# Patient Record
Sex: FEMALE | Race: WHITE | NOT HISPANIC OR LATINO | Employment: OTHER | URBAN - METROPOLITAN AREA
[De-identification: names, ages, dates, MRNs, and addresses within clinical notes are randomized per-mention and may not be internally consistent; named-entity substitution may affect disease eponyms.]

---

## 2017-04-01 ENCOUNTER — APPOINTMENT (OUTPATIENT)
Dept: GENERAL RADIOLOGY | Facility: HOSPITAL | Age: 62
End: 2017-04-01

## 2017-04-01 ENCOUNTER — HOSPITAL ENCOUNTER (EMERGENCY)
Facility: HOSPITAL | Age: 62
Discharge: HOME | End: 2017-04-01
Attending: EMERGENCY MEDICINE | Admitting: EMERGENCY MEDICINE

## 2017-04-01 VITALS
HEART RATE: 62 BPM | HEIGHT: 64 IN | DIASTOLIC BLOOD PRESSURE: 67 MMHG | OXYGEN SATURATION: 95 % | SYSTOLIC BLOOD PRESSURE: 127 MMHG | BODY MASS INDEX: 27.31 KG/M2 | WEIGHT: 160 LBS | RESPIRATION RATE: 16 BRPM | TEMPERATURE: 97.8 F

## 2017-04-01 DIAGNOSIS — R07.9 CHEST PAIN, UNSPECIFIED: Primary | ICD-10-CM

## 2017-04-01 LAB
ALBUMIN SERPL-MCNC: 4.2 G/DL (ref 3.2–4.8)
ALBUMIN/GLOB SERPL: 1.2 G/DL (ref 1.5–2.5)
ALP SERPL-CCNC: 129 U/L (ref 25–100)
ALT SERPL W P-5'-P-CCNC: 15 U/L (ref 7–40)
ANION GAP SERPL CALCULATED.3IONS-SCNC: 5 MMOL/L (ref 3–11)
AST SERPL-CCNC: 19 U/L (ref 0–33)
BASOPHILS # BLD AUTO: 0.02 10*3/MM3 (ref 0–0.2)
BASOPHILS NFR BLD AUTO: 0.3 % (ref 0–1)
BILIRUB SERPL-MCNC: 0.3 MG/DL (ref 0.3–1.2)
BNP SERPL-MCNC: 18 PG/ML (ref 0–100)
BUN BLD-MCNC: 19 MG/DL (ref 9–23)
BUN/CREAT SERPL: 21.1 (ref 7–25)
CALCIUM SPEC-SCNC: 9.8 MG/DL (ref 8.7–10.4)
CHLORIDE SERPL-SCNC: 97 MMOL/L (ref 99–109)
CO2 SERPL-SCNC: 33 MMOL/L (ref 20–31)
CREAT BLD-MCNC: 0.9 MG/DL (ref 0.6–1.3)
DEPRECATED RDW RBC AUTO: 45.6 FL (ref 37–54)
EOSINOPHIL # BLD AUTO: 0.11 10*3/MM3 (ref 0.1–0.3)
EOSINOPHIL NFR BLD AUTO: 1.5 % (ref 0–3)
ERYTHROCYTE [DISTWIDTH] IN BLOOD BY AUTOMATED COUNT: 13.7 % (ref 11.3–14.5)
GFR SERPL CREATININE-BSD FRML MDRD: 64 ML/MIN/1.73
GLOBULIN UR ELPH-MCNC: 3.6 GM/DL
GLUCOSE BLD-MCNC: 217 MG/DL (ref 70–100)
HCT VFR BLD AUTO: 38.9 % (ref 34.5–44)
HGB BLD-MCNC: 12 G/DL (ref 11.5–15.5)
HOLD SPECIMEN: NORMAL
HOLD SPECIMEN: NORMAL
IMM GRANULOCYTES # BLD: 0.01 10*3/MM3 (ref 0–0.03)
IMM GRANULOCYTES NFR BLD: 0.1 % (ref 0–0.6)
INR PPP: 2.3
LIPASE SERPL-CCNC: 53 U/L (ref 6–51)
LYMPHOCYTES # BLD AUTO: 1.83 10*3/MM3 (ref 0.6–4.8)
LYMPHOCYTES NFR BLD AUTO: 25.4 % (ref 24–44)
MCH RBC QN AUTO: 27.8 PG (ref 27–31)
MCHC RBC AUTO-ENTMCNC: 30.8 G/DL (ref 32–36)
MCV RBC AUTO: 90 FL (ref 80–99)
MONOCYTES # BLD AUTO: 0.36 10*3/MM3 (ref 0–1)
MONOCYTES NFR BLD AUTO: 5 % (ref 0–12)
NEUTROPHILS # BLD AUTO: 4.87 10*3/MM3 (ref 1.5–8.3)
NEUTROPHILS NFR BLD AUTO: 67.7 % (ref 41–71)
PLATELET # BLD AUTO: 321 10*3/MM3 (ref 150–450)
PMV BLD AUTO: 10.1 FL (ref 6–12)
POTASSIUM BLD-SCNC: 3.5 MMOL/L (ref 3.5–5.5)
PROT SERPL-MCNC: 7.8 G/DL (ref 5.7–8.2)
PROTHROMBIN TIME: 25.7 SECONDS (ref 9.6–11.5)
RBC # BLD AUTO: 4.32 10*6/MM3 (ref 3.89–5.14)
SODIUM BLD-SCNC: 135 MMOL/L (ref 132–146)
TROPONIN I SERPL-MCNC: 0 NG/ML (ref 0–0.07)
TROPONIN I SERPL-MCNC: 0 NG/ML (ref 0–0.07)
WBC NRBC COR # BLD: 7.2 10*3/MM3 (ref 3.5–10.8)
WHOLE BLOOD HOLD SPECIMEN: NORMAL
WHOLE BLOOD HOLD SPECIMEN: NORMAL

## 2017-04-01 PROCEDURE — 71010 HC CHEST PA OR AP: CPT

## 2017-04-01 PROCEDURE — 85610 PROTHROMBIN TIME: CPT | Performed by: EMERGENCY MEDICINE

## 2017-04-01 PROCEDURE — 83880 ASSAY OF NATRIURETIC PEPTIDE: CPT | Performed by: EMERGENCY MEDICINE

## 2017-04-01 PROCEDURE — 99243 OFF/OP CNSLTJ NEW/EST LOW 30: CPT | Performed by: INTERNAL MEDICINE

## 2017-04-01 PROCEDURE — 84484 ASSAY OF TROPONIN QUANT: CPT

## 2017-04-01 PROCEDURE — 80053 COMPREHEN METABOLIC PANEL: CPT | Performed by: EMERGENCY MEDICINE

## 2017-04-01 PROCEDURE — 85025 COMPLETE CBC W/AUTO DIFF WBC: CPT | Performed by: EMERGENCY MEDICINE

## 2017-04-01 PROCEDURE — 83690 ASSAY OF LIPASE: CPT | Performed by: EMERGENCY MEDICINE

## 2017-04-01 PROCEDURE — 99284 EMERGENCY DEPT VISIT MOD MDM: CPT

## 2017-04-01 PROCEDURE — 93005 ELECTROCARDIOGRAM TRACING: CPT | Performed by: EMERGENCY MEDICINE

## 2017-04-01 RX ORDER — SODIUM CHLORIDE 0.9 % (FLUSH) 0.9 %
10 SYRINGE (ML) INJECTION AS NEEDED
Status: DISCONTINUED | OUTPATIENT
Start: 2017-04-01 | End: 2017-04-01 | Stop reason: HOSPADM

## 2017-04-01 RX ORDER — WARFARIN SODIUM 4 MG/1
4 TABLET ORAL EVERY OTHER DAY
COMMUNITY
End: 2018-06-25

## 2017-04-01 NOTE — CONSULTS
[facility] at [facility]  ER cardiology consultation note      Chief Complaint/Reason for service:    · Chest pain         The patient is a 61-year-old female with a history of coronary artery disease and previous bypass surgery who underwent acceptable cardiac catheterization in August 2016.  He is followed by Dr. Tam Calabrese at the [facility].  This morning, she experienced substernal chest discomfort with radiation to her jaw while changing her sheets.  She took a baby aspirin and nitroglycerin and initially this did not resolve.  She called EMS and when EMS arrived they gave an additional nitroglycerin and her symptoms resolved.  She has been free of chest pain since arrival to the ER.  Her EKG is normal and her troponins are 0 and 0.  She desires to go home.    Past medical, surgical, social and family history reviewed in the patient's electronic medical record.    Review of Systems:   All systems were reviewed and negative except for:  Cardiovascular: positive for  chest pressure / pain, at rest and chest pressure / pain, on exertion  Neurological: positive for  Pain and weakness       Vital Sign Min/Max for last 24 hours  Temp  Min: 98.3 °F (36.8 °C)  Max: 98.3 °F (36.8 °C)   BP  Min: 90/54  Max: 110/65   Pulse  Min: 57  Max: 61   Resp  Min: 20  Max: 20   SpO2  Min: 94 %  Max: 98 %   No Data Recorded    No intake or output data in the 24 hours ending 04/01/17 1504        Physical Exam   Constitutional: She is oriented to person, place, and time. She appears well-developed and well-nourished.   HENT:   Head: Normocephalic and atraumatic.   Eyes: No scleral icterus.   Neck: No JVD present.   Cardiovascular: Normal rate and regular rhythm.    No murmur heard.  Pulmonary/Chest: Effort normal and breath sounds normal.   Neurological: She is alert and oriented to person, place, and time.   Skin: Skin is warm and dry.   Psychiatric: Her behavior is normal.       Results Review:   I  reviewed the patient's recent labs in the electronic medical record.      Troponin 0 and 0    Lab Results   Component Value Date    GLUCOSE 217 (H) 04/01/2017    BUN 19 04/01/2017    CREATININE 0.90 04/01/2017    EGFRIFNONA 64 04/01/2017    EGFRIFAFRI 77 08/22/2016    BCR 21.1 04/01/2017    K 3.5 04/01/2017    CO2 33.0 (H) 04/01/2017    CALCIUM 9.8 04/01/2017    ALBUMIN 4.20 04/01/2017    LABIL2 1.2 (L) 04/01/2017    AST 19 04/01/2017    ALT 15 04/01/2017     Lab Results   Component Value Date    WBC 7.20 04/01/2017    HGB 12.0 04/01/2017    HCT 38.9 04/01/2017    MCV 90.0 04/01/2017     04/01/2017         EKG:  Sinus rhythm with 58 bpm.  No ischemic ST or T-wave changes.                Hospital Problem List     * (Principal)Coronary artery disease involving native coronary artery of native heart with angina pectoris    Overview Addendum 4/1/2017  3:08 PM by Bernard Palumbo MD     · Coronary artery bypass surgery by Dr. Darion Corbin (12/28/2011): Sequential LIMA to diagonal/LAD, SVG to left posterior lateral branch, SVG to nondominant RCA.  · Stress test reportedly normal per patient, February 2016  · [facility] admission for biomarker negative for recurrent chest pain  · Cardiac catheterization (8/22/16): Severe 2 vessel coronary disease involving the mid LAD and nondominant right coronary artery.  3/3 grafts patent (LIMA to diagonal/LAD, SVG to left posterior branch, SVG to nondominant RCA).  SVG to RCA is atretic due to small distal vessel.  Normal LVEF.  Medical therapy recommended.  · [facility] ER presentation with chest pain rule out, 4/1/17             The patient is chest pain-free and has negative cardiac biomarkers and EKG.  She underwent cardiac catheterization approximately 8 months ago which was acceptable.           · Discharge home from ER  · Follow-up with Tam Calabrese  · If chest pain symptoms recur, advised patient to return to ER    Bernard Palumbo,  MD  4/1/2017

## 2017-04-01 NOTE — ED PROVIDER NOTES
Subjective   HPI Comments: 61 y.o. Female presents to ED with c/o CP. She reports that while doing the bed this morning she developed chest pain and started feeling clammy. She states that the pain is in the center of her chest and goes a little bit to the left. She claims she took 2 nitroglycerin and 2 baby aspirin at home with no relief and EMS gave her 4-5 more baby aspirin en route with moderate relief. She denies any SoA, nausea or sweating. She has hx of MI, PE, and blood clots but claims it doesn't feel like her previous blood clots. She is currently taking Cumidine. No other acute complaints at this time.    Patient is a 61 y.o. female presenting with chest pain.   History provided by:  Patient  Chest Pain   Pain location:  L chest  Pain radiates to:  Does not radiate  Pain severity:  Moderate  Onset quality:  Sudden  Timing:  Constant  Progression:  Improving  Chronicity:  New  Relieved by:  Aspirin  Worsened by:  Nothing  Ineffective treatments:  Nitroglycerin  Associated symptoms: no abdominal pain, no back pain, no cough, no diaphoresis, no fever, no lower extremity edema, no nausea, no shortness of breath and no vomiting        Review of Systems   Constitutional: Negative for diaphoresis and fever.   HENT: Negative for nosebleeds, rhinorrhea and sneezing.    Respiratory: Negative for cough and shortness of breath.    Cardiovascular: Positive for chest pain.   Gastrointestinal: Negative for abdominal pain, nausea and vomiting.   Musculoskeletal: Negative for back pain.   All other systems reviewed and are negative.      Past Medical History:   Diagnosis Date   • ASHD (arteriosclerotic heart disease)    • Cervical disc disorder of mid-cervical region    • Chronic pain    • Depression    • Diabetes mellitus    • Hyperlipidemia    • Myocardial infarction    • Pulmonary embolism        Allergies   Allergen Reactions   • Penicillins Anaphylaxis   • Abilify [Aripiprazole]      Unknown reaction   • Lipitor  [Atorvastatin]      Causes hair to fall out   • Narcan [Naloxone Hcl]      Caused a heart attack   • Neosporin [Neomycin-Bacitracin Zn-Polymyx]      Blisters   • Flexeril [Cyclobenzaprine] Rash   • Vioxx [Rofecoxib] Rash       Past Surgical History:   Procedure Laterality Date   • CARDIAC CATHETERIZATION N/A 8/22/2016    Procedure: Left Heart Cath with +/- CBI;  Surgeon: Bernard Palumbo MD;  Location: [facility] CATH INVASIVE LOCATION;  Service:    • CORONARY ARTERY BYPASS GRAFT     • CORONARY ARTERY BYPASS GRAFT     • PAIN PUMP INSERTION/REVISION     • PAIN PUMP INSERTION/REVISION         History reviewed. No pertinent family history.    Social History     Social History   • Marital status:      Spouse name: N/A   • Number of children: N/A   • Years of education: N/A     Social History Main Topics   • Smoking status: Never Smoker   • Smokeless tobacco: None   • Alcohol use No   • Drug use: No   • Sexual activity: Defer     Other Topics Concern   • None     Social History Narrative   • None         Objective   Physical Exam   Constitutional: She is oriented to person, place, and time. She appears well-developed and well-nourished. No distress.   HENT:   Head: Normocephalic and atraumatic.   Mouth/Throat: Mucous membranes are normal.   Eyes: Conjunctivae are normal. Pupils are equal, round, and reactive to light.   Neck: Normal range of motion. Neck supple.   Cardiovascular: Normal rate, regular rhythm and normal heart sounds.  Exam reveals no gallop and no friction rub.    No murmur heard.  Pulmonary/Chest: Effort normal. No respiratory distress. She has no wheezes. She has no rales. She exhibits no tenderness.   Abdominal: Soft. Bowel sounds are normal. She exhibits no mass. There is no tenderness. There is no rebound and no guarding.   Musculoskeletal: Normal range of motion. She exhibits no edema, tenderness or deformity.   Lymphadenopathy:     She has no cervical adenopathy.   Neurological: She is  alert and oriented to person, place, and time.   Skin: Skin is warm and dry.   Psychiatric: She has a normal mood and affect. Her behavior is normal.   Nursing note and vitals reviewed.      Procedures         ED Course  ED Course           HEART Score  History: Highly suspicious (+2)  ECG: Normal (+0)  Age: 45 through 65 (+1)  Risk Factors: 3 or more risk factors OR history of atherosclerotic disease (+2)  Troponin: Normal limit or lower (+0)  Total: 5     EKG SB, some artifact.  CXR negative.  2 negative EKGs and 2 negative troponins.  Pt pain free on serial rechecks.  Reviewed records, heart cath about 6 months ago showed widely patent grafts, no actionable disease.  I consulted Dr Palumbo who evaluated the patient in the ED and recommended dc home for outpatient followup.        MDM  Number of Diagnoses or Management Options  Chest pain, unspecified:      Amount and/or Complexity of Data Reviewed  Clinical lab tests: reviewed and ordered  Tests in the radiology section of CPT®: ordered and reviewed  Decide to obtain previous medical records or to obtain history from someone other than the patient: yes  Discuss the patient with other providers: yes  Independent visualization of images, tracings, or specimens: yes        Final diagnoses:   Chest pain, unspecified       Documentation assistance provided by sarmad Crandall.  Information recorded by the sarmad was done at my direction and has been verified and validated by me.     Dana Crandall  04/01/17 1110       Dana Crandall  04/01/17 1157       Dana Crandall  04/01/17 1159       Dana Crandall  04/01/17 1204       Saad Dee MD  04/01/17 7508

## 2017-07-28 ENCOUNTER — APPOINTMENT (OUTPATIENT)
Dept: CT IMAGING | Facility: HOSPITAL | Age: 62
End: 2017-07-28

## 2017-07-28 ENCOUNTER — HOSPITAL ENCOUNTER (EMERGENCY)
Facility: HOSPITAL | Age: 62
Discharge: HOME | End: 2017-07-28
Attending: EMERGENCY MEDICINE | Admitting: EMERGENCY MEDICINE

## 2017-07-28 ENCOUNTER — APPOINTMENT (OUTPATIENT)
Dept: GENERAL RADIOLOGY | Facility: HOSPITAL | Age: 62
End: 2017-07-28

## 2017-07-28 VITALS
HEIGHT: 60 IN | TEMPERATURE: 98.7 F | BODY MASS INDEX: 31.41 KG/M2 | WEIGHT: 160 LBS | SYSTOLIC BLOOD PRESSURE: 135 MMHG | HEART RATE: 72 BPM | OXYGEN SATURATION: 99 % | RESPIRATION RATE: 16 BRPM | DIASTOLIC BLOOD PRESSURE: 75 MMHG

## 2017-07-28 DIAGNOSIS — M54.50 ACUTE EXACERBATION OF CHRONIC LOW BACK PAIN: Primary | ICD-10-CM

## 2017-07-28 DIAGNOSIS — G89.29 ACUTE EXACERBATION OF CHRONIC LOW BACK PAIN: Primary | ICD-10-CM

## 2017-07-28 LAB
ALBUMIN SERPL-MCNC: 3.9 G/DL (ref 3.2–4.8)
ALBUMIN/GLOB SERPL: 1.1 G/DL (ref 1.5–2.5)
ALP SERPL-CCNC: 101 U/L (ref 25–100)
ALT SERPL W P-5'-P-CCNC: 17 U/L (ref 7–40)
AMPHET+METHAMPHET UR QL: NEGATIVE
AMPHETAMINES UR QL: POSITIVE
ANION GAP SERPL CALCULATED.3IONS-SCNC: 8 MMOL/L (ref 3–11)
AST SERPL-CCNC: 18 U/L (ref 0–33)
BACTERIA UR QL AUTO: ABNORMAL /HPF
BARBITURATES UR QL SCN: NEGATIVE
BASOPHILS # BLD AUTO: 0.01 10*3/MM3 (ref 0–0.2)
BASOPHILS NFR BLD AUTO: 0.1 % (ref 0–1)
BENZODIAZ UR QL SCN: NEGATIVE
BILIRUB SERPL-MCNC: 0.5 MG/DL (ref 0.3–1.2)
BILIRUB UR QL STRIP: NEGATIVE
BUN BLD-MCNC: 16 MG/DL (ref 9–23)
BUN/CREAT SERPL: 17.8 (ref 7–25)
BUPRENORPHINE SERPL-MCNC: NEGATIVE NG/ML
CALCIUM SPEC-SCNC: 9.2 MG/DL (ref 8.7–10.4)
CANNABINOIDS SERPL QL: NEGATIVE
CHLORIDE SERPL-SCNC: 105 MMOL/L (ref 99–109)
CLARITY UR: CLEAR
CO2 SERPL-SCNC: 26 MMOL/L (ref 20–31)
COCAINE UR QL: NEGATIVE
COLOR UR: YELLOW
CREAT BLD-MCNC: 0.9 MG/DL (ref 0.6–1.3)
DEPRECATED RDW RBC AUTO: 42 FL (ref 37–54)
EOSINOPHIL # BLD AUTO: 0 10*3/MM3 (ref 0–0.3)
EOSINOPHIL NFR BLD AUTO: 0 % (ref 0–3)
ERYTHROCYTE [DISTWIDTH] IN BLOOD BY AUTOMATED COUNT: 13.9 % (ref 11.3–14.5)
GFR SERPL CREATININE-BSD FRML MDRD: 63 ML/MIN/1.73
GLOBULIN UR ELPH-MCNC: 3.7 GM/DL
GLUCOSE BLD-MCNC: 169 MG/DL (ref 70–100)
GLUCOSE UR STRIP-MCNC: NEGATIVE MG/DL
HCT VFR BLD AUTO: 35.3 % (ref 34.5–44)
HGB BLD-MCNC: 11.3 G/DL (ref 11.5–15.5)
HGB UR QL STRIP.AUTO: NEGATIVE
HYALINE CASTS UR QL AUTO: ABNORMAL /LPF
IMM GRANULOCYTES # BLD: 0.03 10*3/MM3 (ref 0–0.03)
IMM GRANULOCYTES NFR BLD: 0.3 % (ref 0–0.6)
INR PPP: 1.07
KETONES UR QL STRIP: NEGATIVE
LEUKOCYTE ESTERASE UR QL STRIP.AUTO: ABNORMAL
LYMPHOCYTES # BLD AUTO: 1.19 10*3/MM3 (ref 0.6–4.8)
LYMPHOCYTES NFR BLD AUTO: 11.6 % (ref 24–44)
MCH RBC QN AUTO: 26.8 PG (ref 27–31)
MCHC RBC AUTO-ENTMCNC: 32 G/DL (ref 32–36)
MCV RBC AUTO: 83.8 FL (ref 80–99)
METHADONE UR QL SCN: NEGATIVE
MONOCYTES # BLD AUTO: 0.45 10*3/MM3 (ref 0–1)
MONOCYTES NFR BLD AUTO: 4.4 % (ref 0–12)
NEUTROPHILS # BLD AUTO: 8.55 10*3/MM3 (ref 1.5–8.3)
NEUTROPHILS NFR BLD AUTO: 83.6 % (ref 41–71)
NITRITE UR QL STRIP: NEGATIVE
OPIATES UR QL: POSITIVE
OXYCODONE UR QL SCN: POSITIVE
PCP UR QL SCN: NEGATIVE
PH UR STRIP.AUTO: 7.5 [PH] (ref 5–8)
PLATELET # BLD AUTO: 279 10*3/MM3 (ref 150–450)
PMV BLD AUTO: 9.9 FL (ref 6–12)
POTASSIUM BLD-SCNC: 3.7 MMOL/L (ref 3.5–5.5)
PROPOXYPH UR QL: NEGATIVE
PROT SERPL-MCNC: 7.6 G/DL (ref 5.7–8.2)
PROT UR QL STRIP: ABNORMAL
PROTHROMBIN TIME: 11.7 SECONDS (ref 9.6–11.5)
RBC # BLD AUTO: 4.21 10*6/MM3 (ref 3.89–5.14)
RBC # UR: ABNORMAL /HPF
REF LAB TEST METHOD: ABNORMAL
SODIUM BLD-SCNC: 139 MMOL/L (ref 132–146)
SP GR UR STRIP: 1.02 (ref 1–1.03)
SQUAMOUS #/AREA URNS HPF: ABNORMAL /HPF
TRICYCLICS UR QL SCN: NEGATIVE
UROBILINOGEN UR QL STRIP: ABNORMAL
WBC NRBC COR # BLD: 10.23 10*3/MM3 (ref 3.5–10.8)
WBC UR QL AUTO: ABNORMAL /HPF

## 2017-07-28 PROCEDURE — 25010000002 ONDANSETRON PER 1 MG: Performed by: PHYSICIAN ASSISTANT

## 2017-07-28 PROCEDURE — 70450 CT HEAD/BRAIN W/O DYE: CPT

## 2017-07-28 PROCEDURE — 71010 HC CHEST PA OR AP: CPT

## 2017-07-28 PROCEDURE — 96374 THER/PROPH/DIAG INJ IV PUSH: CPT

## 2017-07-28 PROCEDURE — 72131 CT LUMBAR SPINE W/O DYE: CPT

## 2017-07-28 PROCEDURE — 85025 COMPLETE CBC W/AUTO DIFF WBC: CPT | Performed by: PHYSICIAN ASSISTANT

## 2017-07-28 PROCEDURE — 96361 HYDRATE IV INFUSION ADD-ON: CPT

## 2017-07-28 PROCEDURE — 81001 URINALYSIS AUTO W/SCOPE: CPT | Performed by: EMERGENCY MEDICINE

## 2017-07-28 PROCEDURE — 99284 EMERGENCY DEPT VISIT MOD MDM: CPT

## 2017-07-28 PROCEDURE — 80053 COMPREHEN METABOLIC PANEL: CPT | Performed by: PHYSICIAN ASSISTANT

## 2017-07-28 PROCEDURE — 25010000002 HYDROMORPHONE PER 4 MG: Performed by: EMERGENCY MEDICINE

## 2017-07-28 PROCEDURE — 96375 TX/PRO/DX INJ NEW DRUG ADDON: CPT

## 2017-07-28 PROCEDURE — 80306 DRUG TEST PRSMV INSTRMNT: CPT | Performed by: PHYSICIAN ASSISTANT

## 2017-07-28 PROCEDURE — 85610 PROTHROMBIN TIME: CPT | Performed by: PHYSICIAN ASSISTANT

## 2017-07-28 RX ORDER — FLUTICASONE PROPIONATE 50 MCG
2 SPRAY, SUSPENSION (ML) NASAL DAILY
COMMUNITY

## 2017-07-28 RX ORDER — ASPIRIN 81 MG/1
81 TABLET, CHEWABLE ORAL DAILY
COMMUNITY

## 2017-07-28 RX ORDER — BUSPIRONE HYDROCHLORIDE 5 MG/1
10 TABLET ORAL DAILY
COMMUNITY

## 2017-07-28 RX ORDER — ONDANSETRON 4 MG/1
4 TABLET, FILM COATED ORAL EVERY 8 HOURS PRN
Qty: 20 TABLET | Refills: 0 | Status: SHIPPED | OUTPATIENT
Start: 2017-07-28 | End: 2018-06-25

## 2017-07-28 RX ORDER — ONDANSETRON 2 MG/ML
4 INJECTION INTRAMUSCULAR; INTRAVENOUS ONCE
Status: COMPLETED | OUTPATIENT
Start: 2017-07-28 | End: 2017-07-28

## 2017-07-28 RX ORDER — CIPROFLOXACIN 500 MG/1
500 TABLET, FILM COATED ORAL 2 TIMES DAILY
COMMUNITY
End: 2018-06-25

## 2017-07-28 RX ORDER — LISINOPRIL 2.5 MG/1
20 TABLET ORAL DAILY
COMMUNITY

## 2017-07-28 RX ORDER — DOCUSATE SODIUM 100 MG/1
100 CAPSULE, LIQUID FILLED ORAL DAILY
COMMUNITY

## 2017-07-28 RX ORDER — LEVETIRACETAM 750 MG/1
750 TABLET ORAL 2 TIMES DAILY
COMMUNITY

## 2017-07-28 RX ORDER — HYDROMORPHONE HYDROCHLORIDE 1 MG/ML
0.5 INJECTION, SOLUTION INTRAMUSCULAR; INTRAVENOUS; SUBCUTANEOUS ONCE
Status: COMPLETED | OUTPATIENT
Start: 2017-07-28 | End: 2017-07-28

## 2017-07-28 RX ADMIN — HYDROMORPHONE HYDROCHLORIDE 0.5 MG: 1 INJECTION, SOLUTION INTRAMUSCULAR; INTRAVENOUS; SUBCUTANEOUS at 18:47

## 2017-07-28 RX ADMIN — ONDANSETRON 4 MG: 2 INJECTION INTRAMUSCULAR; INTRAVENOUS at 18:47

## 2017-07-28 RX ADMIN — SODIUM CHLORIDE 500 ML: 9 INJECTION, SOLUTION INTRAVENOUS at 20:34

## 2017-07-28 RX ADMIN — SODIUM CHLORIDE 500 ML: 9 INJECTION, SOLUTION INTRAVENOUS at 18:47

## 2018-06-05 ENCOUNTER — TRANSCRIBE ORDERS (OUTPATIENT)
Dept: ADMINISTRATIVE | Facility: HOSPITAL | Age: 63
End: 2018-06-05

## 2018-06-05 DIAGNOSIS — Z12.31 VISIT FOR SCREENING MAMMOGRAM: Primary | ICD-10-CM

## 2018-06-11 ENCOUNTER — HOSPITAL ENCOUNTER (EMERGENCY)
Facility: HOSPITAL | Age: 63
Discharge: HOME | End: 2018-06-11
Attending: EMERGENCY MEDICINE | Admitting: EMERGENCY MEDICINE

## 2018-06-11 ENCOUNTER — APPOINTMENT (OUTPATIENT)
Dept: GENERAL RADIOLOGY | Facility: HOSPITAL | Age: 63
End: 2018-06-11

## 2018-06-11 VITALS
BODY MASS INDEX: 28.86 KG/M2 | HEART RATE: 90 BPM | RESPIRATION RATE: 16 BRPM | SYSTOLIC BLOOD PRESSURE: 118 MMHG | DIASTOLIC BLOOD PRESSURE: 69 MMHG | WEIGHT: 147 LBS | HEIGHT: 60 IN | TEMPERATURE: 98.1 F | OXYGEN SATURATION: 91 %

## 2018-06-11 DIAGNOSIS — R06.00 DYSPNEA, UNSPECIFIED TYPE: ICD-10-CM

## 2018-06-11 DIAGNOSIS — M62.838 NECK MUSCLE SPASM: ICD-10-CM

## 2018-06-11 DIAGNOSIS — J44.1 COPD WITH ACUTE EXACERBATION (HCC): Primary | ICD-10-CM

## 2018-06-11 LAB
ALBUMIN SERPL-MCNC: 5.03 G/DL (ref 3.2–4.8)
ALBUMIN/GLOB SERPL: 1.2 G/DL (ref 1.5–2.5)
ALP SERPL-CCNC: 134 U/L (ref 25–100)
ALT SERPL W P-5'-P-CCNC: 16 U/L (ref 7–40)
ANION GAP SERPL CALCULATED.3IONS-SCNC: 15 MMOL/L (ref 3–11)
AST SERPL-CCNC: 18 U/L (ref 0–33)
BASOPHILS # BLD AUTO: 0.02 10*3/MM3 (ref 0–0.2)
BASOPHILS NFR BLD AUTO: 0.2 % (ref 0–1)
BILIRUB SERPL-MCNC: 0.3 MG/DL (ref 0.3–1.2)
BNP SERPL-MCNC: 71 PG/ML (ref 0–100)
BUN BLD-MCNC: 20 MG/DL (ref 9–23)
BUN/CREAT SERPL: 14.5 (ref 7–25)
CALCIUM SPEC-SCNC: 10.8 MG/DL (ref 8.7–10.4)
CHLORIDE SERPL-SCNC: 100 MMOL/L (ref 99–109)
CO2 SERPL-SCNC: 27 MMOL/L (ref 20–31)
CREAT BLD-MCNC: 1.38 MG/DL (ref 0.6–1.3)
D DIMER PPP FEU-MCNC: 1.25 MG/L (FEU) (ref 0–0.5)
DEPRECATED RDW RBC AUTO: 41.9 FL (ref 37–54)
EOSINOPHIL # BLD AUTO: 0.18 10*3/MM3 (ref 0–0.3)
EOSINOPHIL NFR BLD AUTO: 1.9 % (ref 0–3)
ERYTHROCYTE [DISTWIDTH] IN BLOOD BY AUTOMATED COUNT: 13.3 % (ref 11.3–14.5)
GFR SERPL CREATININE-BSD FRML MDRD: 39 ML/MIN/1.73
GLOBULIN UR ELPH-MCNC: 4.1 GM/DL
GLUCOSE BLD-MCNC: 126 MG/DL (ref 70–100)
HCT VFR BLD AUTO: 41 % (ref 34.5–44)
HGB BLD-MCNC: 13.3 G/DL (ref 11.5–15.5)
HOLD SPECIMEN: NORMAL
HOLD SPECIMEN: NORMAL
IMM GRANULOCYTES # BLD: 0.02 10*3/MM3 (ref 0–0.03)
IMM GRANULOCYTES NFR BLD: 0.2 % (ref 0–0.6)
LYMPHOCYTES # BLD AUTO: 2.92 10*3/MM3 (ref 0.6–4.8)
LYMPHOCYTES NFR BLD AUTO: 31.4 % (ref 24–44)
MCH RBC QN AUTO: 27.9 PG (ref 27–31)
MCHC RBC AUTO-ENTMCNC: 32.4 G/DL (ref 32–36)
MCV RBC AUTO: 86.1 FL (ref 80–99)
MONOCYTES # BLD AUTO: 0.4 10*3/MM3 (ref 0–1)
MONOCYTES NFR BLD AUTO: 4.3 % (ref 0–12)
NEUTROPHILS # BLD AUTO: 5.76 10*3/MM3 (ref 1.5–8.3)
NEUTROPHILS NFR BLD AUTO: 62 % (ref 41–71)
PLATELET # BLD AUTO: 319 10*3/MM3 (ref 150–450)
PMV BLD AUTO: 10.5 FL (ref 6–12)
POTASSIUM BLD-SCNC: 3.4 MMOL/L (ref 3.5–5.5)
PROT SERPL-MCNC: 9.1 G/DL (ref 5.7–8.2)
RBC # BLD AUTO: 4.76 10*6/MM3 (ref 3.89–5.14)
SODIUM BLD-SCNC: 142 MMOL/L (ref 132–146)
TROPONIN I SERPL-MCNC: 0 NG/ML (ref 0–0.07)
WBC NRBC COR # BLD: 9.3 10*3/MM3 (ref 3.5–10.8)
WHOLE BLOOD HOLD SPECIMEN: NORMAL
WHOLE BLOOD HOLD SPECIMEN: NORMAL

## 2018-06-11 PROCEDURE — 94644 CONT INHLJ TX 1ST HOUR: CPT

## 2018-06-11 PROCEDURE — 71046 X-RAY EXAM CHEST 2 VIEWS: CPT

## 2018-06-11 PROCEDURE — 25010000002 HYDROMORPHONE PER 4 MG: Performed by: EMERGENCY MEDICINE

## 2018-06-11 PROCEDURE — 83880 ASSAY OF NATRIURETIC PEPTIDE: CPT

## 2018-06-11 PROCEDURE — 96374 THER/PROPH/DIAG INJ IV PUSH: CPT

## 2018-06-11 PROCEDURE — 93005 ELECTROCARDIOGRAM TRACING: CPT | Performed by: EMERGENCY MEDICINE

## 2018-06-11 PROCEDURE — 84484 ASSAY OF TROPONIN QUANT: CPT

## 2018-06-11 PROCEDURE — 80053 COMPREHEN METABOLIC PANEL: CPT

## 2018-06-11 PROCEDURE — 85379 FIBRIN DEGRADATION QUANT: CPT | Performed by: EMERGENCY MEDICINE

## 2018-06-11 PROCEDURE — 99284 EMERGENCY DEPT VISIT MOD MDM: CPT

## 2018-06-11 PROCEDURE — 85025 COMPLETE CBC W/AUTO DIFF WBC: CPT

## 2018-06-11 PROCEDURE — 63710000001 PREDNISONE PER 1 MG: Performed by: EMERGENCY MEDICINE

## 2018-06-11 PROCEDURE — 25010000002 MORPHINE PER 10 MG: Performed by: EMERGENCY MEDICINE

## 2018-06-11 PROCEDURE — 96375 TX/PRO/DX INJ NEW DRUG ADDON: CPT

## 2018-06-11 PROCEDURE — 94760 N-INVAS EAR/PLS OXIMETRY 1: CPT

## 2018-06-11 PROCEDURE — 94799 UNLISTED PULMONARY SVC/PX: CPT

## 2018-06-11 RX ORDER — PREDNISONE 20 MG/1
20 TABLET ORAL 3 TIMES DAILY
Qty: 15 TABLET | Refills: 0 | Status: SHIPPED | OUTPATIENT
Start: 2018-06-11 | End: 2018-06-25

## 2018-06-11 RX ORDER — MORPHINE SULFATE 4 MG/ML
4 INJECTION, SOLUTION INTRAMUSCULAR; INTRAVENOUS ONCE
Status: COMPLETED | OUTPATIENT
Start: 2018-06-11 | End: 2018-06-11

## 2018-06-11 RX ORDER — ALBUTEROL SULFATE 90 UG/1
2 AEROSOL, METERED RESPIRATORY (INHALATION) EVERY 4 HOURS PRN
Qty: 1 INHALER | Refills: 0 | Status: SHIPPED | OUTPATIENT
Start: 2018-06-11 | End: 2018-06-25

## 2018-06-11 RX ORDER — ALBUTEROL SULFATE 90 UG/1
2 AEROSOL, METERED RESPIRATORY (INHALATION) EVERY 4 HOURS PRN
COMMUNITY

## 2018-06-11 RX ORDER — ALBUTEROL SULFATE 2.5 MG/3ML
10 SOLUTION RESPIRATORY (INHALATION) CONTINUOUS
Status: DISPENSED | OUTPATIENT
Start: 2018-06-11 | End: 2018-06-11

## 2018-06-11 RX ORDER — PREDNISONE 20 MG/1
60 TABLET ORAL ONCE
Status: COMPLETED | OUTPATIENT
Start: 2018-06-11 | End: 2018-06-11

## 2018-06-11 RX ORDER — BENZONATATE 100 MG/1
100 CAPSULE ORAL 3 TIMES DAILY PRN
COMMUNITY
End: 2018-06-25

## 2018-06-11 RX ORDER — IPRATROPIUM BROMIDE AND ALBUTEROL SULFATE 2.5; .5 MG/3ML; MG/3ML
3 SOLUTION RESPIRATORY (INHALATION) ONCE
Status: COMPLETED | OUTPATIENT
Start: 2018-06-11 | End: 2018-06-11

## 2018-06-11 RX ORDER — DOCUSATE SODIUM 100 MG/1
100 CAPSULE, LIQUID FILLED ORAL DAILY
COMMUNITY

## 2018-06-11 RX ORDER — SODIUM CHLORIDE 0.9 % (FLUSH) 0.9 %
10 SYRINGE (ML) INJECTION AS NEEDED
Status: DISCONTINUED | OUTPATIENT
Start: 2018-06-11 | End: 2018-06-11 | Stop reason: HOSPADM

## 2018-06-11 RX ORDER — HYDROMORPHONE HYDROCHLORIDE 1 MG/ML
0.5 INJECTION, SOLUTION INTRAMUSCULAR; INTRAVENOUS; SUBCUTANEOUS ONCE
Status: COMPLETED | OUTPATIENT
Start: 2018-06-11 | End: 2018-06-11

## 2018-06-11 RX ADMIN — ALBUTEROL SULFATE 10 MG: 2.5 SOLUTION RESPIRATORY (INHALATION) at 02:00

## 2018-06-11 RX ADMIN — MORPHINE SULFATE 4 MG: 4 INJECTION, SOLUTION INTRAMUSCULAR; INTRAVENOUS at 05:47

## 2018-06-11 RX ADMIN — HYDROMORPHONE HYDROCHLORIDE 0.5 MG: 10 INJECTION INTRAMUSCULAR; INTRAVENOUS; SUBCUTANEOUS at 06:35

## 2018-06-11 RX ADMIN — PREDNISONE 60 MG: 20 TABLET ORAL at 02:28

## 2018-06-11 RX ADMIN — IPRATROPIUM BROMIDE AND ALBUTEROL SULFATE 3 ML: 2.5; .5 SOLUTION RESPIRATORY (INHALATION) at 02:00

## 2018-06-11 NOTE — ED PROVIDER NOTES
Subjective   Ms. Sheree Samuel is a 62 year old female who presents to the ED with c/o shortness of breath. She reports she was diagnosed with bronchitis 1 week ago and was started on antibiotics and cough medications at that time but her symptoms have not improved. She also complains of a cough producing a yellow sputum. She reports history of multiple pulmonary emboli and she is on Eliquis. She denies smoking but has had secondhand smoke exposure. She reports history of CABG. There are no other acute symptoms at this time.        Shortness of Breath   Severity:  Moderate  Onset quality:  Gradual  Duration:  1 week  Timing:  Constant  Progression:  Worsening  Chronicity:  New  Relieved by:  Nothing  Worsened by:  Nothing  Ineffective treatments:  None tried (antibiotics and cough medication)  Associated symptoms: cough    Cough:     Cough characteristics:  Productive    Sputum characteristics:  Yellow    Progression:  Worsening  Risk factors: hx of PE/DVT    Risk factors: no tobacco use (secondhand smoke exposure)        Review of Systems   Respiratory: Positive for cough and shortness of breath.    All other systems reviewed and are negative.      Past Medical History:   Diagnosis Date   • ASHD (arteriosclerotic heart disease)    • Cervical disc disorder of mid-cervical region    • Chronic pain    • Depression    • Diabetes mellitus    • Hyperlipidemia    • MRSA carrier    • Myocardial infarction    • Pulmonary embolism        Allergies   Allergen Reactions   • Penicillins Anaphylaxis   • Abilify [Aripiprazole]      Unknown reaction   • Lipitor [Atorvastatin]      Causes hair to fall out   • Narcan [Naloxone Hcl]      Caused a heart attack   • Neosporin [Neomycin-Bacitracin Zn-Polymyx]      Blisters   • Adhesive Tape Rash   • Flexeril [Cyclobenzaprine] Rash   • Vioxx [Rofecoxib] Rash       Past Surgical History:   Procedure Laterality Date   • CARDIAC CATHETERIZATION N/A 8/22/2016    Procedure: Left Heart Cath with  +/- CBI;  Surgeon: Bernard Palumbo MD;  Location: [facility] CATH INVASIVE LOCATION;  Service:    • CORONARY ARTERY BYPASS GRAFT     • CORONARY ARTERY BYPASS GRAFT     • PAIN PUMP INSERTION/REVISION     • PAIN PUMP INSERTION/REVISION         History reviewed. No pertinent family history.    Social History     Social History   • Marital status:      Social History Main Topics   • Smoking status: Never Smoker   • Alcohol use No   • Drug use: No   • Sexual activity: Defer     Other Topics Concern   • Not on file         Objective   Physical Exam   Constitutional: She is oriented to person, place, and time. She appears well-developed and well-nourished. No distress.   HENT:   Head: Normocephalic and atraumatic.   Nose: Nose normal.   Eyes: Conjunctivae are normal. No scleral icterus.   Neck: Normal range of motion.   Cardiovascular: Normal rate, regular rhythm and normal heart sounds.    Pulmonary/Chest: Effort normal. No respiratory distress. She has wheezes (expiratory) in the right middle field, the right lower field, the left middle field and the left lower field.   Abdominal: Soft. There is no tenderness.   Musculoskeletal: Normal range of motion. She exhibits edema (1+ pitting edema of the lower legs which is equal bilaterally).   Calf sizes equal.   Neurological: She is alert and oriented to person, place, and time.   Skin: Skin is warm and dry.   Psychiatric: She has a normal mood and affect. Her behavior is normal.   Nursing note and vitals reviewed.      Procedures         ED Course                     MDM  Number of Diagnoses or Management Options  COPD with acute exacerbation: new and requires workup  Dyspnea, unspecified type: new and requires workup  Neck muscle spasm: new and requires workup  Diagnosis management comments: Patient's presentation is consistent with COPD exacerbation.  She has wheezing on exam.    On subsequent repeat evaluation the patient recently steadily improved, and her  shortness of breath likewise to improve.  He will without oxygen and her oxygen saturations were 97% or higher.  She reports feeling significantly improved.    Please note a d-dimer was performed was elevated to 1.25.  There was multiple attempts to establish an IV in order to obtain the CT angiogram.    The patient had an exacerbation of her chronic underlying neck spasms while she was here.  Pain medication was provided for the neck pain.     She reports that she does not desire to proceed with any further treatment    I discussed the risk and benefits of CT angio of the chest versus not doing the CT angiogram of the chest, she once again does not desire to pursue that treatment at this time.  She reports feeling better, and desires to go home.    Patient will be discharged with prednisone, albuterol inhaler, and advised to follow-up with primary care physician for repeat evaluation.    She will be advised to return to the ER immediately with any further concerns or worsening of symptoms..       Amount and/or Complexity of Data Reviewed  Clinical lab tests: ordered and reviewed  Tests in the radiology section of CPT®: ordered and reviewed  Decide to obtain previous medical records or to obtain history from someone other than the patient: yes  Obtain history from someone other than the patient: yes  Review and summarize past medical records: yes  Independent visualization of images, tracings, or specimens: yes    Patient Progress  Patient progress: stable      Final diagnoses:   COPD with acute exacerbation   Dyspnea, unspecified type   Neck muscle spasm       Documentation assistance provided by sarmad Adams.  Information recorded by the sarmad was done at my direction and has been verified and validated by me.     Yusuf Adams  06/11/18 0224       Yusuf Adams  06/11/18 0352       Lester Moon MD  06/11/18 0664

## 2018-06-11 NOTE — DISCHARGE INSTRUCTIONS
Patient advised to apply heat to neck as needed for muscle spasm.     Take Prednisone as prescribed.     Use inhaler every 4-6 hours.     Return to ER with any further concerns.

## 2018-06-25 ENCOUNTER — OFFICE VISIT (OUTPATIENT)
Dept: NEUROLOGY | Facility: CLINIC | Age: 63
End: 2018-06-25

## 2018-06-25 VITALS
RESPIRATION RATE: 18 BRPM | SYSTOLIC BLOOD PRESSURE: 122 MMHG | DIASTOLIC BLOOD PRESSURE: 60 MMHG | WEIGHT: 151 LBS | OXYGEN SATURATION: 97 % | HEART RATE: 64 BPM | HEIGHT: 60 IN | BODY MASS INDEX: 29.64 KG/M2

## 2018-06-25 DIAGNOSIS — G40.109 LOCALIZATION-RELATED SYMPTOMATIC EPILEPSY AND EPILEPTIC SYNDROMES WITH SIMPLE PARTIAL SEIZURES, NOT INTRACTABLE, WITHOUT STATUS EPILEPTICUS (HCC): Primary | ICD-10-CM

## 2018-06-25 PROCEDURE — 99244 OFF/OP CNSLTJ NEW/EST MOD 40: CPT | Performed by: PSYCHIATRY & NEUROLOGY
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
## 2018-06-25 NOTE — ASSESSMENT & PLAN NOTE
Obtain records from [facility] and [facility]    Continue Keppra 750 mg BID

## 2018-06-27 ENCOUNTER — TRANSCRIBE ORDERS (OUTPATIENT)
Dept: ADMINISTRATIVE | Facility: HOSPITAL | Age: 63
End: 2018-06-27

## 2018-06-27 DIAGNOSIS — I20.0 UNSTABLE ANGINA (HCC): Primary | ICD-10-CM

## 2018-07-06 ENCOUNTER — APPOINTMENT (OUTPATIENT)
Dept: OTHER | Facility: HOSPITAL | Age: 63
End: 2018-07-06

## 2018-07-06 ENCOUNTER — HOSPITAL ENCOUNTER (OUTPATIENT)
Dept: MAMMOGRAPHY | Facility: HOSPITAL | Age: 63
Discharge: HOME | End: 2018-07-06
Admitting: NURSE PRACTITIONER

## 2018-07-06 DIAGNOSIS — Z92.89 H/O MAMMOGRAM: ICD-10-CM

## 2018-07-06 DIAGNOSIS — Z12.31 VISIT FOR SCREENING MAMMOGRAM: ICD-10-CM

## 2018-07-06 PROCEDURE — 77063 BREAST TOMOSYNTHESIS BI: CPT

## 2018-07-06 PROCEDURE — 77063 BREAST TOMOSYNTHESIS BI: CPT | Performed by: RADIOLOGY

## 2018-07-06 PROCEDURE — 77067 SCR MAMMO BI INCL CAD: CPT

## 2018-07-06 PROCEDURE — 77067 SCR MAMMO BI INCL CAD: CPT | Performed by: RADIOLOGY

## 2018-07-12 ENCOUNTER — HOSPITAL ENCOUNTER (OUTPATIENT)
Facility: HOSPITAL | Age: 63
Discharge: HOME | End: 2018-07-12
Attending: INTERNAL MEDICINE | Admitting: INTERNAL MEDICINE

## 2018-07-12 VITALS
RESPIRATION RATE: 16 BRPM | SYSTOLIC BLOOD PRESSURE: 131 MMHG | OXYGEN SATURATION: 91 % | TEMPERATURE: 97.5 F | WEIGHT: 143.96 LBS | HEIGHT: 60 IN | DIASTOLIC BLOOD PRESSURE: 59 MMHG | HEART RATE: 74 BPM | BODY MASS INDEX: 28.26 KG/M2

## 2018-07-12 DIAGNOSIS — I20.0 UNSTABLE ANGINA (HCC): ICD-10-CM

## 2018-07-12 LAB
ANION GAP SERPL CALCULATED.3IONS-SCNC: 6 MMOL/L (ref 3–11)
BUN BLD-MCNC: 24 MG/DL (ref 9–23)
BUN/CREAT SERPL: 19.4 (ref 7–25)
CALCIUM SPEC-SCNC: 9.3 MG/DL (ref 8.7–10.4)
CHLORIDE SERPL-SCNC: 104 MMOL/L (ref 99–109)
CO2 SERPL-SCNC: 29 MMOL/L (ref 20–31)
CREAT BLD-MCNC: 1.24 MG/DL (ref 0.6–1.3)
DEPRECATED RDW RBC AUTO: 44.7 FL (ref 37–54)
ERYTHROCYTE [DISTWIDTH] IN BLOOD BY AUTOMATED COUNT: 13.9 % (ref 11.3–14.5)
GFR SERPL CREATININE-BSD FRML MDRD: 44 ML/MIN/1.73
GLUCOSE BLD-MCNC: 180 MG/DL (ref 70–100)
HCT VFR BLD AUTO: 34.8 % (ref 34.5–44)
HGB BLD-MCNC: 11 G/DL (ref 11.5–15.5)
MCH RBC QN AUTO: 28 PG (ref 27–31)
MCHC RBC AUTO-ENTMCNC: 31.6 G/DL (ref 32–36)
MCV RBC AUTO: 88.5 FL (ref 80–99)
PLATELET # BLD AUTO: 450 10*3/MM3 (ref 150–450)
PMV BLD AUTO: 10.4 FL (ref 6–12)
POTASSIUM BLD-SCNC: 4 MMOL/L (ref 3.5–5.5)
RBC # BLD AUTO: 3.93 10*6/MM3 (ref 3.89–5.14)
SODIUM BLD-SCNC: 139 MMOL/L (ref 132–146)
WBC NRBC COR # BLD: 10 10*3/MM3 (ref 3.5–10.8)

## 2018-07-12 PROCEDURE — 25010000002 MIDAZOLAM PER 1 MG: Performed by: INTERNAL MEDICINE

## 2018-07-12 PROCEDURE — 25010000002 HEPARIN (PORCINE) PER 1000 UNITS: Performed by: INTERNAL MEDICINE

## 2018-07-12 PROCEDURE — C1769 GUIDE WIRE: HCPCS | Performed by: INTERNAL MEDICINE

## 2018-07-12 PROCEDURE — 0 IOPAMIDOL PER 1 ML: Performed by: INTERNAL MEDICINE

## 2018-07-12 PROCEDURE — C1894 INTRO/SHEATH, NON-LASER: HCPCS | Performed by: INTERNAL MEDICINE

## 2018-07-12 PROCEDURE — 80048 BASIC METABOLIC PNL TOTAL CA: CPT | Performed by: INTERNAL MEDICINE

## 2018-07-12 PROCEDURE — 25010000002 FENTANYL CITRATE (PF) 100 MCG/2ML SOLUTION: Performed by: INTERNAL MEDICINE

## 2018-07-12 PROCEDURE — 93461 R&L HRT ART/VENTRICLE ANGIO: CPT | Performed by: INTERNAL MEDICINE

## 2018-07-12 PROCEDURE — 85027 COMPLETE CBC AUTOMATED: CPT | Performed by: INTERNAL MEDICINE

## 2018-07-12 PROCEDURE — 36415 COLL VENOUS BLD VENIPUNCTURE: CPT

## 2018-07-12 RX ORDER — ALPRAZOLAM 0.25 MG/1
0.25 TABLET ORAL 3 TIMES DAILY PRN
Status: DISCONTINUED | OUTPATIENT
Start: 2018-07-12 | End: 2018-07-12 | Stop reason: HOSPADM

## 2018-07-12 RX ORDER — MIDAZOLAM HYDROCHLORIDE 1 MG/ML
INJECTION INTRAMUSCULAR; INTRAVENOUS AS NEEDED
Status: DISCONTINUED | OUTPATIENT
Start: 2018-07-12 | End: 2018-07-12 | Stop reason: HOSPADM

## 2018-07-12 RX ORDER — NICOTINE POLACRILEX 4 MG
15 LOZENGE BUCCAL
Status: DISCONTINUED | OUTPATIENT
Start: 2018-07-12 | End: 2018-07-12 | Stop reason: HOSPADM

## 2018-07-12 RX ORDER — TEMAZEPAM 7.5 MG/1
7.5 CAPSULE ORAL NIGHTLY PRN
Status: DISCONTINUED | OUTPATIENT
Start: 2018-07-12 | End: 2018-07-12 | Stop reason: HOSPADM

## 2018-07-12 RX ORDER — ASPIRIN 81 MG/1
81 TABLET ORAL DAILY
Status: DISCONTINUED | OUTPATIENT
Start: 2018-07-12 | End: 2018-07-12 | Stop reason: HOSPADM

## 2018-07-12 RX ORDER — LIDOCAINE HYDROCHLORIDE 10 MG/ML
INJECTION, SOLUTION EPIDURAL; INFILTRATION; INTRACAUDAL; PERINEURAL AS NEEDED
Status: DISCONTINUED | OUTPATIENT
Start: 2018-07-12 | End: 2018-07-12 | Stop reason: HOSPADM

## 2018-07-12 RX ORDER — SODIUM CHLORIDE 9 MG/ML
250 INJECTION, SOLUTION INTRAVENOUS CONTINUOUS
Status: ACTIVE | OUTPATIENT
Start: 2018-07-12 | End: 2018-07-12

## 2018-07-12 RX ORDER — FENTANYL CITRATE 50 UG/ML
INJECTION, SOLUTION INTRAMUSCULAR; INTRAVENOUS AS NEEDED
Status: DISCONTINUED | OUTPATIENT
Start: 2018-07-12 | End: 2018-07-12 | Stop reason: HOSPADM

## 2018-07-12 RX ORDER — HYDROCODONE BITARTRATE AND ACETAMINOPHEN 5; 325 MG/1; MG/1
1 TABLET ORAL EVERY 4 HOURS PRN
Status: DISCONTINUED | OUTPATIENT
Start: 2018-07-12 | End: 2018-07-12 | Stop reason: HOSPADM

## 2018-07-12 RX ORDER — ACETAMINOPHEN 325 MG/1
650 TABLET ORAL EVERY 4 HOURS PRN
Status: DISCONTINUED | OUTPATIENT
Start: 2018-07-12 | End: 2018-07-12 | Stop reason: HOSPADM

## 2018-07-12 RX ORDER — DEXTROSE MONOHYDRATE 25 G/50ML
25 INJECTION, SOLUTION INTRAVENOUS
Status: DISCONTINUED | OUTPATIENT
Start: 2018-07-12 | End: 2018-07-12 | Stop reason: HOSPADM

## 2018-07-12 RX ADMIN — ASPIRIN 81 MG: 81 TABLET, COATED ORAL at 12:16

## 2018-07-27 ENCOUNTER — HOSPITAL ENCOUNTER (OUTPATIENT)
Dept: MAMMOGRAPHY | Facility: HOSPITAL | Age: 63
Discharge: HOME | End: 2018-07-27
Admitting: RADIOLOGY

## 2018-07-27 DIAGNOSIS — R92.8 ABNORMAL MAMMOGRAM: ICD-10-CM

## 2018-07-27 PROCEDURE — 77065 DX MAMMO INCL CAD UNI: CPT

## 2018-07-27 PROCEDURE — 77065 DX MAMMO INCL CAD UNI: CPT | Performed by: RADIOLOGY

## 2018-08-01 ENCOUNTER — TELEPHONE (OUTPATIENT)
Dept: MAMMOGRAPHY | Facility: HOSPITAL | Age: 63
End: 2018-08-01

## 2018-08-01 NOTE — TELEPHONE ENCOUNTER
Returned pt call, notified antithrombotic/blood thinner clearance has not yet been received from pt's provider. Explained procedure to pt & her daughter. They were told pt would receive a call with  instructions concerning medication then she will be contacted by [facility] scheduling to schedule procedure. Pt states she will contact prescribing provider to send clearance form. Pt verbalized understanding.

## 2018-08-23 ENCOUNTER — TELEPHONE (OUTPATIENT)
Dept: MAMMOGRAPHY | Facility: HOSPITAL | Age: 63
End: 2018-08-23

## 2018-08-23 NOTE — TELEPHONE ENCOUNTER
Pt contact to notify her antithrombotic/blood thinner clearance received from pt's provider. Pt states she is no longer on Eliquis. She was recently admitted to  Hospital for other health issues and discharged on Heparin. States Dr Mendosa not yet aware of change in medication. Pt is to call back with appointment information. Pt verbalized understanding. Dr Lugo is aware.

## 2018-08-24 ENCOUNTER — TELEPHONE (OUTPATIENT)
Dept: MAMMOGRAPHY | Facility: HOSPITAL | Age: 63
End: 2018-08-24

## 2018-08-24 NOTE — TELEPHONE ENCOUNTER
Notified Vanessa at Dr MISHA Mendosa's office pt's breast bx was postponed until further notice because of other pressing health issues & that the pt was now taking Heparin instead of Eliquis. Offered to speak with Dr Deondre sarmiento.

## 2023-10-25 ENCOUNTER — HOSPITAL ENCOUNTER (OUTPATIENT)
Facility: HOSPITAL | Age: 68
Setting detail: OBSERVATION
LOS: 1 days | Discharge: HOME HEALTH | End: 2023-10-28
Attending: EMERGENCY MEDICINE | Admitting: INTERNAL MEDICINE
Payer: COMMERCIAL

## 2023-10-25 DIAGNOSIS — R79.89 ELEVATED PROCALCITONIN: ICD-10-CM

## 2023-10-25 DIAGNOSIS — Z86.79 HISTORY OF CHF (CONGESTIVE HEART FAILURE): ICD-10-CM

## 2023-10-25 DIAGNOSIS — Z86.39 HISTORY OF DIABETES MELLITUS: ICD-10-CM

## 2023-10-25 DIAGNOSIS — G40.109 LOCALIZATION-RELATED SYMPTOMATIC EPILEPSY AND EPILEPTIC SYNDROMES WITH SIMPLE PARTIAL SEIZURES, NOT INTRACTABLE, WITHOUT STATUS EPILEPTICUS: ICD-10-CM

## 2023-10-25 DIAGNOSIS — J18.9 PNEUMONIA OF BOTH LUNGS DUE TO INFECTIOUS ORGANISM, UNSPECIFIED PART OF LUNG: ICD-10-CM

## 2023-10-25 DIAGNOSIS — Z86.79 HISTORY OF HYPERTENSION: ICD-10-CM

## 2023-10-25 DIAGNOSIS — R06.02 SHORTNESS OF BREATH: ICD-10-CM

## 2023-10-25 DIAGNOSIS — I20.0 UNSTABLE ANGINA: ICD-10-CM

## 2023-10-25 DIAGNOSIS — Z87.09 HISTORY OF COPD: ICD-10-CM

## 2023-10-25 DIAGNOSIS — Z86.39 HISTORY OF HYPERLIPIDEMIA: ICD-10-CM

## 2023-10-25 DIAGNOSIS — J18.9 PNEUMONIA OF BOTH LOWER LOBES DUE TO INFECTIOUS ORGANISM: Primary | ICD-10-CM

## 2023-10-25 DIAGNOSIS — M00.80 ARTHRITIS DUE TO OTHER BACTERIA, UNSPECIFIED JOINT: ICD-10-CM

## 2023-10-25 DIAGNOSIS — R60.0 PEDAL EDEMA: ICD-10-CM

## 2023-10-25 DIAGNOSIS — R79.89 OTHER SPECIFIED ABNORMAL FINDINGS OF BLOOD CHEMISTRY: ICD-10-CM

## 2023-10-25 DIAGNOSIS — R09.02 HYPOXIA: ICD-10-CM

## 2023-10-25 DIAGNOSIS — Z86.79 HISTORY OF CORONARY ARTERY DISEASE: ICD-10-CM

## 2023-10-25 DIAGNOSIS — I10 ESSENTIAL (PRIMARY) HYPERTENSION: ICD-10-CM

## 2023-10-25 DIAGNOSIS — I25.119 CORONARY ARTERY DISEASE INVOLVING NATIVE CORONARY ARTERY OF NATIVE HEART WITH ANGINA PECTORIS: ICD-10-CM

## 2023-10-25 DIAGNOSIS — E78.5 HYPERLIPIDEMIA LDL GOAL <70: ICD-10-CM

## 2023-10-25 DIAGNOSIS — F33.2 MAJOR DEPRESSIVE DISORDER, RECURRENT SEVERE WITHOUT PSYCHOTIC FEATURES: ICD-10-CM

## 2023-10-25 DIAGNOSIS — D68.59 PRIMARY HYPERCOAGULABLE STATE: ICD-10-CM

## 2023-10-25 DIAGNOSIS — Z79.01 CHRONIC ANTICOAGULATION: ICD-10-CM

## 2023-10-25 DIAGNOSIS — S06.5XAA SDH (SUBDURAL HEMATOMA): ICD-10-CM

## 2023-10-25 DIAGNOSIS — Z86.711 HISTORY OF PULMONARY EMBOLISM: ICD-10-CM

## 2023-10-25 DIAGNOSIS — N39.0 ACUTE UTI: ICD-10-CM

## 2023-10-25 LAB
AMPHET+METHAMPHET UR QL: NEGATIVE
AMPHETAMINES UR QL: NEGATIVE
BACTERIA UR QL AUTO: ABNORMAL /HPF
BARBITURATES UR QL SCN: NEGATIVE
BENZODIAZ UR QL SCN: NEGATIVE
BILIRUB UR QL STRIP: NEGATIVE
BUPRENORPHINE SERPL-MCNC: NEGATIVE NG/ML
CANNABINOIDS SERPL QL: NEGATIVE
CLARITY UR: ABNORMAL
COCAINE UR QL: NEGATIVE
COLOR UR: YELLOW
FLUAV RNA RESP QL NAA+PROBE: NOT DETECTED
FLUBV RNA RESP QL NAA+PROBE: NOT DETECTED
GLUCOSE UR STRIP-MCNC: NEGATIVE MG/DL
HGB UR QL STRIP.AUTO: NEGATIVE
HYALINE CASTS UR QL AUTO: ABNORMAL /LPF
KETONES UR QL STRIP: NEGATIVE
LEUKOCYTE ESTERASE UR QL STRIP.AUTO: ABNORMAL
METHADONE UR QL SCN: NEGATIVE
NITRITE UR QL STRIP: POSITIVE
OPIATES UR QL: POSITIVE
OXYCODONE UR QL SCN: NEGATIVE
PCP UR QL SCN: NEGATIVE
PH UR STRIP.AUTO: 8.5 [PH] (ref 5–8)
PROPOXYPH UR QL: NEGATIVE
PROT UR QL STRIP: NEGATIVE
RBC # UR STRIP: ABNORMAL /HPF
REF LAB TEST METHOD: ABNORMAL
SARS-COV-2 RNA RESP QL NAA+PROBE: NOT DETECTED
SP GR UR STRIP: 1.01 (ref 1–1.03)
SQUAMOUS #/AREA URNS HPF: ABNORMAL /HPF
TRICYCLICS UR QL SCN: NEGATIVE
UROBILINOGEN UR QL STRIP: ABNORMAL
WBC # UR STRIP: ABNORMAL /HPF

## 2023-10-25 PROCEDURE — 87086 URINE CULTURE/COLONY COUNT: CPT | Performed by: PHYSICIAN ASSISTANT

## 2023-10-25 PROCEDURE — 99285 EMERGENCY DEPT VISIT HI MDM: CPT

## 2023-10-25 PROCEDURE — 81001 URINALYSIS AUTO W/SCOPE: CPT | Performed by: PHYSICIAN ASSISTANT

## 2023-10-25 PROCEDURE — 87077 CULTURE AEROBIC IDENTIFY: CPT | Performed by: PHYSICIAN ASSISTANT

## 2023-10-25 PROCEDURE — 80307 DRUG TEST PRSMV CHEM ANLYZR: CPT | Performed by: PHYSICIAN ASSISTANT

## 2023-10-25 PROCEDURE — 93005 ELECTROCARDIOGRAM TRACING: CPT | Performed by: PHYSICIAN ASSISTANT

## 2023-10-25 PROCEDURE — 87636 SARSCOV2 & INF A&B AMP PRB: CPT | Performed by: PHYSICIAN ASSISTANT

## 2023-10-25 PROCEDURE — 87186 SC STD MICRODIL/AGAR DIL: CPT | Performed by: PHYSICIAN ASSISTANT

## 2023-10-25 RX ORDER — SODIUM CHLORIDE 0.9 % (FLUSH) 0.9 %
10 SYRINGE (ML) INJECTION AS NEEDED
Status: DISCONTINUED | OUTPATIENT
Start: 2023-10-25 | End: 2023-10-28 | Stop reason: HOSPADM

## 2023-10-25 NOTE — ED PROVIDER NOTES
Subjective   History of Present Illness  This is a 68-year-old female that presents the ER with increased bilateral lower extremity swelling with redness, warmth, and weeping x2 weeks.  She denies fever, chills, body aches, or nausea/vomiting.  She also reports exertional shortness of breath.  She does not utilize home oxygen supplementation.  She reports history of CHF but she has not taken any diuretic for the last several years.  She says that pedal edema has been stable and controlled.  She has history of CAD with four-vessel CABG.  Her last cardiac catheterization was in 2018 which revealed mild CAD and 4/4 bypass.  Patient says that she had recent chemical stress test in [place] approximately 3 weeks ago and there was no intervention.  Patient has past medical history of chronic pain syndrome, type 2 diabetes mellitus, history of MI, CAD with four-vessel CABG, hypertension, anxiety, osteoarthritis, hyperlipidemia, depression, ASHD, history of pulmonary embolism, chronic anticoagulation of Eliquis.  Patient says that she has not been taking her blood thinners for the last 3 days.  She denies any cough or congestion.  No other concerns at this time.  She also reports anorexia but denies vomiting or diarrhea.  She later tells me in the history that she has had some right upper quadrant abdominal pain x2 days.  She denies any flank or CVA pain.  She denies dysuria, urgency, or frequency.  She has had previous cholecystectomy.    History provided by:  Patient and spouse  Leg Swelling  Location:  Bilateral lower extremity redness/swelling with weeping, SOA with exertion.  Duration:  2 weeks  Timing:  Constant  Progression:  Worsening  Chronicity:  New  Context:  Pt just moved back from [place].  Recent stress test 3 wks ago; no intervention. 2 wk h/o bilateral lower leg swelling, redness, warmth, SOA.  Associated symptoms: abdominal pain (Right upper quadrant abdominal pain x2 days.  History of cholecystectomy.),  fatigue and shortness of breath    Associated symptoms: no chest pain, no congestion, no cough, no diarrhea, no ear pain, no fever, no headaches, no myalgias, no nausea, no rhinorrhea, no sore throat, no vomiting and no wheezing    Associated symptoms comment:  Confluent erythema with warmth to bilateral lower extremities.  Risk factors:  History of CAD with 4-vessel CABG.  Pt reports that they recently lived in [place] and she had a chemical stress test 3 wks ago; no intervention or rec for heart cath. H/o CHF, but she hasn't taken diuretics in years.      Review of Systems   Constitutional:  Positive for activity change, appetite change and fatigue. Negative for chills, diaphoresis, fever and unexpected weight change.   HENT: Negative.  Negative for congestion, ear pain, postnasal drip, rhinorrhea, sinus pressure, sinus pain, sneezing and sore throat.    Respiratory:  Positive for shortness of breath. Negative for cough and wheezing.         History of COPD; second-hand exposure.  Non-smoker.   Cardiovascular:  Positive for leg swelling. Negative for chest pain and palpitations.        History of CAD and CHF.  No diuretics.  Pt reports chemical stress test 3 wks ago; normal per history.  Last heart cath was in 2018; mild CAD with 4/4 CABG.   Gastrointestinal:  Positive for abdominal pain (Right upper quadrant abdominal pain x2 days.  History of cholecystectomy.). Negative for diarrhea, nausea and vomiting.   Genitourinary: Negative.  Negative for dysuria, flank pain, frequency and urgency.   Musculoskeletal: Negative.  Negative for back pain and myalgias.   Skin:  Positive for color change (Confluent redness with mild warmth to the lower extremities.  Skin is dry and flaking.  No significant open wounds.).   Neurological:  Positive for weakness. Negative for dizziness, syncope and headaches.   All other systems reviewed and are negative.      Past Medical History:   Diagnosis Date    Anxiety     Arthritis     ASHD  (arteriosclerotic heart disease)     Cancer     Cervical disc disorder of mid-cervical region     Chronic pain     Depression     Depression     Diabetes mellitus     Heart disease     Hyperlipidemia     Hypertension     MRSA carrier     Myocardial infarction     Pulmonary embolism        Allergies   Allergen Reactions    Penicillins Anaphylaxis    Abilify [Aripiprazole]      Unknown reaction    Lipitor [Atorvastatin]      Causes hair to fall out    Narcan [Naloxone Hcl]      Caused a heart attack    Neosporin [Neomycin-Bacitracin Zn-Polymyx]      Blisters    Adhesive Tape Rash    Flexeril [Cyclobenzaprine] Rash    Vioxx [Rofecoxib] Rash       Past Surgical History:   Procedure Laterality Date    BREAST EXCISIONAL BIOPSY Right     x 2    CARDIAC CATHETERIZATION N/A 8/22/2016    Procedure: Left Heart Cath with +/- CBI;  Surgeon: Bernard Palumbo MD;  Location:  [facility];  Service:     CARDIAC CATHETERIZATION Bilateral 7/12/2018    Procedure: Right and Left Heart Cath;  Surgeon: Seth Farnsworth MD;  Location:  [facility];  Service: Cardiovascular    CORONARY ARTERY BYPASS GRAFT      CORONARY ARTERY BYPASS GRAFT      HYSTERECTOMY      OOPHORECTOMY      PAIN PUMP INSERTION/REVISION      PAIN PUMP INSERTION/REVISION      REDUCTION MAMMAPLASTY      early 80's       Family History   Problem Relation Age of Onset    Breast cancer Maternal Grandmother     Ovarian cancer Neg Hx        Social History     Socioeconomic History    Marital status:    Tobacco Use    Smoking status: Never    Smokeless tobacco: Never   Substance and Sexual Activity    Alcohol use: No    Drug use: No    Sexual activity: Defer           Objective   Physical Exam  Vitals and nursing note reviewed.   Constitutional:       General: She is not in acute distress.     Appearance: Normal appearance. She is not ill-appearing, toxic-appearing or diaphoretic.      Comments: Generally weak.  Nontoxic.   No acute distress.   HENT:      Head: Normocephalic and atraumatic.      Nose: Nose normal.      Mouth/Throat:      Mouth: Mucous membranes are moist.      Pharynx: Oropharynx is clear.      Comments: Oral mucous membranes are still moist  Eyes:      Extraocular Movements: Extraocular movements intact.      Conjunctiva/sclera: Conjunctivae normal.      Pupils: Pupils are equal, round, and reactive to light.   Cardiovascular:      Rate and Rhythm: Normal rate and regular rhythm. No extrasystoles are present.     Pulses: Normal pulses.           Dorsalis pedis pulses are 2+ on the right side and 2+ on the left side.        Posterior tibial pulses are 2+ on the right side and 2+ on the left side.      Heart sounds: Normal heart sounds.      Comments: Regular rate and rhythm.  No ectopy.  1-2+ pitting edema to bilateral lower legs which does not extend above the knee.  Confluent erythema and warmth.  Skin is dry and flaking.  Positive distal pulses bilaterally.  Pulmonary:      Effort: Pulmonary effort is normal. Tachypnea present. No accessory muscle usage or retractions.      Breath sounds: Examination of the right-lower field reveals decreased breath sounds. Examination of the left-lower field reveals decreased breath sounds. Decreased breath sounds present. No wheezing, rhonchi or rales.      Comments: Mild tachypnea with conversation.  No retractions or accessory muscle use.  Decreased breath sounds to the bilateral bases.  No wheezes, rhonchi, or rales.  Abdominal:      General: Bowel sounds are normal. There is no distension.      Palpations: Abdomen is soft.      Tenderness: There is abdominal tenderness in the right upper quadrant. There is no right CVA tenderness, left CVA tenderness, guarding or rebound. Negative signs include Shipley's sign.      Comments: Abdomen soft without distention.  Active bowel sounds in all 4 quadrants.  Mild tenderness with deep palpation to the right upper quadrant.  Patient is  status postcholecystectomy.  No lower abdominal tenderness and no CVA tenderness.   Musculoskeletal:         General: Normal range of motion.      Cervical back: Normal range of motion and neck supple.      Right lower le+ Edema present.      Left lower le+ Edema present.   Skin:     General: Skin is warm and dry.   Neurological:      General: No focal deficit present.      Mental Status: She is alert.         Procedures           ED Course  ED Course as of 10/26/23 0223   Wed Oct 25, 2023   1936 Pt is supposed to be taking Eliquis but she hasn't taken it in 3 days. History of PE; almost  from them. [FC]   Thu Oct 26, 2023   0026 EKG was difficult to read because patient has bladder stimulator.  Reviewed EKG with Dr. Pineda, ER attending physician.  CBC revealed white blood cell count of 12,000.  H&H is 10 and 33.  Differential shows 82% neutrophils.  UDS is positive for opiates which patient is prescribing urinalysis reveals 21-50 white blood cells, 4+ bacteria, moderate leukocytes and positive nitrite.  No previous urine cultures in epic for comparison.  Patient tested negative for COVID-19 and influenza.  Patient has strong allergy to penicillin with anaphylaxis and she cannot remember if she has had cephalosporins before. [FC]   0214 CTA of the chest with contrast reveals no evidence of pulmonary embolism.  Multifocal patchy and rounded infiltrates consistent with pneumonia.  These are noted throughout the right upper lobe and bilateral lower lobes there is no mediastinal or hilar lymphadenopathy and no axillary or supraclavicular lymphadenopathy.  No evidence of overt failure or pleural effusion.  CT of the abdomen/pelvis with contrast reveals bilateral lower lobe pneumonia and left-sided hydroureteronephrosis with a duplicated left collecting system.  It is the left lower pole mostly which appears to be dilated and likely secondary to reflux.  There are small cysts on the right kidney but no  evidence of nephrolithiasis.  Discussed the case with Dr. Christopher, ER attending physician and we will initiate Rocephin and doxycycline.  Blood cultures x2 sets are in process.  Discussed admission with Dr. Bryant, and he is agreeable to admission on telemetry.  I updated patient and  on all results and need for admission and they are very appreciative  With increased pedal edema and history of CHF, recommend echocardiogram in the a.m.  We will repeat 2-hour troponin. [FC]      ED Course User Index  [FC] Mayra Woodruff PA-C           Recent Results (from the past 24 hour(s))   COVID-19 and FLU A/B PCR - Swab, Nasopharynx    Collection Time: 10/25/23  7:51 PM    Specimen: Nasopharynx; Swab   Result Value Ref Range    COVID19 Not Detected Not Detected - Ref. Range    Influenza A PCR Not Detected Not Detected    Influenza B PCR Not Detected Not Detected   Urinalysis With Microscopic If Indicated (No Culture) - Urine, Clean Catch    Collection Time: 10/25/23 11:12 PM    Specimen: Urine, Clean Catch   Result Value Ref Range    Color, UA Yellow Yellow, Straw    Appearance, UA Cloudy (A) Clear    pH, UA 8.5 (H) 5.0 - 8.0    Specific Gravity, UA 1.012 1.001 - 1.030    Glucose, UA Negative Negative    Ketones, UA Negative Negative    Bilirubin, UA Negative Negative    Blood, UA Negative Negative    Protein, UA Negative Negative    Leuk Esterase, UA Moderate (2+) (A) Negative    Nitrite, UA Positive (A) Negative    Urobilinogen, UA 1.0 E.U./dL 0.2 - 1.0 E.U./dL   Urine Drug Screen - Urine, Clean Catch    Collection Time: 10/25/23 11:12 PM    Specimen: Urine, Clean Catch   Result Value Ref Range    THC, Screen, Urine Negative Negative    Phencyclidine (PCP), Urine Negative Negative    Cocaine Screen, Urine Negative Negative    Methamphetamine, Ur Negative Negative    Opiate Screen Positive (A) Negative    Amphetamine Screen, Urine Negative Negative    Benzodiazepine Screen, Urine Negative Negative    Tricyclic  Antidepressants Screen Negative Negative    Methadone Screen, Urine Negative Negative    Barbiturates Screen, Urine Negative Negative    Oxycodone Screen, Urine Negative Negative    Propoxyphene Screen Negative Negative    Buprenorphine, Screen, Urine Negative Negative   Fentanyl, Urine - Urine, Clean Catch    Collection Time: 10/25/23 11:12 PM    Specimen: Urine, Clean Catch   Result Value Ref Range    Fentanyl, Urine Negative Negative   Urinalysis, Microscopic Only - Urine, Clean Catch    Collection Time: 10/25/23 11:12 PM    Specimen: Urine, Clean Catch   Result Value Ref Range    RBC, UA 0-2 None Seen, 0-2 /HPF    WBC, UA 21-50 (A) None Seen, 0-2 /HPF    Bacteria, UA 4+ (A) None Seen, Trace /HPF    Squamous Epithelial Cells, UA 7-12 (A) None Seen, 0-2 /HPF    Hyaline Casts, UA 0-6 0 - 6 /LPF    Methodology Automated Microscopy    ECG 12 Lead Other; pedal edema    Collection Time: 10/25/23 11:36 PM   Result Value Ref Range    QT Interval 420 ms    QTC Interval 405 ms   Comprehensive Metabolic Panel    Collection Time: 10/26/23 12:01 AM    Specimen: Blood   Result Value Ref Range    Glucose 100 (H) 65 - 99 mg/dL    BUN 19 8 - 23 mg/dL    Creatinine 0.92 0.57 - 1.00 mg/dL    Sodium 138 136 - 145 mmol/L    Potassium 3.8 3.5 - 5.2 mmol/L    Chloride 98 98 - 107 mmol/L    CO2 31.0 (H) 22.0 - 29.0 mmol/L    Calcium 9.0 8.6 - 10.5 mg/dL    Total Protein 7.2 6.0 - 8.5 g/dL    Albumin 3.5 3.5 - 5.2 g/dL    ALT (SGPT) 9 1 - 33 U/L    AST (SGOT) 17 1 - 32 U/L    Alkaline Phosphatase 91 39 - 117 U/L    Total Bilirubin 0.4 0.0 - 1.2 mg/dL    Globulin 3.7 gm/dL    A/G Ratio 0.9 g/dL    BUN/Creatinine Ratio 20.7 7.0 - 25.0    Anion Gap 9.0 5.0 - 15.0 mmol/L    eGFR 68.0 >60.0 mL/min/1.73   Lactate Dehydrogenase    Collection Time: 10/26/23 12:01 AM    Specimen: Blood   Result Value Ref Range     135 - 214 U/L   Procalcitonin    Collection Time: 10/26/23 12:01 AM    Specimen: Blood   Result Value Ref Range     Procalcitonin 1.21 (H) 0.00 - 0.25 ng/mL   C-reactive Protein    Collection Time: 10/26/23 12:01 AM    Specimen: Blood   Result Value Ref Range    C-Reactive Protein 8.99 (H) 0.00 - 0.50 mg/dL   Lactic Acid, Plasma    Collection Time: 10/26/23 12:01 AM    Specimen: Blood   Result Value Ref Range    Lactate 0.9 0.5 - 2.0 mmol/L   BNP    Collection Time: 10/26/23 12:01 AM    Specimen: Blood   Result Value Ref Range    proBNP 537.0 0.0 - 900.0 pg/mL   Single High Sensitivity Troponin T    Collection Time: 10/26/23 12:01 AM    Specimen: Blood   Result Value Ref Range    HS Troponin T 22 (H) <10 ng/L   CBC Auto Differential    Collection Time: 10/26/23 12:01 AM    Specimen: Blood   Result Value Ref Range    WBC 12.48 (H) 3.40 - 10.80 10*3/mm3    RBC 3.62 (L) 3.77 - 5.28 10*6/mm3    Hemoglobin 10.2 (L) 12.0 - 15.9 g/dL    Hematocrit 33.4 (L) 34.0 - 46.6 %    MCV 92.3 79.0 - 97.0 fL    MCH 28.2 26.6 - 33.0 pg    MCHC 30.5 (L) 31.5 - 35.7 g/dL    RDW 13.2 12.3 - 15.4 %    RDW-SD 44.2 37.0 - 54.0 fl    MPV 10.9 6.0 - 12.0 fL    Platelets 263 140 - 450 10*3/mm3    Neutrophil % 82.3 (H) 42.7 - 76.0 %    Lymphocyte % 13.8 (L) 19.6 - 45.3 %    Monocyte % 3.2 (L) 5.0 - 12.0 %    Eosinophil % 0.1 (L) 0.3 - 6.2 %    Basophil % 0.2 0.0 - 1.5 %    Immature Grans % 0.4 0.0 - 0.5 %    Neutrophils, Absolute 10.28 (H) 1.70 - 7.00 10*3/mm3    Lymphocytes, Absolute 1.72 0.70 - 3.10 10*3/mm3    Monocytes, Absolute 0.40 0.10 - 0.90 10*3/mm3    Eosinophils, Absolute 0.01 0.00 - 0.40 10*3/mm3    Basophils, Absolute 0.02 0.00 - 0.20 10*3/mm3    Immature Grans, Absolute 0.05 0.00 - 0.05 10*3/mm3    nRBC 0.0 0.0 - 0.2 /100 WBC   POC Creatinine    Collection Time: 10/26/23 12:11 AM    Specimen: Blood   Result Value Ref Range    Creatinine 0.90 0.60 - 1.30 mg/dL   POC Creatinine    Collection Time: 10/26/23 12:29 AM    Specimen: Blood   Result Value Ref Range    Creatinine 0.90 mg/dL     Note: In addition to lab results from this visit, the labs  listed above may include labs taken at another facility or during a different encounter within the last 24 hours. Please correlate lab times with ED admission and discharge times for further clarification of the services performed during this visit.    CT Angiogram Chest   Final Result   Impression:   1.No evidence of pulmonary embolism.   2.Multifocal patchy and rounded infiltrates consistent with pneumonia.            Electronically Signed: Sree Sutton MD     10/26/2023 1:42 AM EDT     Workstation ID: [id number]      CT Abdomen Pelvis With Contrast   Final Result   Impression:   1.Bilateral lower lobe pneumonia.   2.Left-sided hydroureteronephrosis with a duplicated left collecting system. It is the left lower pole moiety which appears to be dilated and likely is secondary to reflux.            Electronically Signed: Sree Sutton MD     10/26/2023 1:48 AM EDT     Workstation ID: [id number]        Vitals:    10/26/23 0031 10/26/23 0130 10/26/23 0200 10/26/23 0201   BP:  145/49 129/55    Pulse:  67 62    Resp:       Temp:       TempSrc:       SpO2: 95% 93%  92%   Weight:       Height:         Medications   sodium chloride 0.9 % flush 10 mL (has no administration in time range)   cefTRIAXone (ROCEPHIN) 1,000 mg in sodium chloride 0.9 % 100 mL IVPB (has no administration in time range)   doxycycline (VIBRAMYCIN) 100 mg in sodium chloride 0.9 % 100 mL IVPB (has no administration in time range)   oxyCODONE (ROXICODONE) immediate release tablet 5 mg (5 mg Oral Given 10/26/23 0033)   iopamidol (ISOVUE-370) 76 % injection 75 mL (75 mL Intravenous Given 10/26/23 0102)     ECG/EMG Results (last 24 hours)       ** No results found for the last 24 hours. **          ECG 12 Lead Other; pedal edema   Preliminary Result   Test Reason : Other~   Blood Pressure :   */*   mmHG   Vent. Rate :  56 BPM     Atrial Rate :  56 BPM      P-R Int : 160 ms          QRS Dur :  88 ms       QT Int : 420 ms       P-R-T Axes :  49  54  62 degrees       QTc Int : 405 ms      ** Suspect unspecified pacemaker failure   Sinus bradycardia   Possible Left atrial enlargement   Left ventricular hypertrophy   Abnormal ECG   When compared with ECG of 11-JUN-2018 00:22,   No significant change was found      Referred By: EDMD           Confirmed By:                                             Medical Decision Making  Problems Addressed:  Acute UTI: complicated acute illness or injury  History of CHF (congestive heart failure): complicated acute illness or injury  History of COPD: complicated acute illness or injury  History of coronary artery disease: complicated acute illness or injury  History of hyperlipidemia: complicated acute illness or injury  History of hypertension: complicated acute illness or injury  Hypoxia: complicated acute illness or injury  Pedal edema: complicated acute illness or injury  Pneumonia of both lower lobes due to infectious organism: complicated acute illness or injury  Shortness of breath: complicated acute illness or injury    Amount and/or Complexity of Data Reviewed  Labs: ordered.  Radiology: ordered.  ECG/medicine tests: ordered.    Risk  Prescription drug management.  Decision regarding hospitalization.        Final diagnoses:   Pneumonia of both lower lobes due to infectious organism   Hypoxia   Shortness of breath   Pedal edema   History of CHF (congestive heart failure)   History of coronary artery disease   History of hypertension   History of hyperlipidemia   History of COPD   Acute UTI   Elevated procalcitonin   History of diabetes mellitus   History of pulmonary embolism   Chronic anticoagulation       ED Disposition  ED Disposition       ED Disposition   Decision to Admit    Condition   --    Comment   Level of Care: Telemetry [5]   Diagnosis: Pneumonia [759035]   Admitting Physician: SONJA HO [[id number]]   Attending Physician: SONJA HO [[id number]]   Certification: I Certify That Inpatient Hospital Services Are Medically  Necessary For Greater Than 2 Midnights                 No follow-up provider specified.       Medication List      No changes were made to your prescriptions during this visit.            Mayra Woodruff PA-C  10/26/23 0228

## 2023-10-25 NOTE — Clinical Note
Level of Care: Telemetry [5]   Diagnosis: Pneumonia [[id number]]   Admitting Physician: SONJA HO [[id number]]   Attending Physician: SONJA HO [[id number]]

## 2023-10-26 ENCOUNTER — APPOINTMENT (OUTPATIENT)
Dept: CT IMAGING | Facility: HOSPITAL | Age: 68
End: 2023-10-26
Payer: COMMERCIAL

## 2023-10-26 ENCOUNTER — APPOINTMENT (OUTPATIENT)
Dept: CARDIOLOGY | Facility: HOSPITAL | Age: 68
End: 2023-10-26
Payer: COMMERCIAL

## 2023-10-26 PROBLEM — F33.2 MAJOR DEPRESSIVE DISORDER, RECURRENT SEVERE WITHOUT PSYCHOTIC FEATURES: Status: ACTIVE | Noted: 2017-11-27

## 2023-10-26 PROBLEM — R79.89 OTHER SPECIFIED ABNORMAL FINDINGS OF BLOOD CHEMISTRY: Status: ACTIVE | Noted: 2018-10-29

## 2023-10-26 PROBLEM — S06.5XAA SDH (SUBDURAL HEMATOMA): Status: ACTIVE | Noted: 2023-03-23

## 2023-10-26 PROBLEM — I10 ESSENTIAL (PRIMARY) HYPERTENSION: Status: ACTIVE | Noted: 2018-10-29

## 2023-10-26 PROBLEM — I27.82 CHRONIC PULMONARY EMBOLISM: Status: ACTIVE | Noted: 2018-10-29

## 2023-10-26 PROBLEM — M54.50 LOW BACK PAIN: Status: ACTIVE | Noted: 2018-10-29

## 2023-10-26 PROBLEM — J18.9 PNEUMONIA: Status: ACTIVE | Noted: 2023-10-26
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
## 2023-10-26 PROBLEM — F32.9 MAJOR DEPRESSIVE DISORDER, SINGLE EPISODE, UNSPECIFIED: Status: ACTIVE | Noted: 2018-10-29

## 2023-10-26 LAB
ALBUMIN SERPL-MCNC: 3.5 G/DL (ref 3.5–5.2)
ALBUMIN/GLOB SERPL: 0.9 G/DL
ALP SERPL-CCNC: 91 U/L (ref 39–117)
ALT SERPL W P-5'-P-CCNC: 9 U/L (ref 1–33)
ANION GAP SERPL CALCULATED.3IONS-SCNC: 6 MMOL/L (ref 5–15)
ANION GAP SERPL CALCULATED.3IONS-SCNC: 9 MMOL/L (ref 5–15)
ASCENDING AORTA: 2.7 CM
AST SERPL-CCNC: 17 U/L (ref 1–32)
BASOPHILS # BLD AUTO: 0.02 10*3/MM3 (ref 0–0.2)
BASOPHILS # BLD AUTO: 0.02 10*3/MM3 (ref 0–0.2)
BASOPHILS NFR BLD AUTO: 0.2 % (ref 0–1.5)
BASOPHILS NFR BLD AUTO: 0.2 % (ref 0–1.5)
BH CV ECHO MEAS - AO MAX PG: 12.5 MMHG
BH CV ECHO MEAS - AO MEAN PG: 6 MMHG
BH CV ECHO MEAS - AO ROOT DIAM: 2.8 CM
BH CV ECHO MEAS - AO V2 MAX: 176.7 CM/SEC
BH CV ECHO MEAS - AO V2 VTI: 36.5 CM
BH CV ECHO MEAS - AVA(I,D): 2.16 CM2
BH CV ECHO MEAS - EDV(CUBED): 83.3 ML
BH CV ECHO MEAS - EDV(MOD-SP2): 112 ML
BH CV ECHO MEAS - EDV(MOD-SP4): 72.3 ML
BH CV ECHO MEAS - EF(MOD-BP): 61 %
BH CV ECHO MEAS - EF(MOD-SP2): 68.1 %
BH CV ECHO MEAS - EF(MOD-SP4): 49.5 %
BH CV ECHO MEAS - ESV(CUBED): 16.8 ML
BH CV ECHO MEAS - ESV(MOD-SP2): 35.7 ML
BH CV ECHO MEAS - ESV(MOD-SP4): 36.5 ML
BH CV ECHO MEAS - FS: 41.4 %
BH CV ECHO MEAS - IVS/LVPW: 0.82 CM
BH CV ECHO MEAS - IVSD: 0.65 CM
BH CV ECHO MEAS - LA DIMENSION: 2.9 CM
BH CV ECHO MEAS - LAT PEAK E' VEL: 10 CM/SEC
BH CV ECHO MEAS - LV DIASTOLIC VOL/BSA (35-75): 50.1 CM2
BH CV ECHO MEAS - LV MASS(C)D: 94.1 GRAMS
BH CV ECHO MEAS - LV MAX PG: 8.4 MMHG
BH CV ECHO MEAS - LV MEAN PG: 4.2 MMHG
BH CV ECHO MEAS - LV SYSTOLIC VOL/BSA (12-30): 25.3 CM2
BH CV ECHO MEAS - LV V1 MAX: 145.3 CM/SEC
BH CV ECHO MEAS - LV V1 VTI: 31.1 CM
BH CV ECHO MEAS - LVIDD: 4.4 CM
BH CV ECHO MEAS - LVIDS: 2.6 CM
BH CV ECHO MEAS - LVOT AREA: 2.5 CM2
BH CV ECHO MEAS - LVOT DIAM: 1.8 CM
BH CV ECHO MEAS - LVPWD: 0.79 CM
BH CV ECHO MEAS - MED PEAK E' VEL: 6 CM/SEC
BH CV ECHO MEAS - MV A MAX VEL: 105.5 CM/SEC
BH CV ECHO MEAS - MV DEC SLOPE: 339.3 CM/SEC2
BH CV ECHO MEAS - MV DEC TIME: 0.27 SEC
BH CV ECHO MEAS - MV E MAX VEL: 90.1 CM/SEC
BH CV ECHO MEAS - MV E/A: 0.85
BH CV ECHO MEAS - MV MAX PG: 5.1 MMHG
BH CV ECHO MEAS - MV MEAN PG: 2.8 MMHG
BH CV ECHO MEAS - MV P1/2T: 84 MSEC
BH CV ECHO MEAS - MV V2 VTI: 34.1 CM
BH CV ECHO MEAS - MVA(P1/2T): 2.6 CM2
BH CV ECHO MEAS - MVA(VTI): 2.32 CM2
BH CV ECHO MEAS - PA ACC TIME: 0.12 SEC
BH CV ECHO MEAS - PA V2 MAX: 101.1 CM/SEC
BH CV ECHO MEAS - RAP SYSTOLE: 15 MMHG
BH CV ECHO MEAS - RVSP: 28.8 MMHG
BH CV ECHO MEAS - SI(MOD-SP2): 52.8 ML/M2
BH CV ECHO MEAS - SI(MOD-SP4): 24.8 ML/M2
BH CV ECHO MEAS - SV(LVOT): 79 ML
BH CV ECHO MEAS - SV(MOD-SP2): 76.2 ML
BH CV ECHO MEAS - SV(MOD-SP4): 35.8 ML
BH CV ECHO MEAS - TAPSE (>1.6): 1.6 CM
BH CV ECHO MEAS - TR MAX PG: 13.8 MMHG
BH CV ECHO MEAS - TR MAX VEL: 186.1 CM/SEC
BH CV ECHO MEASUREMENTS AVERAGE E/E' RATIO: 11.26
BH CV LOWER VASCULAR LEFT COMMON FEMORAL AUGMENT: NORMAL
BH CV LOWER VASCULAR LEFT COMMON FEMORAL COMPRESS: NORMAL
BH CV LOWER VASCULAR LEFT COMMON FEMORAL PHASIC: NORMAL
BH CV LOWER VASCULAR LEFT COMMON FEMORAL SPONT: NORMAL
BH CV LOWER VASCULAR LEFT DISTAL FEMORAL AUGMENT: NORMAL
BH CV LOWER VASCULAR LEFT DISTAL FEMORAL COMPRESS: NORMAL
BH CV LOWER VASCULAR LEFT DISTAL FEMORAL PHASIC: NORMAL
BH CV LOWER VASCULAR LEFT DISTAL FEMORAL SPONT: NORMAL
BH CV LOWER VASCULAR LEFT GASTRONEMIUS COMPRESS: NORMAL
BH CV LOWER VASCULAR LEFT GREATER SAPH AK COMPRESS: NORMAL
BH CV LOWER VASCULAR LEFT GREATER SAPH BK COMPRESS: NORMAL
BH CV LOWER VASCULAR LEFT LESSER SAPH COMPRESS: NORMAL
BH CV LOWER VASCULAR LEFT MID FEMORAL AUGMENT: NORMAL
BH CV LOWER VASCULAR LEFT MID FEMORAL COMPRESS: NORMAL
BH CV LOWER VASCULAR LEFT MID FEMORAL PHASIC: NORMAL
BH CV LOWER VASCULAR LEFT MID FEMORAL SPONT: NORMAL
BH CV LOWER VASCULAR LEFT PERONEAL COMPRESS: NORMAL
BH CV LOWER VASCULAR LEFT POPLITEAL AUGMENT: NORMAL
BH CV LOWER VASCULAR LEFT POPLITEAL COMPRESS: NORMAL
BH CV LOWER VASCULAR LEFT POPLITEAL PHASIC: NORMAL
BH CV LOWER VASCULAR LEFT POPLITEAL SPONT: NORMAL
BH CV LOWER VASCULAR LEFT POSTERIOR TIBIAL COMPRESS: NORMAL
BH CV LOWER VASCULAR LEFT PROFUNDA FEMORAL AUGMENT: NORMAL
BH CV LOWER VASCULAR LEFT PROFUNDA FEMORAL PHASIC: NORMAL
BH CV LOWER VASCULAR LEFT PROFUNDA FEMORAL SPONT: NORMAL
BH CV LOWER VASCULAR LEFT PROXIMAL FEMORAL AUGMENT: NORMAL
BH CV LOWER VASCULAR LEFT PROXIMAL FEMORAL COMPRESS: NORMAL
BH CV LOWER VASCULAR LEFT PROXIMAL FEMORAL PHASIC: NORMAL
BH CV LOWER VASCULAR LEFT PROXIMAL FEMORAL SPONT: NORMAL
BH CV LOWER VASCULAR LEFT SAPHENOFEMORAL JUNCTION AUGMENT: NORMAL
BH CV LOWER VASCULAR LEFT SAPHENOFEMORAL JUNCTION COMPRESS: NORMAL
BH CV LOWER VASCULAR LEFT SAPHENOFEMORAL JUNCTION SPONT: NORMAL
BH CV LOWER VASCULAR RIGHT COMMON FEMORAL AUGMENT: NORMAL
BH CV LOWER VASCULAR RIGHT COMMON FEMORAL COMPRESS: NORMAL
BH CV LOWER VASCULAR RIGHT COMMON FEMORAL PHASIC: NORMAL
BH CV LOWER VASCULAR RIGHT COMMON FEMORAL SPONT: NORMAL
BH CV LOWER VASCULAR RIGHT DISTAL FEMORAL AUGMENT: NORMAL
BH CV LOWER VASCULAR RIGHT DISTAL FEMORAL COMPRESS: NORMAL
BH CV LOWER VASCULAR RIGHT DISTAL FEMORAL PHASIC: NORMAL
BH CV LOWER VASCULAR RIGHT DISTAL FEMORAL SPONT: NORMAL
BH CV LOWER VASCULAR RIGHT GASTRONEMIUS COMPRESS: NORMAL
BH CV LOWER VASCULAR RIGHT GREATER SAPH AK COMPRESS: NORMAL
BH CV LOWER VASCULAR RIGHT GREATER SAPH BK COMPRESS: NORMAL
BH CV LOWER VASCULAR RIGHT LESSER SAPH COMPRESS: NORMAL
BH CV LOWER VASCULAR RIGHT MID FEMORAL AUGMENT: NORMAL
BH CV LOWER VASCULAR RIGHT MID FEMORAL COMPRESS: NORMAL
BH CV LOWER VASCULAR RIGHT MID FEMORAL PHASIC: NORMAL
BH CV LOWER VASCULAR RIGHT MID FEMORAL SPONT: NORMAL
BH CV LOWER VASCULAR RIGHT PERONEAL COMPRESS: NORMAL
BH CV LOWER VASCULAR RIGHT POPLITEAL AUGMENT: NORMAL
BH CV LOWER VASCULAR RIGHT POPLITEAL COMPRESS: NORMAL
BH CV LOWER VASCULAR RIGHT POPLITEAL PHASIC: NORMAL
BH CV LOWER VASCULAR RIGHT POPLITEAL SPONT: NORMAL
BH CV LOWER VASCULAR RIGHT POSTERIOR TIBIAL COMPRESS: NORMAL
BH CV LOWER VASCULAR RIGHT PROFUNDA FEMORAL AUGMENT: NORMAL
BH CV LOWER VASCULAR RIGHT PROFUNDA FEMORAL PHASIC: NORMAL
BH CV LOWER VASCULAR RIGHT PROFUNDA FEMORAL SPONT: NORMAL
BH CV LOWER VASCULAR RIGHT PROXIMAL FEMORAL AUGMENT: NORMAL
BH CV LOWER VASCULAR RIGHT PROXIMAL FEMORAL COMPRESS: NORMAL
BH CV LOWER VASCULAR RIGHT PROXIMAL FEMORAL PHASIC: NORMAL
BH CV LOWER VASCULAR RIGHT PROXIMAL FEMORAL SPONT: NORMAL
BH CV LOWER VASCULAR RIGHT SAPHENOFEMORAL JUNCTION AUGMENT: NORMAL
BH CV LOWER VASCULAR RIGHT SAPHENOFEMORAL JUNCTION COMPRESS: NORMAL
BH CV LOWER VASCULAR RIGHT SAPHENOFEMORAL JUNCTION PHASIC: NORMAL
BH CV LOWER VASCULAR RIGHT SAPHENOFEMORAL JUNCTION SPONT: NORMAL
BH CV VAS BP LEFT ARM: NORMAL MMHG
BH CV XLRA - RV BASE: 3.6 CM
BH CV XLRA - RV LENGTH: 6.4 CM
BH CV XLRA - RV MID: 3.1 CM
BH CV XLRA - TDI S': 10 CM/SEC
BILIRUB SERPL-MCNC: 0.4 MG/DL (ref 0–1.2)
BUN SERPL-MCNC: 17 MG/DL (ref 8–23)
BUN SERPL-MCNC: 19 MG/DL (ref 8–23)
BUN/CREAT SERPL: 20.7 (ref 7–25)
BUN/CREAT SERPL: 22.4 (ref 7–25)
CALCIUM SPEC-SCNC: 9 MG/DL (ref 8.6–10.5)
CALCIUM SPEC-SCNC: 9 MG/DL (ref 8.6–10.5)
CHLORIDE SERPL-SCNC: 98 MMOL/L (ref 98–107)
CHLORIDE SERPL-SCNC: 99 MMOL/L (ref 98–107)
CO2 SERPL-SCNC: 31 MMOL/L (ref 22–29)
CO2 SERPL-SCNC: 32 MMOL/L (ref 22–29)
CREAT BLDA-MCNC: 0.9 MG/DL
CREAT BLDA-MCNC: 0.9 MG/DL (ref 0.6–1.3)
CREAT SERPL-MCNC: 0.76 MG/DL (ref 0.57–1)
CREAT SERPL-MCNC: 0.92 MG/DL (ref 0.57–1)
CRP SERPL-MCNC: 8.99 MG/DL (ref 0–0.5)
D-LACTATE SERPL-SCNC: 0.9 MMOL/L (ref 0.5–2)
DEPRECATED RDW RBC AUTO: 44.2 FL (ref 37–54)
DEPRECATED RDW RBC AUTO: 45.1 FL (ref 37–54)
EGFRCR SERPLBLD CKD-EPI 2021: 68 ML/MIN/1.73
EGFRCR SERPLBLD CKD-EPI 2021: 85.5 ML/MIN/1.73
EOSINOPHIL # BLD AUTO: 0.01 10*3/MM3 (ref 0–0.4)
EOSINOPHIL # BLD AUTO: 0.03 10*3/MM3 (ref 0–0.4)
EOSINOPHIL NFR BLD AUTO: 0.1 % (ref 0.3–6.2)
EOSINOPHIL NFR BLD AUTO: 0.3 % (ref 0.3–6.2)
ERYTHROCYTE [DISTWIDTH] IN BLOOD BY AUTOMATED COUNT: 13.2 % (ref 12.3–15.4)
ERYTHROCYTE [DISTWIDTH] IN BLOOD BY AUTOMATED COUNT: 13.2 % (ref 12.3–15.4)
FENTANYL UR-MCNC: NEGATIVE NG/ML
GLOBULIN UR ELPH-MCNC: 3.7 GM/DL
GLUCOSE BLDC GLUCOMTR-MCNC: 169 MG/DL (ref 70–130)
GLUCOSE SERPL-MCNC: 100 MG/DL (ref 65–99)
GLUCOSE SERPL-MCNC: 94 MG/DL (ref 65–99)
HCT VFR BLD AUTO: 30.6 % (ref 34–46.6)
HCT VFR BLD AUTO: 33.4 % (ref 34–46.6)
HGB BLD-MCNC: 10.2 G/DL (ref 12–15.9)
HGB BLD-MCNC: 9.6 G/DL (ref 12–15.9)
IMM GRANULOCYTES # BLD AUTO: 0.03 10*3/MM3 (ref 0–0.05)
IMM GRANULOCYTES # BLD AUTO: 0.05 10*3/MM3 (ref 0–0.05)
IMM GRANULOCYTES NFR BLD AUTO: 0.3 % (ref 0–0.5)
IMM GRANULOCYTES NFR BLD AUTO: 0.4 % (ref 0–0.5)
IVRT: 112 MS
LDH SERPL-CCNC: 204 U/L (ref 135–214)
LEFT ATRIUM VOLUME INDEX: 39.8 ML/M2
LYMPHOCYTES # BLD AUTO: 1.33 10*3/MM3 (ref 0.7–3.1)
LYMPHOCYTES # BLD AUTO: 1.72 10*3/MM3 (ref 0.7–3.1)
LYMPHOCYTES NFR BLD AUTO: 13.8 % (ref 19.6–45.3)
LYMPHOCYTES NFR BLD AUTO: 14.4 % (ref 19.6–45.3)
MAGNESIUM SERPL-MCNC: 1.3 MG/DL (ref 1.6–2.4)
MCH RBC QN AUTO: 28.2 PG (ref 26.6–33)
MCH RBC QN AUTO: 29.2 PG (ref 26.6–33)
MCHC RBC AUTO-ENTMCNC: 30.5 G/DL (ref 31.5–35.7)
MCHC RBC AUTO-ENTMCNC: 31.4 G/DL (ref 31.5–35.7)
MCV RBC AUTO: 92.3 FL (ref 79–97)
MCV RBC AUTO: 93 FL (ref 79–97)
MONOCYTES # BLD AUTO: 0.34 10*3/MM3 (ref 0.1–0.9)
MONOCYTES # BLD AUTO: 0.4 10*3/MM3 (ref 0.1–0.9)
MONOCYTES NFR BLD AUTO: 3.2 % (ref 5–12)
MONOCYTES NFR BLD AUTO: 3.7 % (ref 5–12)
NEUTROPHILS NFR BLD AUTO: 10.28 10*3/MM3 (ref 1.7–7)
NEUTROPHILS NFR BLD AUTO: 7.49 10*3/MM3 (ref 1.7–7)
NEUTROPHILS NFR BLD AUTO: 81.1 % (ref 42.7–76)
NEUTROPHILS NFR BLD AUTO: 82.3 % (ref 42.7–76)
NRBC BLD AUTO-RTO: 0 /100 WBC (ref 0–0.2)
NRBC BLD AUTO-RTO: 0 /100 WBC (ref 0–0.2)
NT-PROBNP SERPL-MCNC: 537 PG/ML (ref 0–900)
PLATELET # BLD AUTO: 240 10*3/MM3 (ref 140–450)
PLATELET # BLD AUTO: 263 10*3/MM3 (ref 140–450)
PMV BLD AUTO: 10.9 FL (ref 6–12)
PMV BLD AUTO: 11 FL (ref 6–12)
POTASSIUM SERPL-SCNC: 3.5 MMOL/L (ref 3.5–5.2)
POTASSIUM SERPL-SCNC: 3.8 MMOL/L (ref 3.5–5.2)
POTASSIUM SERPL-SCNC: 4.5 MMOL/L (ref 3.5–5.2)
PROCALCITONIN SERPL-MCNC: 1.21 NG/ML (ref 0–0.25)
PROT SERPL-MCNC: 7.2 G/DL (ref 6–8.5)
QT INTERVAL: 420 MS
QTC INTERVAL: 405 MS
RBC # BLD AUTO: 3.29 10*6/MM3 (ref 3.77–5.28)
RBC # BLD AUTO: 3.62 10*6/MM3 (ref 3.77–5.28)
SODIUM SERPL-SCNC: 137 MMOL/L (ref 136–145)
SODIUM SERPL-SCNC: 138 MMOL/L (ref 136–145)
TROPONIN T SERPL HS-MCNC: 22 NG/L
TROPONIN T SERPL HS-MCNC: 25 NG/L
WBC NRBC COR # BLD: 12.48 10*3/MM3 (ref 3.4–10.8)
WBC NRBC COR # BLD: 9.24 10*3/MM3 (ref 3.4–10.8)

## 2023-10-26 PROCEDURE — G0378 HOSPITAL OBSERVATION PER HR: HCPCS

## 2023-10-26 PROCEDURE — 25010000002 CEFTRIAXONE PER 250 MG: Performed by: PHYSICIAN ASSISTANT

## 2023-10-26 PROCEDURE — 25010000002 FUROSEMIDE PER 20 MG: Performed by: STUDENT IN AN ORGANIZED HEALTH CARE EDUCATION/TRAINING PROGRAM

## 2023-10-26 PROCEDURE — 96366 THER/PROPH/DIAG IV INF ADDON: CPT

## 2023-10-26 PROCEDURE — 96375 TX/PRO/DX INJ NEW DRUG ADDON: CPT

## 2023-10-26 PROCEDURE — 93970 EXTREMITY STUDY: CPT | Performed by: INTERNAL MEDICINE

## 2023-10-26 PROCEDURE — 85025 COMPLETE CBC W/AUTO DIFF WBC: CPT | Performed by: PHYSICIAN ASSISTANT

## 2023-10-26 PROCEDURE — 97602 WOUND(S) CARE NON-SELECTIVE: CPT

## 2023-10-26 PROCEDURE — 87040 BLOOD CULTURE FOR BACTERIA: CPT | Performed by: PHYSICIAN ASSISTANT

## 2023-10-26 PROCEDURE — 25010000002 ONDANSETRON PER 1 MG: Performed by: INTERNAL MEDICINE

## 2023-10-26 PROCEDURE — 86140 C-REACTIVE PROTEIN: CPT | Performed by: PHYSICIAN ASSISTANT

## 2023-10-26 PROCEDURE — 36415 COLL VENOUS BLD VENIPUNCTURE: CPT

## 2023-10-26 PROCEDURE — 71275 CT ANGIOGRAPHY CHEST: CPT

## 2023-10-26 PROCEDURE — 83615 LACTATE (LD) (LDH) ENZYME: CPT | Performed by: PHYSICIAN ASSISTANT

## 2023-10-26 PROCEDURE — 74177 CT ABD & PELVIS W/CONTRAST: CPT

## 2023-10-26 PROCEDURE — 84132 ASSAY OF SERUM POTASSIUM: CPT | Performed by: INTERNAL MEDICINE

## 2023-10-26 PROCEDURE — 84484 ASSAY OF TROPONIN QUANT: CPT | Performed by: PHYSICIAN ASSISTANT

## 2023-10-26 PROCEDURE — 25010000002 CEFTRIAXONE PER 250 MG: Performed by: STUDENT IN AN ORGANIZED HEALTH CARE EDUCATION/TRAINING PROGRAM

## 2023-10-26 PROCEDURE — 96365 THER/PROPH/DIAG IV INF INIT: CPT

## 2023-10-26 PROCEDURE — 93306 TTE W/DOPPLER COMPLETE: CPT

## 2023-10-26 PROCEDURE — 80053 COMPREHEN METABOLIC PANEL: CPT | Performed by: PHYSICIAN ASSISTANT

## 2023-10-26 PROCEDURE — 83605 ASSAY OF LACTIC ACID: CPT | Performed by: PHYSICIAN ASSISTANT

## 2023-10-26 PROCEDURE — 83880 ASSAY OF NATRIURETIC PEPTIDE: CPT | Performed by: PHYSICIAN ASSISTANT

## 2023-10-26 PROCEDURE — 85025 COMPLETE CBC W/AUTO DIFF WBC: CPT | Performed by: STUDENT IN AN ORGANIZED HEALTH CARE EDUCATION/TRAINING PROGRAM

## 2023-10-26 PROCEDURE — 83735 ASSAY OF MAGNESIUM: CPT | Performed by: STUDENT IN AN ORGANIZED HEALTH CARE EDUCATION/TRAINING PROGRAM

## 2023-10-26 PROCEDURE — 84145 PROCALCITONIN (PCT): CPT | Performed by: PHYSICIAN ASSISTANT

## 2023-10-26 PROCEDURE — 25010000002 MAGNESIUM SULFATE 2 GM/50ML SOLUTION: Performed by: INTERNAL MEDICINE

## 2023-10-26 PROCEDURE — 82948 REAGENT STRIP/BLOOD GLUCOSE: CPT

## 2023-10-26 PROCEDURE — 25510000001 IOPAMIDOL PER 1 ML: Performed by: EMERGENCY MEDICINE

## 2023-10-26 PROCEDURE — 96367 TX/PROPH/DG ADDL SEQ IV INF: CPT

## 2023-10-26 PROCEDURE — 82565 ASSAY OF CREATININE: CPT

## 2023-10-26 PROCEDURE — 93970 EXTREMITY STUDY: CPT

## 2023-10-26 RX ORDER — DULOXETIN HYDROCHLORIDE 30 MG/1
30 CAPSULE, DELAYED RELEASE ORAL DAILY
Status: CANCELLED | OUTPATIENT
Start: 2023-10-26

## 2023-10-26 RX ORDER — PRAMIPEXOLE DIHYDROCHLORIDE 0.25 MG/1
0.12 TABLET ORAL EVERY 12 HOURS SCHEDULED
Status: DISCONTINUED | OUTPATIENT
Start: 2023-10-26 | End: 2023-10-28 | Stop reason: HOSPADM

## 2023-10-26 RX ORDER — ROSUVASTATIN CALCIUM 20 MG/1
40 TABLET, COATED ORAL DAILY
Status: DISCONTINUED | OUTPATIENT
Start: 2023-10-26 | End: 2023-10-28 | Stop reason: HOSPADM

## 2023-10-26 RX ORDER — OXYCODONE HYDROCHLORIDE 5 MG/1
5 TABLET ORAL ONCE
Status: COMPLETED | OUTPATIENT
Start: 2023-10-26 | End: 2023-10-26

## 2023-10-26 RX ORDER — AMLODIPINE BESYLATE 5 MG/1
1 TABLET ORAL DAILY
COMMUNITY
Start: 2023-11-04

## 2023-10-26 RX ORDER — MAGNESIUM SULFATE HEPTAHYDRATE 40 MG/ML
2 INJECTION, SOLUTION INTRAVENOUS
Qty: 150 ML | Refills: 0 | Status: COMPLETED | OUTPATIENT
Start: 2023-10-26 | End: 2023-10-26

## 2023-10-26 RX ORDER — GABAPENTIN 400 MG/1
400 CAPSULE ORAL 3 TIMES DAILY
Status: DISCONTINUED | OUTPATIENT
Start: 2023-10-26 | End: 2023-10-28 | Stop reason: HOSPADM

## 2023-10-26 RX ORDER — POTASSIUM CHLORIDE 20 MEQ/1
40 TABLET, EXTENDED RELEASE ORAL EVERY 4 HOURS
Qty: 4 TABLET | Refills: 0 | Status: COMPLETED | OUTPATIENT
Start: 2023-10-26 | End: 2023-10-26

## 2023-10-26 RX ORDER — AMLODIPINE BESYLATE 5 MG/1
5 TABLET ORAL
Status: DISCONTINUED | OUTPATIENT
Start: 2023-10-26 | End: 2023-10-28 | Stop reason: HOSPADM

## 2023-10-26 RX ORDER — GABAPENTIN 400 MG/1
1 CAPSULE ORAL 3 TIMES DAILY
COMMUNITY
Start: 2023-11-04

## 2023-10-26 RX ORDER — PIOGLITAZONEHYDROCHLORIDE 30 MG/1
30 TABLET ORAL DAILY
COMMUNITY
End: 2023-10-28 | Stop reason: HOSPADM

## 2023-10-26 RX ORDER — POLYETHYLENE GLYCOL 3350 17 G/17G
17 POWDER, FOR SOLUTION ORAL DAILY PRN
Status: DISCONTINUED | OUTPATIENT
Start: 2023-10-26 | End: 2023-10-28 | Stop reason: HOSPADM

## 2023-10-26 RX ORDER — TRAZODONE HYDROCHLORIDE 100 MG/1
100 TABLET ORAL NIGHTLY
Status: DISCONTINUED | OUTPATIENT
Start: 2023-10-26 | End: 2023-10-28 | Stop reason: HOSPADM

## 2023-10-26 RX ORDER — TIZANIDINE 4 MG/1
4 TABLET ORAL EVERY 8 HOURS PRN
Status: DISCONTINUED | OUTPATIENT
Start: 2023-10-26 | End: 2023-10-28 | Stop reason: HOSPADM

## 2023-10-26 RX ORDER — ACETAMINOPHEN 325 MG/1
650 TABLET ORAL EVERY 4 HOURS PRN
Status: DISCONTINUED | OUTPATIENT
Start: 2023-10-26 | End: 2023-10-28 | Stop reason: HOSPADM

## 2023-10-26 RX ORDER — SODIUM CHLORIDE 9 MG/ML
40 INJECTION, SOLUTION INTRAVENOUS AS NEEDED
Status: DISCONTINUED | OUTPATIENT
Start: 2023-10-26 | End: 2023-10-28 | Stop reason: HOSPADM

## 2023-10-26 RX ORDER — ALBUTEROL SULFATE 2.5 MG/3ML
2.5 SOLUTION RESPIRATORY (INHALATION) EVERY 4 HOURS PRN
Status: DISCONTINUED | OUTPATIENT
Start: 2023-10-26 | End: 2023-10-28 | Stop reason: HOSPADM

## 2023-10-26 RX ORDER — SODIUM CHLORIDE 0.9 % (FLUSH) 0.9 %
10 SYRINGE (ML) INJECTION AS NEEDED
Status: DISCONTINUED | OUTPATIENT
Start: 2023-10-26 | End: 2023-10-28 | Stop reason: HOSPADM

## 2023-10-26 RX ORDER — AMOXICILLIN 250 MG
2 CAPSULE ORAL 2 TIMES DAILY
Status: DISCONTINUED | OUTPATIENT
Start: 2023-10-26 | End: 2023-10-28 | Stop reason: HOSPADM

## 2023-10-26 RX ORDER — ASPIRIN 81 MG/1
81 TABLET, CHEWABLE ORAL DAILY
Status: DISCONTINUED | OUTPATIENT
Start: 2023-10-26 | End: 2023-10-28 | Stop reason: HOSPADM

## 2023-10-26 RX ORDER — ONDANSETRON 2 MG/ML
4 INJECTION INTRAMUSCULAR; INTRAVENOUS EVERY 6 HOURS PRN
Status: DISCONTINUED | OUTPATIENT
Start: 2023-10-26 | End: 2023-10-28 | Stop reason: HOSPADM

## 2023-10-26 RX ORDER — SODIUM CHLORIDE 0.9 % (FLUSH) 0.9 %
10 SYRINGE (ML) INJECTION EVERY 12 HOURS SCHEDULED
Status: DISCONTINUED | OUTPATIENT
Start: 2023-10-26 | End: 2023-10-28 | Stop reason: HOSPADM

## 2023-10-26 RX ORDER — BISACODYL 5 MG/1
5 TABLET, DELAYED RELEASE ORAL DAILY PRN
Status: DISCONTINUED | OUTPATIENT
Start: 2023-10-26 | End: 2023-10-28 | Stop reason: HOSPADM

## 2023-10-26 RX ORDER — SERTRALINE HYDROCHLORIDE 100 MG/1
2 TABLET, FILM COATED ORAL DAILY
COMMUNITY
Start: 2023-11-04

## 2023-10-26 RX ORDER — BUSPIRONE HYDROCHLORIDE 10 MG/1
10 TABLET ORAL DAILY
Status: DISCONTINUED | OUTPATIENT
Start: 2023-10-26 | End: 2023-10-28 | Stop reason: HOSPADM

## 2023-10-26 RX ORDER — BISACODYL 10 MG
10 SUPPOSITORY, RECTAL RECTAL DAILY PRN
Status: DISCONTINUED | OUTPATIENT
Start: 2023-10-26 | End: 2023-10-28 | Stop reason: HOSPADM

## 2023-10-26 RX ORDER — FUROSEMIDE 10 MG/ML
20 INJECTION INTRAMUSCULAR; INTRAVENOUS ONCE
Status: COMPLETED | OUTPATIENT
Start: 2023-10-26 | End: 2023-10-26

## 2023-10-26 RX ORDER — SERTRALINE HYDROCHLORIDE 100 MG/1
100 TABLET, FILM COATED ORAL DAILY
Status: DISCONTINUED | OUTPATIENT
Start: 2023-10-26 | End: 2023-10-28 | Stop reason: HOSPADM

## 2023-10-26 RX ORDER — PRAVASTATIN SODIUM 20 MG
20 TABLET ORAL DAILY
COMMUNITY
End: 2023-10-28 | Stop reason: HOSPADM

## 2023-10-26 RX ORDER — TRAZODONE HYDROCHLORIDE 100 MG/1
2 TABLET ORAL NIGHTLY
COMMUNITY
Start: 2023-11-04

## 2023-10-26 RX ORDER — GABAPENTIN 800 MG/1
800 TABLET ORAL NIGHTLY
COMMUNITY
End: 2023-10-28 | Stop reason: HOSPADM

## 2023-10-26 RX ADMIN — IOPAMIDOL 75 ML: 755 INJECTION, SOLUTION INTRAVENOUS at 01:02

## 2023-10-26 RX ADMIN — SERTRALINE HYDROCHLORIDE 100 MG: 100 TABLET ORAL at 10:07

## 2023-10-26 RX ADMIN — POTASSIUM CHLORIDE 40 MEQ: 1500 TABLET, EXTENDED RELEASE ORAL at 14:18

## 2023-10-26 RX ADMIN — SODIUM CHLORIDE 1000 MG: 900 INJECTION INTRAVENOUS at 02:33

## 2023-10-26 RX ADMIN — TRAZODONE HYDROCHLORIDE 100 MG: 100 TABLET ORAL at 21:12

## 2023-10-26 RX ADMIN — OXYCODONE HYDROCHLORIDE 5 MG: 5 TABLET ORAL at 00:33

## 2023-10-26 RX ADMIN — MAGNESIUM SULFATE HEPTAHYDRATE 2 G: 40 INJECTION, SOLUTION INTRAVENOUS at 14:18

## 2023-10-26 RX ADMIN — GABAPENTIN 400 MG: 400 CAPSULE ORAL at 20:40

## 2023-10-26 RX ADMIN — SODIUM CHLORIDE 1000 MG: 900 INJECTION INTRAVENOUS at 20:37

## 2023-10-26 RX ADMIN — BUSPIRONE HYDROCHLORIDE 10 MG: 10 TABLET ORAL at 10:07

## 2023-10-26 RX ADMIN — ONDANSETRON 4 MG: 2 INJECTION INTRAMUSCULAR; INTRAVENOUS at 11:12

## 2023-10-26 RX ADMIN — POTASSIUM CHLORIDE 40 MEQ: 1500 TABLET, EXTENDED RELEASE ORAL at 10:07

## 2023-10-26 RX ADMIN — Medication 10 ML: at 10:08

## 2023-10-26 RX ADMIN — PRAMIPEXOLE DIHYDROCHLORIDE 0.12 MG: 0.25 TABLET ORAL at 20:40

## 2023-10-26 RX ADMIN — ASPIRIN 81 MG CHEWABLE TABLET 81 MG: 81 TABLET CHEWABLE at 10:07

## 2023-10-26 RX ADMIN — Medication 10 ML: at 21:14

## 2023-10-26 RX ADMIN — GABAPENTIN 400 MG: 400 CAPSULE ORAL at 10:07

## 2023-10-26 RX ADMIN — AMLODIPINE BESYLATE 5 MG: 5 TABLET ORAL at 10:07

## 2023-10-26 RX ADMIN — MAGNESIUM SULFATE HEPTAHYDRATE 2 G: 40 INJECTION, SOLUTION INTRAVENOUS at 12:03

## 2023-10-26 RX ADMIN — GABAPENTIN 400 MG: 400 CAPSULE ORAL at 15:25

## 2023-10-26 RX ADMIN — DOXYCYCLINE 100 MG: 100 INJECTION, POWDER, LYOPHILIZED, FOR SOLUTION INTRAVENOUS at 21:12

## 2023-10-26 RX ADMIN — PRAMIPEXOLE DIHYDROCHLORIDE 0.12 MG: 0.25 TABLET ORAL at 10:06

## 2023-10-26 RX ADMIN — APIXABAN 2.5 MG: 2.5 TABLET, FILM COATED ORAL at 20:40

## 2023-10-26 RX ADMIN — DOXYCYCLINE 100 MG: 100 INJECTION, POWDER, LYOPHILIZED, FOR SOLUTION INTRAVENOUS at 03:48

## 2023-10-26 RX ADMIN — METOPROLOL TARTRATE 25 MG: 25 TABLET, FILM COATED ORAL at 10:07

## 2023-10-26 RX ADMIN — SENNOSIDES AND DOCUSATE SODIUM 2 TABLET: 8.6; 5 TABLET ORAL at 20:40

## 2023-10-26 RX ADMIN — FUROSEMIDE 20 MG: 10 INJECTION, SOLUTION INTRAMUSCULAR; INTRAVENOUS at 03:48

## 2023-10-26 RX ADMIN — APIXABAN 2.5 MG: 2.5 TABLET, FILM COATED ORAL at 10:07

## 2023-10-26 RX ADMIN — ROSUVASTATIN 40 MG: 20 TABLET, FILM COATED ORAL at 10:07

## 2023-10-26 RX ADMIN — MAGNESIUM SULFATE HEPTAHYDRATE 2 G: 40 INJECTION, SOLUTION INTRAVENOUS at 10:06
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
## 2023-10-26 NOTE — CASE MANAGEMENT/SOCIAL WORK
Discharge Planning Assessment  [facility]     Patient Name: Sheree Samuel  MRN: [id number]  Today's Date: 10/26/2023    Admit Date: 10/25/2023    Plan: IDP; home vs home with home health   Discharge Needs Assessment       Row Name 10/26/23 1441       Discharge Needs Assessment    Equipment Currently Used at Home wheelchair, motorized      Row Name 10/26/23 1432       Living Environment    People in Home spouse;child(barbara), adult    Name(s) of People in Home Alessio Samuel (Power of )  [phone] (Mobile) AntonApril Daughter   [phone]    Current Living Arrangements home    Potentially Unsafe Housing Conditions none    In the past 12 months has the electric, gas, oil, or water company threatened to shut off services in your home? No    Primary Care Provided by spouse/significant other    Provides Primary Care For no one, unable/limited ability to care for self    Family Caregiver if Needed spouse;child(barbara), adult    Family Caregiver Names Alessio Samuel (Power of ) [phone] (Mobile) Wood,April Daughter [phone]    Quality of Family Relationships helpful;involved    Able to Return to Prior Arrangements yes       Resource/Environmental Concerns    Resource/Environmental Concerns none    Transportation Concerns none       Food Insecurity    Within the past 12 months, you worried that your food would run out before you got the money to buy more. Never true    Within the past 12 months, the food you bought just didn't last and you didn't have money to get more. Never true       Transition Planning    Patient/Family Anticipates Transition to home with family;home with help/services    Patient/Family Anticipated Services at Transition home health care    Transportation Anticipated family or friend will provide       Discharge Needs Assessment    Readmission Within the Last 30 Days no previous admission in last 30 days    Equipment Currently Used at Home cane, straight;walker, standard    Concerns  to be Addressed discharge planning    Anticipated Changes Related to Illness inability to care for self    Current Discharge Risk chronically ill;dependent with mobility/activities of daily living                   Discharge Plan       Row Name 10/26/23 3894       Plan    Plan IDP; home vs home with home health    Patient/Family in Agreement with Plan yes    Plan Comments CM met with the patient and her  at the bedside to discuss discharge planning. Patient stated that she lives in [place] in a house with her  and daughter. They are in the process of building onto the house to have their own living space. Mrs Samuel stated that she uses a cane to ambulate all the time and has a walker at home when needed. She also has a scooter for longer distances. Patient stated that she has a CPAP machine at home but does not use it. She does not use of have O2 at home but is on 2L O2 here with 100% saturation on the monitor. She stated that she is mostly independent at home but needs assistance with driving as she has not driven in 4 months. She confirmed her insurance is [employer] and her PCP is Dr. Worrell. She is scheduled to see Dr. Worrell in November but CM will assist with seeing if we can move that appointment closer due to need for hospital follow up. Patient mentioned that she has a [employer] pain pump that has been in place for many years. She is looking for a MD that can assist with refilling her medication for that pump. CM to discuss with hospitalist tomorrow at rounds. CM to follow and assist as needed.    Final Discharge Disposition Code 30 - still a patient                  Continued Care and Services - Admitted Since 10/25/2023    Coordination has not been started for this encounter.       Expected Discharge Date and Time       Expected Discharge Date Expected Discharge Time    Oct 27, 2023            Demographic Summary    No documentation.                  Functional Status       Row  Name 10/26/23 1431       Functional Status    Usual Activity Tolerance moderate    Current Activity Tolerance fair       Physical Activity    On average, how many days per week do you engage in moderate to strenuous exercise (like a brisk walk)? 0 days    On average, how many minutes do you engage in exercise at this level? 0 min    Number of minutes of exercise per week 0       Assessment of Health Literacy    How often do you have someone help you read hospital materials? Occasionally    How often do you have problems learning about your medical condition because of difficulty understanding written information? Occasionally    How often do you have a problem understanding what is told to you about your medical condition? Occasionally    How confident are you filling out medical forms by yourself? Quite a bit    Health Literacy Good       Functional Status, IADL    Medications independent    Meal Preparation assistive equipment    Laundry assistive person    Shopping assistive equipment and person       Mental Status    General Appearance WDL WDL       Mental Status Summary    Recent Changes in Mental Status/Cognitive Functioning no changes       Employment/    Employment Status retired    Employment/ Comments Retired from post office                   Psychosocial    No documentation.                  Abuse/Neglect    No documentation.                  Legal    No documentation.                  Substance Abuse    No documentation.                  Patient Forms    No documentation.                     Africa Samuels RN

## 2023-10-26 NOTE — NURSING NOTE
"                             Wound, Ostomy and Continence (WOC) Note    Reason for WOC Consultation: Lower extremity wounds    Patient awake.  Family/support person at bedside.   Subjective: Patient states,\" I wear compression socks occasionally.\"    Skin/Wound Assessment:     Wound Type: Venous Ulcer, venous stasis noted at bilateral lower extremities.  Location: Left lower extremity, gaiter region  Wound Bed: dry, slough, yellow, and fibrin?  Wound Edges: Jagged and Rolled/Closed  Periwound Skin: intact and dry   Drainage Characteristics/Odor: none  Drainage Amount: none  Pain: Yes   Care provided: The wound was cleansed with barrier wipes.  Thera honey sheet applied to the length of the wound and cover with Optifoam dressing.  Image:     Summary and Recommendation(s):     - Refer to wound care instructions in the \"Orders\" tab  -Recommend patient follow-up with a podiatrist regarding her toenails discharge.  - Specialty support surface in place: Foam Mattress with Waffle Overlay         Pressure Injury Prevention Measures (initiate for a Darvin Scale Score of <18):     Most recent Darvin Scale score:  Sensory Perception: 4-->no impairment  Moisture: 3-->occasionally moist  Activity: 3-->walks occasionally  Mobility: 3-->slightly limited  Nutrition: 3-->adequate  Friction and Shear: 2-->potential problem  Darvin Score: 18 (10/26/23 0830)     -Turn q 2 hr. using an offloading foam wedge, keep heels elevated and offloaded with offloading heel boots.    -Raise knee-gatch before elevating HOB to reduce shearing   -Follow C.A.R.E protocol if medical devices (Bipap, terry, Ng tube, etc) are being used.  -Apply moisture barrier cream to bottom BID & PRN, if incontinent.  -Clean skin gently with no-rinse PH-balanced foam cleanser and a soft, disposable cloth (barrier wipes-blue pack).   -Reduce layers under patient (one sheet as drawsheet and two incontinence pads)    Thank you for consulting the WOC Nurse.  WOC Team will " sign off.  Please re consult if the wound(s) worsens.     Sean Antoine RN, BSN, CCRN, CWOCN  Wound, Ostomy and Continence (WOC) Department  [facility]
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
## 2023-10-27 LAB
ANION GAP SERPL CALCULATED.3IONS-SCNC: 5 MMOL/L (ref 5–15)
BASOPHILS # BLD AUTO: 0.01 10*3/MM3 (ref 0–0.2)
BASOPHILS NFR BLD AUTO: 0.1 % (ref 0–1.5)
BUN SERPL-MCNC: 18 MG/DL (ref 8–23)
BUN/CREAT SERPL: 23.4 (ref 7–25)
CALCIUM SPEC-SCNC: 8.6 MG/DL (ref 8.6–10.5)
CHLORIDE SERPL-SCNC: 104 MMOL/L (ref 98–107)
CO2 SERPL-SCNC: 30 MMOL/L (ref 22–29)
CREAT SERPL-MCNC: 0.77 MG/DL (ref 0.57–1)
DEPRECATED RDW RBC AUTO: 45.6 FL (ref 37–54)
EGFRCR SERPLBLD CKD-EPI 2021: 84.1 ML/MIN/1.73
EOSINOPHIL # BLD AUTO: 0.12 10*3/MM3 (ref 0–0.4)
EOSINOPHIL NFR BLD AUTO: 1.8 % (ref 0.3–6.2)
ERYTHROCYTE [DISTWIDTH] IN BLOOD BY AUTOMATED COUNT: 13.3 % (ref 12.3–15.4)
GLUCOSE SERPL-MCNC: 95 MG/DL (ref 65–99)
HCT VFR BLD AUTO: 30.6 % (ref 34–46.6)
HGB BLD-MCNC: 9.3 G/DL (ref 12–15.9)
IMM GRANULOCYTES # BLD AUTO: 0.03 10*3/MM3 (ref 0–0.05)
IMM GRANULOCYTES NFR BLD AUTO: 0.4 % (ref 0–0.5)
LYMPHOCYTES # BLD AUTO: 1.58 10*3/MM3 (ref 0.7–3.1)
LYMPHOCYTES NFR BLD AUTO: 23.4 % (ref 19.6–45.3)
MAGNESIUM SERPL-MCNC: 2.7 MG/DL (ref 1.6–2.4)
MCH RBC QN AUTO: 28.2 PG (ref 26.6–33)
MCHC RBC AUTO-ENTMCNC: 30.4 G/DL (ref 31.5–35.7)
MCV RBC AUTO: 92.7 FL (ref 79–97)
MONOCYTES # BLD AUTO: 0.44 10*3/MM3 (ref 0.1–0.9)
MONOCYTES NFR BLD AUTO: 6.5 % (ref 5–12)
NEUTROPHILS NFR BLD AUTO: 4.56 10*3/MM3 (ref 1.7–7)
NEUTROPHILS NFR BLD AUTO: 67.8 % (ref 42.7–76)
NRBC BLD AUTO-RTO: 0 /100 WBC (ref 0–0.2)
PLATELET # BLD AUTO: 250 10*3/MM3 (ref 140–450)
PMV BLD AUTO: 11.3 FL (ref 6–12)
POTASSIUM SERPL-SCNC: 4.5 MMOL/L (ref 3.5–5.2)
RBC # BLD AUTO: 3.3 10*6/MM3 (ref 3.77–5.28)
SODIUM SERPL-SCNC: 139 MMOL/L (ref 136–145)
WBC NRBC COR # BLD: 6.74 10*3/MM3 (ref 3.4–10.8)

## 2023-10-27 PROCEDURE — 97161 PT EVAL LOW COMPLEX 20 MIN: CPT

## 2023-10-27 PROCEDURE — 25010000002 CEFTRIAXONE PER 250 MG: Performed by: STUDENT IN AN ORGANIZED HEALTH CARE EDUCATION/TRAINING PROGRAM

## 2023-10-27 PROCEDURE — 80048 BASIC METABOLIC PNL TOTAL CA: CPT | Performed by: STUDENT IN AN ORGANIZED HEALTH CARE EDUCATION/TRAINING PROGRAM

## 2023-10-27 PROCEDURE — 96366 THER/PROPH/DIAG IV INF ADDON: CPT

## 2023-10-27 PROCEDURE — 83735 ASSAY OF MAGNESIUM: CPT | Performed by: STUDENT IN AN ORGANIZED HEALTH CARE EDUCATION/TRAINING PROGRAM

## 2023-10-27 PROCEDURE — G0378 HOSPITAL OBSERVATION PER HR: HCPCS

## 2023-10-27 PROCEDURE — 97530 THERAPEUTIC ACTIVITIES: CPT

## 2023-10-27 PROCEDURE — 85025 COMPLETE CBC W/AUTO DIFF WBC: CPT | Performed by: INTERNAL MEDICINE

## 2023-10-27 PROCEDURE — 97602 WOUND(S) CARE NON-SELECTIVE: CPT

## 2023-10-27 RX ADMIN — TRAZODONE HYDROCHLORIDE 100 MG: 100 TABLET ORAL at 21:17

## 2023-10-27 RX ADMIN — GABAPENTIN 400 MG: 400 CAPSULE ORAL at 08:43

## 2023-10-27 RX ADMIN — GABAPENTIN 400 MG: 400 CAPSULE ORAL at 21:17

## 2023-10-27 RX ADMIN — ASPIRIN 81 MG CHEWABLE TABLET 81 MG: 81 TABLET CHEWABLE at 08:44

## 2023-10-27 RX ADMIN — APIXABAN 2.5 MG: 2.5 TABLET, FILM COATED ORAL at 08:43

## 2023-10-27 RX ADMIN — SERTRALINE HYDROCHLORIDE 100 MG: 100 TABLET ORAL at 08:43

## 2023-10-27 RX ADMIN — DOXYCYCLINE 100 MG: 100 INJECTION, POWDER, LYOPHILIZED, FOR SOLUTION INTRAVENOUS at 22:27

## 2023-10-27 RX ADMIN — GABAPENTIN 400 MG: 400 CAPSULE ORAL at 18:27

## 2023-10-27 RX ADMIN — PRAMIPEXOLE DIHYDROCHLORIDE 0.12 MG: 0.25 TABLET ORAL at 21:17

## 2023-10-27 RX ADMIN — BUSPIRONE HYDROCHLORIDE 10 MG: 10 TABLET ORAL at 08:43

## 2023-10-27 RX ADMIN — SODIUM CHLORIDE 1000 MG: 900 INJECTION INTRAVENOUS at 21:17

## 2023-10-27 RX ADMIN — PRAMIPEXOLE DIHYDROCHLORIDE 0.12 MG: 0.25 TABLET ORAL at 08:43

## 2023-10-27 RX ADMIN — ROSUVASTATIN 40 MG: 20 TABLET, FILM COATED ORAL at 08:43

## 2023-10-27 RX ADMIN — DOXYCYCLINE 100 MG: 100 INJECTION, POWDER, LYOPHILIZED, FOR SOLUTION INTRAVENOUS at 08:42

## 2023-10-27 RX ADMIN — SENNOSIDES AND DOCUSATE SODIUM 2 TABLET: 8.6; 5 TABLET ORAL at 08:49

## 2023-10-27 RX ADMIN — Medication: at 08:42

## 2023-10-27 RX ADMIN — APIXABAN 2.5 MG: 2.5 TABLET, FILM COATED ORAL at 21:17

## 2023-10-27 RX ADMIN — Medication 10 ML: at 22:27

## 2023-10-27 NOTE — THERAPY EVALUATION
Patient Name: Sheree Samuel  : 1955    MRN: [id number]                              Today's Date: 10/27/2023       Admit Date: 10/25/2023    Visit Dx:     ICD-10-CM ICD-9-CM   1. Pneumonia of both lower lobes due to infectious organism  J18.9 486   2. Hypoxia  R09.02 799.02   3. Shortness of breath  R06.02 786.05   4. Pedal edema  R60.0 782.3   5. History of CHF (congestive heart failure)  Z86.79 V12.59   6. History of coronary artery disease  Z86.79 V12.59   7. History of hypertension  Z86.79 V12.59   8. History of hyperlipidemia  Z86.39 V12.29   9. History of COPD  Z87.09 V12.69   10. Acute UTI  N39.0 599.0   11. Elevated procalcitonin  R79.89 790.99   12. History of diabetes mellitus  Z86.39 V12.29   13. History of pulmonary embolism  Z86.711 V12.55   14. 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, unspecified joint    Chronic pulmonary embolism    Essential (primary) hypertension    Primary hypercoagulable state    Presence of unspecified artificial knee joint    Other specified abnormal findings of blood chemistry    Major depressive disorder, single episode, unspecified    Major depressive disorder, recurrent severe without psychotic features    Low back pain    Fall    Urinary tract infection, site not specified    SDH (subdural hematoma)     Past Medical History:   Diagnosis Date    Anxiety     Arthritis     ASHD (arteriosclerotic heart disease)     Cancer     Cervical disc disorder of mid-cervical region     Chronic pain     Depression     Depression     Diabetes mellitus     Heart  disease     Hyperlipidemia     Hypertension     MRSA carrier     Myocardial infarction     Pulmonary embolism      Past Surgical History:   Procedure Laterality Date    BREAST EXCISIONAL BIOPSY Right     x 2    CARDIAC CATHETERIZATION N/A 8/22/2016    Procedure: Left Heart Cath with +/- CBI;  Surgeon: Bernard Palumbo MD;  Location:  [facility];  Service:     CARDIAC CATHETERIZATION Bilateral 7/12/2018    Procedure: Right and Left Heart Cath;  Surgeon: Seth Farnsworth MD;  Location:  [facility];  Service: Cardiovascular    CORONARY ARTERY BYPASS GRAFT      CORONARY ARTERY BYPASS GRAFT      HYSTERECTOMY      OOPHORECTOMY      PAIN PUMP INSERTION/REVISION      PAIN PUMP INSERTION/REVISION      REDUCTION MAMMAPLASTY      early 80's      General Information       Row Name 10/27/23 1051          Physical Therapy Time and Intention    Document Type evaluation  -     Mode of Treatment physical therapy  -       Row Name 10/27/23 1051          General Information    Patient Profile Reviewed yes  -     Prior Level of Function independent:;all household mobility;community mobility;gait;transfer;bed mobility;min assist:;dressing;bathing;dependent:;driving  Uses SPC at baseline. Also has FWW, electric scooter, and WC for further distances  -     Existing Precautions/Restrictions fall;oxygen therapy device and L/min;other (see comments)  brief w/ OOB mobility  -     Barriers to Rehab medically complex;previous functional deficit  -       Row Name 10/27/23 1051          Living Environment    People in Home spouse;child(barbara), adult  -       Row Name 10/27/23 1051          Home Main Entrance    Number of Stairs, Main Entrance none  -       Row Name 10/27/23 1051          Stairs Within Home, Primary    Number of Stairs, Within Home, Primary none  -       Row Name 10/27/23 1051          Cognition    Orientation Status (Cognition) oriented x 3  -       Row Name  10/27/23 1051          Safety Issues, Functional Mobility    Safety Issues Affecting Function (Mobility) safety precaution awareness;sequencing abilities;insight into deficits/self-awareness  -     Impairments Affecting Function (Mobility) balance;endurance/activity tolerance;pain;sensation/sensory awareness;strength  -               User Key  (r) = Recorded By, (t) = Taken By, (c) = Cosigned By      Initials Name Provider Type     Nancy Arias PT Physical Therapist                   Mobility       Row Name 10/27/23 1053          Bed Mobility    Bed Mobility supine-sit  -     Supine-Sit [place] (Bed Mobility) standby assist  -     Assistive Device (Bed Mobility) bed rails;head of bed elevated  -     Comment, (Bed Mobility) Requires increased time d/t B LE pain  -       Row Name 10/27/23 1053          Transfers    Comment, (Transfers) VCs for hand placement and sequencing  -       Row Name 10/27/23 1053          Sit-Stand Transfer    Sit-Stand [place] (Transfers) contact guard;verbal cues  -     Assistive Device (Sit-Stand Transfers) cane, straight  -     Comment, (Sit-Stand Transfer) STS x2 from EOB. Pt stood ~1 min to don brief prior to ambulation  -       Row Name 10/27/23 1053          Gait/Stairs (Locomotion)    [place] Level (Gait) contact guard;verbal cues  -     Assistive Device (Gait) cane, straight  -     Distance in Feet (Gait) 350  -     Deviations/Abnormal Patterns (Gait) bilateral deviations;base of support, narrow;kita decreased;gait speed decreased;stride length decreased;weight shifting decreased  -     Bilateral Gait Deviations forward flexed posture  -     Comment, (Gait/Stairs) Pt demo'd step through gait pattern with decreased speed. VCs for sequencing and forward gaze. Increased thoracic kyphosis w/ head drop noted that pt is unable to correct with cues. No LOB noted. Distance limited by pain and fatigue  -               User Key  (r) =  Recorded By, (t) = Taken By, (c) = Cosigned By      Initials Name Provider Type     Nancy rAias PT Physical Therapist                   Obj/Interventions       Row Name 10/27/23 1056          Range of Motion Comprehensive    General Range of Motion bilateral lower extremity ROM WFL  -[facility] Name 10/27/23 1056          Strength Comprehensive (MMT)    General Manual Muscle Testing (MMT) Assessment lower extremity strength deficits identified  -     Comment, General Manual Muscle Testing (MMT) Assessment B knee flexion not tested d/t pain, observed grossly 4/5. B hip flexion 4/5  -       Row Name 10/27/23 1056          Balance    Balance Assessment sitting static balance;sitting dynamic balance;sit to stand dynamic balance;standing static balance;standing dynamic balance  -     Static Sitting Balance supervision  -     Dynamic Sitting Balance standby assist  -     Position, Sitting Balance unsupported;sitting edge of bed  -     Sit to Stand Dynamic Balance contact guard;verbal cues  -     Static Standing Balance standby assist;verbal cues  -     Dynamic Standing Balance contact guard;verbal cues  -     Position/Device Used, Standing Balance supported;cane, straight  -     Comment, Balance No LOB noted  -[facility] Name 10/27/23 1056          Sensory Assessment (Somatosensory)    Sensory Assessment (Somatosensory) LE sensation intact  -               User Key  (r) = Recorded By, (t) = Taken By, (c) = Cosigned By      Initials Name Provider Type     Nancy Arias PT Physical Therapist                   Goals/Plan       [facility] Name 10/27/23 1059          Bed Mobility Goal 1 (PT)    Activity/Assistive Device (Bed Mobility Goal 1, PT) sit to supine/supine to sit  -     [place] Level/Cues Needed (Bed Mobility Goal 1, PT) independent  -     Time Frame (Bed Mobility Goal 1, PT) long term goal (LTG);10 days  -       Row Name 10/27/23 1056          Transfer Goal 1 (PT)     Activity/Assistive Device (Transfer Goal 1, PT) sit-to-stand/stand-to-sit;bed-to-chair/chair-to-bed  -     [place] Level/Cues Needed (Transfer Goal 1, PT) independent  -     Time Frame (Transfer Goal 1, PT) long term goal (LTG);10 days  -       Row Name 10/27/23 1056          Gait Training Goal 1 (PT)    Activity/Assistive Device (Gait Training Goal 1, PT) gait (walking locomotion);assistive device use  -     [place] Level (Gait Training Goal 1, PT) modified independence  -     Distance (Gait Training Goal 1, PT) 500  -LH     Time Frame (Gait Training Goal 1, PT) long term goal (LTG);10 days  -       Row Name 10/27/23 8619          Therapy Assessment/Plan (PT)    Planned Therapy Interventions (PT) balance training;bed mobility training;gait training;home exercise program;neuromuscular re-education;patient/family education;postural re-education;ROM (range of motion);strengthening;stretching;transfer training  -               User Key  (r) = Recorded By, (t) = Taken By, (c) = Cosigned By      Initials Name Provider Type     Nancy Arias, PT Physical Therapist                   Clinical Impression       Row Name 10/27/23 8101          Pain    Pretreatment Pain Rating 6/10  -     Posttreatment Pain Rating 4/10  -     Pain Location - Side/Orientation Bilateral  -     Pain Location lower  -     Pain Location - extremity  -     Pre/Posttreatment Pain Comment tolerated  -     Pain Intervention(s) Repositioned;Ambulation/increased activity  -       Row Name 10/27/23 6246          Plan of Care Review    Plan of Care Reviewed With patient  -     Outcome Evaluation PT evaluation completed. Pt presents below baseline function d/t B LE edema, pain, and deficits in strength, balance, and activity tolerance. Pt ambulated 350 ft w/ SPC and CGA. Pt would benefit from skilled IP PT. Recommend home w/ assist and HHPT at d/c.  -       Row Name 10/27/23 9715          Therapy Assessment/Plan  (PT)    Patient/Family Therapy Goals Statement (PT) to go home  -     Rehab Potential (PT) good, to achieve stated therapy goals  -     Criteria for Skilled Interventions Met (PT) yes;meets criteria;skilled treatment is necessary  -     Therapy Frequency (PT) daily  -       Row Name 10/27/23 1057          Vital Signs    Post Systolic BP Rehab 118  -     Post Treatment Diastolic BP 61  -     Pretreatment Heart Rate (beats/min) 59  -     Posttreatment Heart Rate (beats/min) 57  -     Pre SpO2 (%) 94  -     O2 Delivery Pre Treatment nasal cannula  -     O2 Delivery Intra Treatment nasal cannula  -     Post SpO2 (%) 91  -     O2 Delivery Post Treatment room air  -     Pre Patient Position Supine  -     Intra Patient Position Standing  -     Post Patient Position Sitting  -       Row Name 10/27/23 1057          Positioning and Restraints    Pre-Treatment Position in bed  -     Post Treatment Position chair  -     In Chair reclined;call light within reach;encouraged to call for assist;exit alarm on;waffle cushion;legs elevated;notified [facility]  -               User Key  (r) = Recorded By, (t) = Taken By, (c) = Cosigned By      Initials Name Provider Type     Nancy Arias, PT Physical Therapist                   Outcome Measures       Row Name 10/27/23 1100 10/27/23 0100       How much help from another person do you currently need...    Turning from your back to your side while in flat bed without using bedrails? 3  - 3  -ZN    Moving from lying on back to sitting on the side of a flat bed without bedrails? 3  - 3  -ZN    Moving to and from a bed to a chair (including a wheelchair)? 3  - 3  -ZN    Standing up from a chair using your arms (e.g., wheelchair, bedside chair)? 3  - 3  -ZN    Climbing 3-5 steps with a railing? 2  - 2  -ZN    To walk in hospital room? 3  - 3  -ZN    AM-PAC 6 Clicks Score (PT) 17  - 17  -ZN    Highest level of mobility 5 --> Static standing   - 5 --> Static standing  -ZN      Row Name 10/27/23 1100          Functional Assessment    Outcome Measure Options AM-PAC 6 Clicks Basic Mobility (PT)  -               User Key  (r) = Recorded By, (t) = Taken By, (c) = Cosigned By      Initials Name Provider Type     Nancy Arias, ZULEIMA Physical Therapist    Mata Reis RN Registered Nurse                                 Physical Therapy Education       Title: PT OT SLP Therapies (In Progress)       Topic: Physical Therapy (In Progress)       Point: Mobility training (Done)       Learning Progress Summary             Patient Acceptance, E, VU,NR by  at 10/27/2023 1100                         Point: Home exercise program (Not Started)       Learner Progress:  Not documented in this visit.              Point: Body mechanics (Done)       Learning Progress Summary             Patient Acceptance, E, VU,NR by  at 10/27/2023 1100                         Point: Precautions (Done)       Learning Progress Summary             Patient Acceptance, E, VU,NR by  at 10/27/2023 1100                                         User Key       Initials Effective Dates Name Provider Type Discipline     09/21/23 -  Nancy Arias, PT Physical Therapist PT                  PT Recommendation and Plan  Planned Therapy Interventions (PT): balance training, bed mobility training, gait training, home exercise program, neuromuscular re-education, patient/family education, postural re-education, ROM (range of motion), strengthening, stretching, transfer training  Plan of Care Reviewed With: patient  Outcome Evaluation: PT evaluation completed. Pt presents below baseline function d/t B LE edema, pain, and deficits in strength, balance, and activity tolerance. Pt ambulated 350 ft w/ SPC and CGA. Pt would benefit from skilled IP PT. Recommend home w/ assist and HHPT at d/c.     Time Calculation:   PT Evaluation Complexity  History, PT Evaluation Complexity: 3 or more personal  factors and/or comorbidities  Examination of Body Systems (PT Eval Complexity): total of 3 or more elements  Clinical Presentation (PT Evaluation Complexity): stable  Clinical Decision Making (PT Evaluation Complexity): low complexity  Overall Complexity (PT Evaluation Complexity): low complexity     PT Charges       Row Name 10/27/23 1101             Time Calculation    Start Time 1015  -LH      PT Received On 10/27/23  -      PT Goal Re-Cert Due Date 11/06/23  -         Timed Charges    47789 - PT Therapeutic Activity Minutes 8  -LH         Untimed Charges    PT Eval/Re-eval Minutes 46  -LH         Total Minutes    Timed Charges Total Minutes 8  -LH      Untimed Charges Total Minutes 46  -LH       Total Minutes 54  -LH                User Key  (r) = Recorded By, (t) = Taken By, (c) = Cosigned By      Initials Name Provider Type     Nancy Arias, PT Physical Therapist                  Therapy Charges for Today       Code Description Service Date Service Provider Modifiers Qty    17237060315 HC PT THERAPEUTIC ACT EA 15 MIN 10/27/2023 Nancy Arias, PT GP 1    37962910741 HC PT EVAL LOW COMPLEXITY 4 10/27/2023 Nancy Arias, PT GP 1            PT G-Codes  Outcome Measure Options: AM-PAC 6 Clicks Basic Mobility (PT)  AM-PAC 6 Clicks Score (PT): 17  PT Discharge Summary  Anticipated Discharge Disposition (PT): home with assist, home with home health    Nancy Arias PT  10/27/2023

## 2023-10-27 NOTE — CASE MANAGEMENT/SOCIAL WORK
Continued Stay Note  [facility]     Patient Name: Sheree Samuel  MRN: [id number]  Today's Date: 10/27/2023    Admit Date: 10/25/2023    Plan: home with [facility]   Discharge Plan       Row Name 10/27/23 1532       Plan    Plan home with [facility]    Patient/Family in Agreement with Plan yes    Plan Comments CM met with the patient at the bedside. She is asking for CM to arrange home health. She would like to remain with [facility] and requested a referral to [facility]. CM made the referral and they have accepted pending the patient going to her PCP appointment on Thursday, Nov 2 at 1015. CM explained to the patient that she will have to go to this appointment, establish care there, and then home health will pick her up as a patient. Patient and family in room verbalized understanding. CM explained situation to [facility] and they are also in agreement. Orders are in Epic for nursing and home therapy. CM following.    Final Discharge Disposition Code 06 - home with home health care                   Discharge Codes    No documentation.                 Expected Discharge Date and Time       Expected Discharge Date Expected Discharge Time    Oct 28, 2023               Africa Samuels RN

## 2023-10-27 NOTE — PLAN OF CARE
Goal Outcome Evaluation: No issues during shift. Patient was able to rest well.

## 2023-10-27 NOTE — PLAN OF CARE
Goal Outcome Evaluation:  Plan of Care Reviewed With: patient           Outcome Evaluation: PT evaluation completed. Pt presents below baseline function d/t B LE edema, pain, and deficits in strength, balance, and activity tolerance. Pt ambulated 350 ft w/ SPC and CGA. Pt would benefit from skilled IP PT. Recommend home w/ assist and HHPT at d/c.      Anticipated Discharge Disposition (PT): home with assist, home with home health
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
## 2023-10-28 ENCOUNTER — READMISSION MANAGEMENT (OUTPATIENT)
Dept: CALL CENTER | Facility: HOSPITAL | Age: 68
End: 2023-10-28
Payer: COMMERCIAL

## 2023-10-28 VITALS
SYSTOLIC BLOOD PRESSURE: 134 MMHG | DIASTOLIC BLOOD PRESSURE: 50 MMHG | TEMPERATURE: 97.1 F | HEART RATE: 58 BPM | BODY MASS INDEX: 27.67 KG/M2 | OXYGEN SATURATION: 91 % | RESPIRATION RATE: 20 BRPM | WEIGHT: 123 LBS | HEIGHT: 56 IN

## 2023-10-28 LAB
ANION GAP SERPL CALCULATED.3IONS-SCNC: 8 MMOL/L (ref 5–15)
BACTERIA SPEC AEROBE CULT: ABNORMAL
BUN SERPL-MCNC: 18 MG/DL (ref 8–23)
BUN/CREAT SERPL: 22.8 (ref 7–25)
CALCIUM SPEC-SCNC: 8.8 MG/DL (ref 8.6–10.5)
CHLORIDE SERPL-SCNC: 101 MMOL/L (ref 98–107)
CO2 SERPL-SCNC: 31 MMOL/L (ref 22–29)
CREAT SERPL-MCNC: 0.79 MG/DL (ref 0.57–1)
EGFRCR SERPLBLD CKD-EPI 2021: 81.6 ML/MIN/1.73
GLUCOSE SERPL-MCNC: 97 MG/DL (ref 65–99)
MAGNESIUM SERPL-MCNC: 1.9 MG/DL (ref 1.6–2.4)
POTASSIUM SERPL-SCNC: 4.5 MMOL/L (ref 3.5–5.2)
SODIUM SERPL-SCNC: 140 MMOL/L (ref 136–145)

## 2023-10-28 PROCEDURE — G0378 HOSPITAL OBSERVATION PER HR: HCPCS

## 2023-10-28 PROCEDURE — 83735 ASSAY OF MAGNESIUM: CPT | Performed by: STUDENT IN AN ORGANIZED HEALTH CARE EDUCATION/TRAINING PROGRAM

## 2023-10-28 PROCEDURE — 80048 BASIC METABOLIC PNL TOTAL CA: CPT | Performed by: INTERNAL MEDICINE

## 2023-10-28 RX ORDER — CEFUROXIME AXETIL 500 MG/1
500 TABLET ORAL 2 TIMES DAILY
Qty: 10 TABLET | Refills: 0 | Status: SHIPPED | OUTPATIENT
Start: 2023-10-28 | End: 2023-11-02

## 2023-10-28 RX ORDER — ROSUVASTATIN CALCIUM 20 MG/1
40 TABLET, COATED ORAL DAILY
Start: 2023-10-28 | End: 2023-11-02

## 2023-10-28 RX ORDER — DOXYCYCLINE HYCLATE 100 MG/1
100 CAPSULE ORAL 2 TIMES DAILY
Qty: 10 CAPSULE | Refills: 0 | Status: SHIPPED | OUTPATIENT
Start: 2023-10-28

## 2023-10-28 RX ADMIN — ASPIRIN 81 MG CHEWABLE TABLET 81 MG: 81 TABLET CHEWABLE at 08:36

## 2023-10-28 RX ADMIN — GABAPENTIN 400 MG: 400 CAPSULE ORAL at 08:38

## 2023-10-28 RX ADMIN — DOXYCYCLINE 100 MG: 100 INJECTION, POWDER, LYOPHILIZED, FOR SOLUTION INTRAVENOUS at 12:07

## 2023-10-28 RX ADMIN — Medication 10 ML: at 08:36

## 2023-10-28 RX ADMIN — SERTRALINE HYDROCHLORIDE 100 MG: 100 TABLET ORAL at 08:36

## 2023-10-28 RX ADMIN — PRAMIPEXOLE DIHYDROCHLORIDE 0.12 MG: 0.25 TABLET ORAL at 08:35

## 2023-10-28 RX ADMIN — SENNOSIDES AND DOCUSATE SODIUM 2 TABLET: 8.6; 5 TABLET ORAL at 08:35

## 2023-10-28 RX ADMIN — METOPROLOL TARTRATE 25 MG: 25 TABLET, FILM COATED ORAL at 08:35

## 2023-10-28 RX ADMIN — BUSPIRONE HYDROCHLORIDE 10 MG: 10 TABLET ORAL at 08:36

## 2023-10-28 RX ADMIN — APIXABAN 2.5 MG: 2.5 TABLET, FILM COATED ORAL at 08:35

## 2023-10-28 RX ADMIN — ROSUVASTATIN 40 MG: 20 TABLET, FILM COATED ORAL at 08:36
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
## 2023-10-28 NOTE — CASE MANAGEMENT/SOCIAL WORK
Case Management Discharge Note      Final Note: Patient is discharging today, 10/28 to home with [facility] for SN & PT. Verified order is in epic. CM called [facility] at [phone] and updated Pennie that patient is discharging today. Pennie to update Bailee. The referral was accepted pending the patient going to her PCP appointment on Thursday, Nov 2 at 1015.  Per primary CM's note from yesterday, 10/27, she explained to the patient that she will have to go to this appointment, establish care there, and then [facility] will pick her up as a patient and that the patient and family in room verbalized understanding. No other needs noted.         Selected Continued Care - Admitted Since 10/25/2023       Destination    No services have been selected for the patient.                Durable Medical Equipment    No services have been selected for the patient.                Dialysis/Infusion    No services have been selected for the patient.                Home Medical Care       Service Provider Selected Services Address Phone Fax Patient Preferred    [facility] [address] [facility] [address] [phone] --              Therapy    No services have been selected for the patient.                Community Resources    No services have been selected for the patient.                Community & DME    No services have been selected for the patient.                         Final Discharge Disposition Code: 06 - home with home health care

## 2023-10-28 NOTE — OUTREACH NOTE
Prep Survey      Flowsheet Row Responses   Gnosticism [facility] patient discharged from? [place]   Is LACE score < 7 ? Yes   Eligibility [facility]   Date of Admission 10/26/23   Date of Discharge 10/28/23   Discharge Disposition Home or Self Care   Discharge diagnosis Pneumonia   Does the patient have one of the following disease processes/diagnoses(primary or secondary)? Pneumonia   Does the patient have Home health ordered? Yes   What is the Home health agency?   [facility]   Is there a DME ordered? No   Prep survey completed? Yes            Ely HOLMAN - Registered Nurse

## 2023-10-30 ENCOUNTER — TRANSITIONAL CARE MANAGEMENT TELEPHONE ENCOUNTER (OUTPATIENT)
Dept: CALL CENTER | Facility: HOSPITAL | Age: 68
End: 2023-10-30
Payer: COMMERCIAL

## 2023-10-30 NOTE — OUTREACH NOTE
Call Center TCM Note      Flowsheet Row Responses   [facility] patient discharged from? [place]   Does the patient have one of the following disease processes/diagnoses(primary or secondary)? Pneumonia   TCM attempt successful? Yes   Call start time 1653   Call end time 1701   Discharge diagnosis Pneumonia   Is patient permission given to speak with other caregiver? Yes   List who call center can speak with , TALON Workman   Person spoke with today (if not patient) and relationship , AlessioTALON Ashford   Meds reviewed with patient/caregiver? Yes   Does the patient have all medications ordered at discharge? Yes   Is the patient taking all medications as directed (includes completed medication regime)? Yes   Medication comments  reports patient has all current meds.   Comments Patient has a New Patient appt with PCP Delilah [facility] for 11/2/23  1015am   Does the patient have an appointment with their PCP within 7-14 days of discharge? Yes   What is the Home health agency?  [facility] (Will not be able to start seeing until seen by PCP)   DME comments  reports that patient does have O2 available at home. Did use some yesterday.   Pulse Ox monitoring None   Psychosocial issues? No   Did the patient receive a copy of their discharge instructions? Yes   Nursing interventions Reviewed instructions with patient  []   What is the patient's perception of their health status since discharge? Improving   Nursing Interventions Nurse provided patient education   If the patient is a current smoker, are they able to teach back resources for cessation? Not a smoker   Is the patient/caregiver able to teach back the hierarchy of who to call/visit for symptoms/problems? PCP, Specialist, Home health nurse, Urgent Care, ED, 911 Yes   Is the patient/caregiver able to teach back signs and symptoms of worsening condition: Fever/chills, Chest pain, Shortness of breath   TCM call completed? Yes    Wrap up additional comments  reports patient follows with pain management and has a pain pump and also a bladder stimulator.   Call end time 1701   Would this patient benefit from a Referral to [facility] Social Work? No   Is the patient interested in additional calls from an ambulatory ? No            Asya Perla RN    10/30/2023, 17:03 EDT

## 2023-10-31 LAB
BACTERIA SPEC AEROBE CULT: NORMAL
BACTERIA SPEC AEROBE CULT: NORMAL

## 2023-11-02 ENCOUNTER — OFFICE VISIT (OUTPATIENT)
Dept: INTERNAL MEDICINE | Facility: CLINIC | Age: 68
End: 2023-11-02
Payer: COMMERCIAL

## 2023-11-02 ENCOUNTER — HOME HEALTH ADMISSION (OUTPATIENT)
Dept: HOME HEALTH SERVICES | Facility: HOME HEALTHCARE | Age: 68
End: 2023-11-02
Payer: COMMERCIAL

## 2023-11-02 ENCOUNTER — TELEPHONE (OUTPATIENT)
Dept: INTERNAL MEDICINE | Facility: CLINIC | Age: 68
End: 2023-11-02

## 2023-11-02 VITALS
BODY MASS INDEX: 27.31 KG/M2 | WEIGHT: 118 LBS | DIASTOLIC BLOOD PRESSURE: 60 MMHG | SYSTOLIC BLOOD PRESSURE: 90 MMHG | HEART RATE: 52 BPM | HEIGHT: 55 IN | TEMPERATURE: 97.7 F

## 2023-11-02 DIAGNOSIS — J18.9 PNEUMONIA OF BOTH LUNGS DUE TO INFECTIOUS ORGANISM, UNSPECIFIED PART OF LUNG: ICD-10-CM

## 2023-11-02 DIAGNOSIS — Z92.89 HOSPITALIZATION WITHIN LAST 30 DAYS: Primary | ICD-10-CM

## 2023-11-02 DIAGNOSIS — F33.2 MAJOR DEPRESSIVE DISORDER, RECURRENT SEVERE WITHOUT PSYCHOTIC FEATURES: ICD-10-CM

## 2023-11-02 DIAGNOSIS — I10 ESSENTIAL (PRIMARY) HYPERTENSION: ICD-10-CM

## 2023-11-02 DIAGNOSIS — E11.9 TYPE 2 DIABETES MELLITUS WITHOUT COMPLICATION, WITHOUT LONG-TERM CURRENT USE OF INSULIN: ICD-10-CM

## 2023-11-02 RX ORDER — BLOOD-GLUCOSE METER
1 KIT MISCELLANEOUS ONCE
Qty: 1 EACH | Refills: 0 | Status: SHIPPED | OUTPATIENT
Start: 2023-11-02 | End: 2023-11-02

## 2023-11-02 RX ORDER — FLUTICASONE PROPIONATE AND SALMETEROL 250; 50 UG/1; UG/1
1 POWDER RESPIRATORY (INHALATION) DAILY PRN
COMMUNITY
Start: 2023-10-23

## 2023-11-02 RX ORDER — LANCETS 30 GAUGE
EACH MISCELLANEOUS
Qty: 100 EACH | Refills: 1 | Status: SHIPPED | OUTPATIENT
Start: 2023-11-02

## 2023-11-02 RX ORDER — BUSPIRONE HYDROCHLORIDE 7.5 MG/1
7.5 TABLET ORAL 3 TIMES DAILY
Qty: 270 TABLET | Refills: 1 | Status: SHIPPED | OUTPATIENT
Start: 2023-11-02

## 2023-11-02 NOTE — TELEPHONE ENCOUNTER
LVM for the patient to return call.     HUB TO READ: Does she want us to mail her the form or  in office?
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
## 2023-11-02 NOTE — TELEPHONE ENCOUNTER
Caller: Sheree Samuel    Relationship: Self    Best call back number: [phone]     What was the call regarding: PATIENT FORGOT TO ASK AT HER APPOINTMENT TO GET A HANDICAP TAG AND SHE WOULD LIKE TO KNOW HOW SHE CAN GET ONE.

## 2023-11-03 NOTE — TELEPHONE ENCOUNTER
Name: Sheree Samuel    Relationship: Self    Best Callback Number:      HUB PROVIDED THE RELAY MESSAGE FROM THE OFFICE   PATIENT VOICED UNDERSTANDING AND HAS NO FURTHER QUESTIONS AT THIS TIME    ADDITIONAL INFORMATION: PLEASE MAIL FORM FOR HANDICAPPED TAG  [address] [place] [address]

## 2023-11-04 ENCOUNTER — HOME CARE VISIT (OUTPATIENT)
Dept: HOME HEALTH SERVICES | Facility: HOME HEALTHCARE | Age: 68
End: 2023-11-04
Payer: COMMERCIAL

## 2023-11-04 PROCEDURE — G0299 HHS/HOSPICE OF RN EA 15 MIN: HCPCS

## 2023-11-05 VITALS
RESPIRATION RATE: 18 BRPM | HEART RATE: 52 BPM | SYSTOLIC BLOOD PRESSURE: 140 MMHG | OXYGEN SATURATION: 96 % | BODY MASS INDEX: 26.61 KG/M2 | DIASTOLIC BLOOD PRESSURE: 68 MMHG | HEIGHT: 55 IN | TEMPERATURE: 96.9 F | WEIGHT: 115 LBS

## 2023-11-05 NOTE — HOME HEALTH
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 Patient is currently staying with daughter but they are building a house next door. Patient is alert and oriented x 4, responsive and mood stable. Patient has a long extensive health history but the most recent hospitalization for PNA. Patient is home under the care of /daughter. Patient use oxygen intermittently. Patient is to  new glucometer to manage DM. Patient has advanced RA that has caused skeletal changes and gait imbalances. Patient has a healing left leg ulceration that is almost healed and a scab to RLE. Patient has clear breath sounds and regular heart beat. Patient has dizziness oftne, chronic pain, and food need to cooked on the softer side to help her to chew food. Patient reports having sveral falls inthe past and that she has lost more than 10 lbs in the year. Patient will need continous monitoring and education on PNA management, teaching on glucometer use, and medication teaching.    Home Health ordered for: disciplines SN/PT/OT    Reason for Hosp/Primary Dx/Co-morbidities: PNA, CHF, CAD, HTN, HLD, COPD, hx PE, depression, RA, and DMII.    Focus of Care: PNA teaching and wound care    Patient's goal(s): Patient want \" less pain, be able to walk more steadily\"    Current Functional status/mobility/DME: Walker    HB status/Living Arrangements: Lives with /daughter    Skin Integrity/wound status: Has a healing left leg venous ulcer and a scabbed area to RLE.    Code Status: Full Code    Fall Risk/Safety concerns: High fall risk, walker    Medication issues/Concerns:N/A    Additional Problems/Concerns: n/a    SDOH Barriers (i.e. caregiver concerns, social isolation, transportation, food insecurity, environment, income etc.)/Need for MSW: n/a    Plan for next visit: CP assessment, PNA teaching, medication teaching, and wound care."

## 2023-11-06 ENCOUNTER — HOME CARE VISIT (OUTPATIENT)
Dept: HOME HEALTH SERVICES | Facility: HOME HEALTHCARE | Age: 68
End: 2023-11-06
Payer: COMMERCIAL

## 2023-11-06 VITALS
HEART RATE: 65 BPM | OXYGEN SATURATION: 96 % | SYSTOLIC BLOOD PRESSURE: 142 MMHG | RESPIRATION RATE: 18 BRPM | DIASTOLIC BLOOD PRESSURE: 66 MMHG | TEMPERATURE: 96.8 F

## 2023-11-06 PROCEDURE — G0151 HHCP-SERV OF PT,EA 15 MIN: HCPCS

## 2023-11-06 NOTE — Clinical Note
[facility] PT eval complete 11/6/23 with PT to address deficits in strength, balance, functional mobility, and activity tolerance with frequency 1w5.      Patient reports 2 falls this morning one hour apart while sweeping kitchen.  Able to get up independently with no injuries reported.

## 2023-11-06 NOTE — CASE COMMUNICATION
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 Patient is currently staying with daughter but they are building a house next door. Patient is alert and oriented x 4, responsive and mood stable. Patient has a long extensive health history but the most recent hospitalization for PNA. Patient is home under the care of /daughter. Patient use oxygen intermittently. Patient is to  n ew glucometer to manage DM. Patient has advanced RA that has caused skeletal changes and gait imbalances. Patient has a healing left leg ulceration that is almost healed and a scab to RLE. Patient has clear breath sounds and regular heart beat. Patient has dizziness oftne, chronic pain, and food need to cooked on the softer side to help her to chew food. Patient reports having sveral falls inthe past and that she has lost more than 10 l bs in the year. Patient will need continous monitoring and education on PNA management, teaching on glucometer use, and medication teaching.    Home Health ordered for: disciplines SN/PT/OT    Reason for Hosp/Primary Dx/Co-morbidities: PNA, CHF, CAD, HTN, HLD, COPD, hx PE, depression, RA, and DMII.    Focus of Care: PNA teaching and wound care    Patient's goal(s): Patient want \" less pain, be able to walk more steadily\"    Current Func tional status/mobility/DME: Walker    HB status/Living Arrangements: Lives with /daughter    Skin Integrity/wound status: Has a healing left leg venous ulcer and a scabbed area to RLE.    Code Status: Full Code    Fall Risk/Safety concerns: High fall risk, walker    Medication issues/Concerns:N/A    Additional Problems/Concerns: n/a    SDOH Barriers (i.e. caregiver concerns, social isolation, transportation, food insecurity, envi ronment, income etc.)/Need for MSW: n/a    Plan for next visit: CP assessment, PNA teaching, medication teaching, and wound care.  "

## 2023-11-07 ENCOUNTER — HOME CARE VISIT (OUTPATIENT)
Dept: HOME HEALTH SERVICES | Facility: HOME HEALTHCARE | Age: 68
End: 2023-11-07
Payer: COMMERCIAL

## 2023-11-07 VITALS
DIASTOLIC BLOOD PRESSURE: 65 MMHG | OXYGEN SATURATION: 93 % | TEMPERATURE: 96.9 F | HEART RATE: 78 BPM | SYSTOLIC BLOOD PRESSURE: 122 MMHG

## 2023-11-07 PROCEDURE — G0299 HHS/HOSPICE OF RN EA 15 MIN: HCPCS
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
## 2023-11-09 ENCOUNTER — HOME CARE VISIT (OUTPATIENT)
Dept: HOME HEALTH SERVICES | Facility: HOME HEALTHCARE | Age: 68
End: 2023-11-09
Payer: COMMERCIAL

## 2023-11-09 VITALS
SYSTOLIC BLOOD PRESSURE: 100 MMHG | OXYGEN SATURATION: 96 % | HEART RATE: 60 BPM | TEMPERATURE: 97.3 F | DIASTOLIC BLOOD PRESSURE: 55 MMHG

## 2023-11-09 PROCEDURE — G0299 HHS/HOSPICE OF RN EA 15 MIN: HCPCS

## 2023-11-09 NOTE — HOME HEALTH
Routine Visit Note:    Skill/education provided went over medications and side effects VS WNL     Patient/caregiver response verblized understanding     Plan for next visit med managment and Dx education     Other pertinent info

## 2023-11-15 ENCOUNTER — HOME CARE VISIT (OUTPATIENT)
Dept: HOME HEALTH SERVICES | Facility: HOME HEALTHCARE | Age: 68
End: 2023-11-15
Payer: COMMERCIAL

## 2023-11-15 VITALS
DIASTOLIC BLOOD PRESSURE: 78 MMHG | TEMPERATURE: 97.5 F | SYSTOLIC BLOOD PRESSURE: 134 MMHG | HEART RATE: 62 BPM | RESPIRATION RATE: 18 BRPM | OXYGEN SATURATION: 94 %

## 2023-11-15 VITALS
TEMPERATURE: 96.7 F | DIASTOLIC BLOOD PRESSURE: 68 MMHG | HEART RATE: 68 BPM | OXYGEN SATURATION: 94 % | RESPIRATION RATE: 16 BRPM | SYSTOLIC BLOOD PRESSURE: 120 MMHG

## 2023-11-15 PROCEDURE — G0151 HHCP-SERV OF PT,EA 15 MIN: HCPCS

## 2023-11-15 PROCEDURE — G0300 HHS/HOSPICE OF LPN EA 15 MIN: HCPCS

## 2023-11-15 NOTE — HOME HEALTH
Routine Visit Note: LPN    Skill/education provided:  C/P assessmentDiabetic teaching, medication review and education. Pt states she does not check her BG she has a glucometer but it is not like the one she has and she does not like it. Pt is currently managing her DM 2 by PO medication and diet. Pt denies falls, no wound or skin concerns noted. Pt has recently healed from a Cellulitis in BLE but has resolved. No changes in medications made. educated pt on high risk medications. pt verbalized understanding when to call 911 vs pcp. Lungs are CTA lobes. pt has a mild productive wet cough and reports sinus congestion. V/S WDL , pt is afebrile ATT.    Patient/caregiver response: pt will continue to use walker and cane for ambulation due to generalized weakness and unsteady gait. pt will take all medications as prescribed and call pcp with adverse reactions or 911 if life threatening.    Plan for next visit: C/P assessment medication review,    Other pertinent info:

## 2023-11-20 ENCOUNTER — HOME CARE VISIT (OUTPATIENT)
Dept: HOME HEALTH SERVICES | Facility: HOME HEALTHCARE | Age: 68
End: 2023-11-20
Payer: COMMERCIAL

## 2023-11-20 VITALS
DIASTOLIC BLOOD PRESSURE: 80 MMHG | OXYGEN SATURATION: 98 % | HEART RATE: 75 BPM | SYSTOLIC BLOOD PRESSURE: 130 MMHG | RESPIRATION RATE: 16 BRPM

## 2023-11-20 PROCEDURE — G0157 HHC PT ASSISTANT EA 15: HCPCS

## 2023-11-22 ENCOUNTER — HOME CARE VISIT (OUTPATIENT)
Dept: HOME HEALTH SERVICES | Facility: HOME HEALTHCARE | Age: 68
End: 2023-11-22
Payer: COMMERCIAL

## 2023-11-22 VITALS
SYSTOLIC BLOOD PRESSURE: 100 MMHG | TEMPERATURE: 97.3 F | OXYGEN SATURATION: 98 % | DIASTOLIC BLOOD PRESSURE: 65 MMHG | HEART RATE: 49 BPM

## 2023-11-22 PROCEDURE — G0299 HHS/HOSPICE OF RN EA 15 MIN: HCPCS

## 2023-11-30 ENCOUNTER — HOME CARE VISIT (OUTPATIENT)
Dept: HOME HEALTH SERVICES | Facility: HOME HEALTHCARE | Age: 68
End: 2023-11-30
Payer: COMMERCIAL

## 2023-11-30 PROCEDURE — G0299 HHS/HOSPICE OF RN EA 15 MIN: HCPCS

## 2023-12-01 ENCOUNTER — HOME CARE VISIT (OUTPATIENT)
Dept: HOME HEALTH SERVICES | Facility: HOME HEALTHCARE | Age: 68
End: 2023-12-01
Payer: COMMERCIAL

## 2023-12-01 VITALS
SYSTOLIC BLOOD PRESSURE: 144 MMHG | OXYGEN SATURATION: 95 % | DIASTOLIC BLOOD PRESSURE: 72 MMHG | TEMPERATURE: 96.8 F | RESPIRATION RATE: 18 BRPM | HEART RATE: 64 BPM

## 2023-12-01 PROCEDURE — G0151 HHCP-SERV OF PT,EA 15 MIN: HCPCS

## 2023-12-04 ENCOUNTER — OFFICE VISIT (OUTPATIENT)
Dept: INTERNAL MEDICINE | Facility: CLINIC | Age: 68
End: 2023-12-04
Payer: COMMERCIAL

## 2023-12-04 VITALS
HEIGHT: 55 IN | WEIGHT: 118 LBS | DIASTOLIC BLOOD PRESSURE: 52 MMHG | BODY MASS INDEX: 27.31 KG/M2 | TEMPERATURE: 98.6 F | SYSTOLIC BLOOD PRESSURE: 122 MMHG | HEART RATE: 60 BPM

## 2023-12-04 DIAGNOSIS — E78.5 HYPERLIPIDEMIA LDL GOAL <70: ICD-10-CM

## 2023-12-04 DIAGNOSIS — E11.9 TYPE 2 DIABETES MELLITUS WITHOUT COMPLICATION, WITHOUT LONG-TERM CURRENT USE OF INSULIN: ICD-10-CM

## 2023-12-04 DIAGNOSIS — R53.83 OTHER FATIGUE: ICD-10-CM

## 2023-12-04 DIAGNOSIS — Z11.59 ENCOUNTER FOR HEPATITIS C SCREENING TEST FOR LOW RISK PATIENT: ICD-10-CM

## 2023-12-04 DIAGNOSIS — H53.2 DOUBLE VISION: ICD-10-CM

## 2023-12-04 DIAGNOSIS — R21 SKIN RASH: Primary | ICD-10-CM

## 2023-12-04 DIAGNOSIS — F33.2 MAJOR DEPRESSIVE DISORDER, RECURRENT SEVERE WITHOUT PSYCHOTIC FEATURES: ICD-10-CM

## 2023-12-04 DIAGNOSIS — R41.89 IMPAIRED COGNITION: ICD-10-CM

## 2023-12-04 DIAGNOSIS — F41.9 ANXIETY: ICD-10-CM

## 2023-12-04 DIAGNOSIS — Z13.21 ENCOUNTER FOR VITAMIN DEFICIENCY SCREENING: ICD-10-CM

## 2023-12-04 DIAGNOSIS — I10 ESSENTIAL (PRIMARY) HYPERTENSION: ICD-10-CM

## 2023-12-04 DIAGNOSIS — Z13.29 SCREENING FOR ENDOCRINE DISORDER: ICD-10-CM

## 2023-12-04 RX ORDER — BUSPIRONE HYDROCHLORIDE 7.5 MG/1
7.5 TABLET ORAL 3 TIMES DAILY
Qty: 270 TABLET | Refills: 1 | Status: SHIPPED | OUTPATIENT
Start: 2023-12-04

## 2023-12-04 RX ORDER — DOXYCYCLINE HYCLATE 100 MG/1
100 CAPSULE ORAL 2 TIMES DAILY
Qty: 20 CAPSULE | Refills: 0 | Status: SHIPPED | OUTPATIENT
Start: 2023-12-04 | End: 2023-12-14

## 2023-12-04 RX ORDER — BLOOD-GLUCOSE METER
EACH MISCELLANEOUS
COMMUNITY
Start: 2023-11-02

## 2023-12-04 NOTE — PROGRESS NOTES
Office Note     Name: Sheree Samuel    : 1955     MRN: [id number]   Care Team: Patient Care Team:  Delilah Becker APRN as PCP - General (Family Medicine)  Macho Calabrese MD as Cardiologist (Cardiology)    Chief Complaint  Hypertension, Pneumonia, and Diabetes    Subjective     History of Present Illness:  Sheree Samuel is a 68-year-old female who presents for evaluation of multiple medical concerns. She is accompanied by her sister.    Rash  She has had poison ivy on her left lower extremity for 1.5 months. It started out as a rash about 6 weeks ago and then it progressively worsened. She is wearing flip flops. She has been using Medihoney. She is doing at home physical therapy and home health. She took the Medihoney off this morning because she was coming to her visit. She does not feel like it is healing well. She completed a course of antibiotics a couple weeks ago. She tolerated them well except for one. She is doing well in therapy. She had a fall since her last visit. She lost her balance. She uses a cane intermittently in the house. She is in the process of moving and is staying at her sister's house.    Anxiety  She is really stressed since she has been out of the hospital. Her anxiety has increased. She needs a refill on Buspar.    Memory loss  She has difficulty with her memory. Her provider in [place] was going to refer her to a neurologist, but since she was moving, she was not referred She has not seen a neurologist since moving to [place]. However, she has been seen by a neurologist in the past for potential intracranial hemorrhage.    Hypertension  She has not been checking her blood pressure at home. Home health has kept track of her blood pressure readings. She denies any issues with her blood pressure. She needs a refill on metoprolol.    Vision problems  She is still having vision problems but notes it is not related to her hypertension. Her last eye exam was about 8 months  ago. She had cataract surgery on both eyes in [place]. She has reading glasses. She has double vision. It has worsened since the cataract surgery. She has not seen an optometrist since she returned.    General health  She admits to not consuming enough fluids because she does not have a state for anything. She makes simple dishes. She drinks at most a bottle of water per day. She drinks 2 to 3 Mountain Dew a day. She did not drink at all for 3 days. She received the influenza vaccine on 10/2023. She has not had a colonoscopy in 2-3 years.    Narcolepsy  She is wondering if she has narcolepsy. She is not getting decent sleep at night. She sleeps in the living room on the recliner. She does not feel rested when she slept in the recliner. She has not been monitoring her blood glucose recently. She has a blood glucose monitoring system at home. She is not currently connected to it. She just needs to get it out of storage and use it.      Past Medical History:   Diagnosis Date    Anxiety     Arthritis     ASHD (arteriosclerotic heart disease)     Cancer     Cervical disc disorder of mid-cervical region     Chronic pain     Depression     Depression     Diabetes mellitus     Heart disease     Hyperlipidemia     Hypertension     MRSA carrier     Myocardial infarction     Pulmonary embolism        Past Surgical History:   Procedure Laterality Date    BREAST EXCISIONAL BIOPSY Right     x 2    CARDIAC CATHETERIZATION N/A 8/22/2016    Procedure: Left Heart Cath with +/- CBI;  Surgeon: Bernard Palumbo MD;  Location:  [facility];  Service:     CARDIAC CATHETERIZATION Bilateral 7/12/2018    Procedure: Right and Left Heart Cath;  Surgeon: Seth Farnsworth MD;  Location:  [facility];  Service: Cardiovascular    CORONARY ARTERY BYPASS GRAFT      CORONARY ARTERY BYPASS GRAFT      HYSTERECTOMY      OOPHORECTOMY      PAIN PUMP INSERTION/REVISION      PAIN PUMP INSERTION/REVISION       REDUCTION MAMMAPLASTY      early 80's       Social History     Socioeconomic History    Marital status:    Tobacco Use    Smoking status: Never    Smokeless tobacco: Never   Vaping Use    Vaping Use: Never used   Substance and Sexual Activity    Alcohol use: No    Drug use: No    Sexual activity: Defer         Current Outpatient Medications:     Blood Glucose Monitoring Suppl (Accu-Chek Guide Me) w/Device kit, USE TO CHECK GLUCOSE AS DIRECTED ONCE DAILY, Disp: , Rfl:     busPIRone (BUSPAR) 7.5 MG tablet, Take 1 tablet by mouth 3 (Three) Times a Day. Indications: Anxiety Disorder, Major Depressive Disorder, Disp: 270 tablet, Rfl: 1    metoprolol tartrate (LOPRESSOR) 25 MG tablet, Take 1 tablet by mouth 2 (Two) Times a Day. Indications: High Blood Pressure Disorder, Disp: 180 tablet, Rfl: 1    albuterol (PROVENTIL HFA;VENTOLIN HFA) 108 (90 Base) MCG/ACT inhaler, Inhale 2 puffs Every 4 (Four) Hours As Needed for Wheezing. Indications: Chronic Obstructive Lung Disease, Disp: , Rfl:     amLODIPine (NORVASC) 5 MG tablet, Take 1 tablet by mouth Daily. Indications: High Blood Pressure Disorder, Disp: , Rfl:     apixaban (ELIQUIS) 2.5 MG tablet tablet, Take 1 tablet by mouth Every 12 (Twelve) Hours. Indications: history of DVT/PE, Disp: 60 tablet, Rfl: 0    aspirin 81 MG chewable tablet, Chew 1 tablet Daily. Indications: Disease involving Lipid Deposits in the Arteries, Disp: , Rfl:     doxycycline (VIBRAMYCIN) 100 MG capsule, Take 1 capsule by mouth 2 (Two) Times a Day for 10 days., Disp: 20 capsule, Rfl: 0    Famotidine (PEPCID PO), Take 1 tablet by mouth 2 (Two) Times a Day. Indications: GERD, Disp: , Rfl:     Fluticasone-Salmeterol (ADVAIR/WIXELA) 250-50 MCG/ACT DISKUS, Inhale 1 puff Daily As Needed. Indications: Chronic Obstructive Lung Disease, Disp: , Rfl:     gabapentin (NEURONTIN) 400 MG capsule, Take 1 capsule by mouth 3 (Three) Times a Day. Indications: Diabetes with Nerve Disease, Disp: , Rfl:     glucose  blood test strip, Use to check blood sugars once daily, use strips covered by insurance., Disp: 100 each, Rfl: 12    Lancets misc, Use one daily to check blood sugars, use lancets covered by insurance., Disp: 100 each, Rfl: 1    lidocaine (LIDODERM) 5 %, Place 1 patch on the skin as directed by provider Daily As Needed. Remove & Discard patch within 12 hours or as directed by MD, Disp: , Rfl:     metFORMIN (GLUCOPHAGE) 1000 MG tablet, Take 1 tablet by mouth 2 (Two) Times a Day With Meals. Indications: Type 2 Diabetes, Disp: , Rfl:     Multiple Vitamins-Minerals (CENTRUM SILVER PO), Take 1 tablet by mouth Daily. Indications: nutritional supplement, Disp: , Rfl:     O2 (OXYGEN), Inhale 1 L/min Daily As Needed. Indications: oxygen support, Disp: , Rfl:     pain patient supplied pump, Continuous. Morphine. Patient dose not know the setting, dose, or how much is delivered a day. Can give a bolus every 6 hours. Next refill 11.19.23. Dr. Marshall at [facility] is managing.  Indications: pain, Disp: , Rfl:     pramipexole (MIRAPEX) 0.125 MG tablet, Take 1 tablet by mouth 3 (Three) Times a Day. Indications: Restless Leg Syndrome, Disp: , Rfl:     pravastatin (Pravachol) 20 MG tablet, Take 20 mg by mouth Daily. Indications: High Amount of Fats in the Blood, Disp: , Rfl:     sennosides-docusate (PERICOLACE) 8.6-50 MG per tablet, Take 1 tablet by mouth Daily As Needed. Indications: Constipation, Disp: , Rfl:     sertraline (ZOLOFT) 100 MG tablet, Take 2 tablets by mouth Daily. Indications: Generalized Anxiety Disorder, Major Depressive Disorder, Disp: , Rfl:     tiZANidine (ZANAFLEX) 4 MG tablet, Take 1 tablet by mouth 3 (Three) Times a Day As Needed. Indications: Muscle Spasticity, Musculoskeletal Pain, Disp: , Rfl:     traZODone (DESYREL) 100 MG tablet, Take 2 tablets by mouth Every Night. Indications: Trouble Sleeping, Disp: , Rfl:     Procedures    PHQ-9 Total Score:      Objective     Vital Signs  /52 (BP Location:  "Left arm, Patient Position: Sitting, Cuff Size: Adult)   Pulse 60   Temp 98.6 °F (37 °C) (Temporal)   Ht 139.7 cm (55\")   Wt 53.5 kg (118 lb)   BMI 27.43 kg/m²   Estimated body mass index is 27.43 kg/m² as calculated from the following:    Height as of this encounter: 139.7 cm (55\").    Weight as of this encounter: 53.5 kg (118 lb).    BMI is >= 25 and <30. (Overweight) The following options were offered after discussion;: NONE      Physical Exam  Vitals and nursing note reviewed.   HENT:      Head: Normocephalic.   Cardiovascular:      Rate and Rhythm: Normal rate.   Pulmonary:      Effort: Pulmonary effort is normal.      Breath sounds: Normal breath sounds.   Skin:     General: Skin is warm and dry.          Neurological:      General: No focal deficit present.      Mental Status: She is alert and oriented to person, place, and time.   Psychiatric:         Mood and Affect: Mood normal.         Behavior: Behavior normal.         Thought Content: Thought content normal.         Judgment: Judgment normal.              Assessment and Plan     Assessment/Plan:  Diagnoses and all orders for this visit:    1. Skin rash (Primary)  -     doxycycline (VIBRAMYCIN) 100 MG capsule; Take 1 capsule by mouth 2 (Two) Times a Day for 10 days.  Dispense: 20 capsule; Refill: 0  -     Ambulatory Referral to Wound Clinic  I will send in a prescription for doxycycline twice a day for 10 days. I will put in a referral for her to go to a wound clinic to have that looked at as well.    2. Major depressive disorder, recurrent severe without psychotic features  -     busPIRone (BUSPAR) 7.5 MG tablet; Take 1 tablet by mouth 3 (Three) Times a Day. Indications: Anxiety Disorder, Major Depressive Disorder  Dispense: 270 tablet; Refill: 1    3. Essential (primary) hypertension  -     metoprolol tartrate (LOPRESSOR) 25 MG tablet; Take 1 tablet by mouth 2 (Two) Times a Day. Indications: High Blood Pressure Disorder  Dispense: 180 tablet; " Refill: 1  -     CBC & Differential; Future  -     Comprehensive Metabolic Panel; Future  -Check BP at home and report elevated values.    4. Anxiety  -Encouraged to participate in nonpharmacological interventions to decrease anxiety such as anxiety reducing apps and meditation.  -Patient states that anxiety and stress is significantly increased related to them staying with their daughter while they are having a house built.  The house is supposed to be completed however there have been some delays.  They are hopeful that shortly after [place] they will be into their own home and anxiety and stress will decrease from there.    5. Other fatigue  -     Vitamin B12; Future    6. Impaired cognition  -     Ambulatory Referral to Neurology    7. Double vision  -     Ambulatory Referral to Ophthalmology    8. Hyperlipidemia LDL goal <70  -     Lipid Panel; Future    9. Type 2 diabetes mellitus without complication, without long-term current use of insulin  -     Hemoglobin A1c; Future  -     Microalbumin / Creatinine Urine Ratio - Urine, Clean Catch; Future  She was encouraged to drink a bottle of water for every Mountain Dew she consumes. I will obtain blood work including lipid panel and hemoglobin A1c.    10. Encounter for hepatitis C screening test for low risk patient  -     Hepatitis C Antibody; Future    11. Encounter for vitamin deficiency screening  -     Vitamin D,25-Hydroxy; Future    12. Screening for endocrine disorder  -     TSH Rfx On Abnormal To Free T4; Future           She will follow up in 3 months.    There are no Patient Instructions on file for this visit.  Plan of care reviewed with patient at the conclusion of today's visit. Education was provided regarding diagnosis, management and any prescribed or recommended OTC medications.  Patient verbalizes understanding of and agreement with management plan.    Follow Up  Return in about 3 months (around 3/4/2024) for Recheck.    Delilah Becker, APRN      Transcribed from ambient dictation for GUILLERMO Zamudio by Geno Womack.  12/04/23   19:13 EST    Patient or patient representative verbalized consent to the visit recording.  I have personally performed the services described in this document as transcribed by the above individual, and it is both accurate and complete.

## 2023-12-05 ENCOUNTER — HOME CARE VISIT (OUTPATIENT)
Dept: HOME HEALTH SERVICES | Facility: HOME HEALTHCARE | Age: 68
End: 2023-12-05
Payer: COMMERCIAL

## 2023-12-05 ENCOUNTER — LAB (OUTPATIENT)
Dept: LAB | Facility: HOSPITAL | Age: 68
End: 2023-12-05
Payer: COMMERCIAL

## 2023-12-05 VITALS
HEART RATE: 65 BPM | SYSTOLIC BLOOD PRESSURE: 130 MMHG | RESPIRATION RATE: 18 BRPM | DIASTOLIC BLOOD PRESSURE: 64 MMHG | TEMPERATURE: 96.5 F | OXYGEN SATURATION: 99 %

## 2023-12-05 DIAGNOSIS — Z11.59 ENCOUNTER FOR HEPATITIS C SCREENING TEST FOR LOW RISK PATIENT: ICD-10-CM

## 2023-12-05 DIAGNOSIS — E78.5 HYPERLIPIDEMIA LDL GOAL <70: ICD-10-CM

## 2023-12-05 DIAGNOSIS — I10 ESSENTIAL (PRIMARY) HYPERTENSION: ICD-10-CM

## 2023-12-05 DIAGNOSIS — Z13.21 ENCOUNTER FOR VITAMIN DEFICIENCY SCREENING: ICD-10-CM

## 2023-12-05 DIAGNOSIS — R53.83 OTHER FATIGUE: ICD-10-CM

## 2023-12-05 DIAGNOSIS — E11.9 TYPE 2 DIABETES MELLITUS WITHOUT COMPLICATION, WITHOUT LONG-TERM CURRENT USE OF INSULIN: ICD-10-CM

## 2023-12-05 DIAGNOSIS — Z13.29 SCREENING FOR ENDOCRINE DISORDER: ICD-10-CM

## 2023-12-05 LAB
25(OH)D3 SERPL-MCNC: 56.7 NG/ML (ref 30–100)
ALBUMIN SERPL-MCNC: 3.6 G/DL (ref 3.5–5.2)
ALBUMIN UR-MCNC: <1.2 MG/DL
ALBUMIN/GLOB SERPL: 0.9 G/DL
ALP SERPL-CCNC: 122 U/L (ref 39–117)
ALT SERPL W P-5'-P-CCNC: 20 U/L (ref 1–33)
ANION GAP SERPL CALCULATED.3IONS-SCNC: 9.9 MMOL/L (ref 5–15)
AST SERPL-CCNC: 17 U/L (ref 1–32)
BASOPHILS # BLD AUTO: 0.03 10*3/MM3 (ref 0–0.2)
BASOPHILS NFR BLD AUTO: 0.3 % (ref 0–1.5)
BILIRUB SERPL-MCNC: 0.3 MG/DL (ref 0–1.2)
BUN SERPL-MCNC: 15 MG/DL (ref 8–23)
BUN/CREAT SERPL: 18.3 (ref 7–25)
CALCIUM SPEC-SCNC: 9.6 MG/DL (ref 8.6–10.5)
CHLORIDE SERPL-SCNC: 97 MMOL/L (ref 98–107)
CHOLEST SERPL-MCNC: 167 MG/DL (ref 0–200)
CO2 SERPL-SCNC: 30.1 MMOL/L (ref 22–29)
CREAT SERPL-MCNC: 0.82 MG/DL (ref 0.57–1)
CREAT UR-MCNC: 92.4 MG/DL
DEPRECATED RDW RBC AUTO: 38.5 FL (ref 37–54)
EGFRCR SERPLBLD CKD-EPI 2021: 78 ML/MIN/1.73
EOSINOPHIL # BLD AUTO: 0.09 10*3/MM3 (ref 0–0.4)
EOSINOPHIL NFR BLD AUTO: 1 % (ref 0.3–6.2)
ERYTHROCYTE [DISTWIDTH] IN BLOOD BY AUTOMATED COUNT: 12.7 % (ref 12.3–15.4)
GLOBULIN UR ELPH-MCNC: 4.2 GM/DL
GLUCOSE SERPL-MCNC: 112 MG/DL (ref 65–99)
HBA1C MFR BLD: 6.4 % (ref 4.8–5.6)
HCT VFR BLD AUTO: 32.2 % (ref 34–46.6)
HCV AB SER DONR QL: NORMAL
HDLC SERPL-MCNC: 44 MG/DL (ref 40–60)
HGB BLD-MCNC: 9.8 G/DL (ref 12–15.9)
IMM GRANULOCYTES # BLD AUTO: 0.03 10*3/MM3 (ref 0–0.05)
IMM GRANULOCYTES NFR BLD AUTO: 0.3 % (ref 0–0.5)
LDLC SERPL CALC-MCNC: 107 MG/DL (ref 0–100)
LDLC/HDLC SERPL: 2.41 {RATIO}
LYMPHOCYTES # BLD AUTO: 0.91 10*3/MM3 (ref 0.7–3.1)
LYMPHOCYTES NFR BLD AUTO: 10.2 % (ref 19.6–45.3)
MCH RBC QN AUTO: 26 PG (ref 26.6–33)
MCHC RBC AUTO-ENTMCNC: 30.4 G/DL (ref 31.5–35.7)
MCV RBC AUTO: 85.4 FL (ref 79–97)
MICROALBUMIN/CREAT UR: NORMAL MG/G{CREAT}
MONOCYTES # BLD AUTO: 0.45 10*3/MM3 (ref 0.1–0.9)
MONOCYTES NFR BLD AUTO: 5 % (ref 5–12)
NEUTROPHILS NFR BLD AUTO: 7.45 10*3/MM3 (ref 1.7–7)
NEUTROPHILS NFR BLD AUTO: 83.2 % (ref 42.7–76)
NRBC BLD AUTO-RTO: 0 /100 WBC (ref 0–0.2)
PLATELET # BLD AUTO: 390 10*3/MM3 (ref 140–450)
PMV BLD AUTO: 11.2 FL (ref 6–12)
POTASSIUM SERPL-SCNC: 4.3 MMOL/L (ref 3.5–5.2)
PROT SERPL-MCNC: 7.8 G/DL (ref 6–8.5)
RBC # BLD AUTO: 3.77 10*6/MM3 (ref 3.77–5.28)
SODIUM SERPL-SCNC: 137 MMOL/L (ref 136–145)
TRIGL SERPL-MCNC: 84 MG/DL (ref 0–150)
TSH SERPL DL<=0.05 MIU/L-ACNC: 2.11 UIU/ML (ref 0.27–4.2)
VIT B12 BLD-MCNC: 267 PG/ML (ref 211–946)
VLDLC SERPL-MCNC: 16 MG/DL (ref 5–40)
WBC NRBC COR # BLD AUTO: 8.96 10*3/MM3 (ref 3.4–10.8)

## 2023-12-05 PROCEDURE — 80061 LIPID PANEL: CPT

## 2023-12-05 PROCEDURE — G0151 HHCP-SERV OF PT,EA 15 MIN: HCPCS

## 2023-12-05 PROCEDURE — 82043 UR ALBUMIN QUANTITATIVE: CPT

## 2023-12-05 PROCEDURE — 82570 ASSAY OF URINE CREATININE: CPT

## 2023-12-05 PROCEDURE — 82607 VITAMIN B-12: CPT

## 2023-12-05 PROCEDURE — 82306 VITAMIN D 25 HYDROXY: CPT

## 2023-12-05 PROCEDURE — 83036 HEMOGLOBIN GLYCOSYLATED A1C: CPT

## 2023-12-05 PROCEDURE — 86803 HEPATITIS C AB TEST: CPT

## 2023-12-05 PROCEDURE — 80050 GENERAL HEALTH PANEL: CPT

## 2023-12-06 ENCOUNTER — TELEPHONE (OUTPATIENT)
Dept: INTERNAL MEDICINE | Facility: CLINIC | Age: 68
End: 2023-12-06
Payer: COMMERCIAL

## 2023-12-06 ENCOUNTER — HOME CARE VISIT (OUTPATIENT)
Dept: HOME HEALTH SERVICES | Facility: HOME HEALTHCARE | Age: 68
End: 2023-12-06
Payer: COMMERCIAL

## 2023-12-06 VITALS
OXYGEN SATURATION: 98 % | DIASTOLIC BLOOD PRESSURE: 62 MMHG | SYSTOLIC BLOOD PRESSURE: 98 MMHG | TEMPERATURE: 98.6 F | HEART RATE: 78 BPM | RESPIRATION RATE: 16 BRPM

## 2023-12-06 PROCEDURE — G0300 HHS/HOSPICE OF LPN EA 15 MIN: HCPCS

## 2023-12-06 NOTE — HOME HEALTH
Routine Visit Note: LPN    Skill/education provided: wound care. pt left lower leg noted weeping, macerated and painful to touch pt started on abs yesterday for cellulitis. cld pcp for new wound orders. calciumn alginate, kerkix, coban med honey change 2 time weekly    Patient/caregiver response: pt will elevate ble , pt will take all abx until completed and will call pcp if adverse reations are noted. v/s wdl.     Plan for next visit: pending orders for wound care.    Other pertinent info: supplies need to ordered

## 2023-12-06 NOTE — TELEPHONE ENCOUNTER
JONATHAN WITH [facility] REQUESTING AN ORDER FOR PATIENT WOUND CARE. SHE STATES PATIENT HAD POISON IVY BACK IN THE SUMMER AND STILL HASN'T HEALED. THE LOWER LEFT LEG AND ANKLE HAVE BECOME RED AND WEEPY.   THE ORDER SHE IS REQUESTING:    WASH WOUND WITH REGULAR SALINE  PAT DRY  APPLY CALCIUM ALIGINATE  WRAP WITH KERLIX  SECURE WITH COBAN  TO BE CHANGED 2X WEEKLY    PH# [phone] FAX# [phone]

## 2023-12-11 ENCOUNTER — HOME CARE VISIT (OUTPATIENT)
Dept: HOME HEALTH SERVICES | Facility: HOME HEALTHCARE | Age: 68
End: 2023-12-11
Payer: COMMERCIAL

## 2023-12-11 VITALS
RESPIRATION RATE: 16 BRPM | SYSTOLIC BLOOD PRESSURE: 108 MMHG | HEART RATE: 70 BPM | DIASTOLIC BLOOD PRESSURE: 60 MMHG | TEMPERATURE: 96.4 F | OXYGEN SATURATION: 94 %

## 2023-12-11 PROCEDURE — G0300 HHS/HOSPICE OF LPN EA 15 MIN: HCPCS

## 2023-12-11 NOTE — HOME HEALTH
Routine Visit Note: LPN    Skill/education provided: wound care/assessment. pulmonary. Lungs are CTA lobes 02 sats 94% on r/a no acute resp distress reported or noted, Wound care to left ankle performed and wound is looking better, you have the center wound and weeping blistering surround area. wound care performed pt tolerated procedure well. pt cont's with abx d/t cellulitis in left ankle

## 2023-12-12 ENCOUNTER — HOME CARE VISIT (OUTPATIENT)
Dept: HOME HEALTH SERVICES | Facility: HOME HEALTHCARE | Age: 68
End: 2023-12-12
Payer: COMMERCIAL

## 2023-12-12 VITALS
HEART RATE: 74 BPM | OXYGEN SATURATION: 94 % | DIASTOLIC BLOOD PRESSURE: 58 MMHG | RESPIRATION RATE: 18 BRPM | TEMPERATURE: 96.9 F | SYSTOLIC BLOOD PRESSURE: 106 MMHG

## 2023-12-12 PROCEDURE — G0151 HHCP-SERV OF PT,EA 15 MIN: HCPCS

## 2023-12-13 ENCOUNTER — OFFICE VISIT (OUTPATIENT)
Dept: NEUROLOGY | Facility: CLINIC | Age: 68
End: 2023-12-13
Payer: COMMERCIAL

## 2023-12-13 VITALS
BODY MASS INDEX: 26.77 KG/M2 | OXYGEN SATURATION: 94 % | DIASTOLIC BLOOD PRESSURE: 60 MMHG | HEART RATE: 73 BPM | HEIGHT: 56 IN | SYSTOLIC BLOOD PRESSURE: 138 MMHG | WEIGHT: 119 LBS

## 2023-12-13 DIAGNOSIS — R41.3 MEMORY LOSS: Primary | ICD-10-CM

## 2023-12-13 DIAGNOSIS — Z87.828 HISTORY OF TRAUMATIC SUBDURAL HEMATOMA: ICD-10-CM

## 2023-12-13 DIAGNOSIS — Z87.820 HISTORY OF MULTIPLE CONCUSSIONS: ICD-10-CM

## 2023-12-13 DIAGNOSIS — F33.2 MAJOR DEPRESSIVE DISORDER, RECURRENT SEVERE WITHOUT PSYCHOTIC FEATURES: Primary | ICD-10-CM

## 2023-12-13 DIAGNOSIS — E11.9 TYPE 2 DIABETES MELLITUS WITHOUT COMPLICATION, WITHOUT LONG-TERM CURRENT USE OF INSULIN: ICD-10-CM

## 2023-12-13 DIAGNOSIS — Z74.09 IMPAIRED FUNCTIONAL MOBILITY, BALANCE, GAIT, AND ENDURANCE: ICD-10-CM

## 2023-12-13 DIAGNOSIS — Z86.69 HISTORY OF SLEEP APNEA: ICD-10-CM

## 2023-12-13 DIAGNOSIS — G62.9 NEUROPATHY: Primary | ICD-10-CM

## 2023-12-13 DIAGNOSIS — H53.2 DIPLOPIA: ICD-10-CM

## 2023-12-13 DIAGNOSIS — G47.19 EXCESSIVE DAYTIME SLEEPINESS: ICD-10-CM

## 2023-12-13 DIAGNOSIS — F32.1 CURRENT MODERATE EPISODE OF MAJOR DEPRESSIVE DISORDER WITHOUT PRIOR EPISODE: ICD-10-CM

## 2023-12-13 PROBLEM — I26.99 PULMONARY EMBOLISM: Status: ACTIVE | Noted: 2023-12-13

## 2023-12-13 RX ORDER — GABAPENTIN 400 MG/1
400 CAPSULE ORAL 3 TIMES DAILY
Qty: 90 CAPSULE | Refills: 0 | Status: SHIPPED | OUTPATIENT
Start: 2023-12-13

## 2023-12-13 RX ORDER — SERTRALINE HYDROCHLORIDE 100 MG/1
200 TABLET, FILM COATED ORAL DAILY
Qty: 90 TABLET | Refills: 1 | Status: SHIPPED | OUTPATIENT
Start: 2023-12-13
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
## 2023-12-14 ENCOUNTER — HOME CARE VISIT (OUTPATIENT)
Dept: HOME HEALTH SERVICES | Facility: HOME HEALTHCARE | Age: 68
End: 2023-12-14
Payer: COMMERCIAL

## 2023-12-14 VITALS
TEMPERATURE: 97.5 F | SYSTOLIC BLOOD PRESSURE: 110 MMHG | HEART RATE: 55 BPM | OXYGEN SATURATION: 95 % | RESPIRATION RATE: 18 BRPM | DIASTOLIC BLOOD PRESSURE: 58 MMHG

## 2023-12-14 PROBLEM — F32.A DEPRESSIVE DISORDER: Status: ACTIVE | Noted: 2018-04-25

## 2023-12-14 PROBLEM — F41.1 GENERALIZED ANXIETY DISORDER: Status: ACTIVE | Noted: 2018-04-25

## 2023-12-14 PROBLEM — K55.9 VASCULAR DISORDER OF INTESTINE: Status: ACTIVE | Noted: 2017-07-25

## 2023-12-14 PROBLEM — M24.571 EQUINUS CONTRACTURE OF RIGHT ANKLE: Status: ACTIVE | Noted: 2023-12-14

## 2023-12-14 PROBLEM — M20.41 HAMMERTOE OF RIGHT FOOT: Status: ACTIVE | Noted: 2023-12-14

## 2023-12-14 PROBLEM — G25.81 RESTLESS LEGS: Status: ACTIVE | Noted: 2018-04-25

## 2023-12-14 PROBLEM — J44.9 CHRONIC OBSTRUCTIVE LUNG DISEASE: Status: ACTIVE | Noted: 2023-12-14

## 2023-12-14 PROBLEM — J30.9 ALLERGIC RHINITIS: Status: ACTIVE | Noted: 2018-04-25

## 2023-12-14 PROBLEM — G40.909 SEIZURE DISORDER: Status: ACTIVE | Noted: 2018-04-25

## 2023-12-14 PROBLEM — E11.42 TYPE 2 DIABETES MELLITUS WITH PERIPHERAL NEUROPATHY: Status: ACTIVE | Noted: 2023-12-14

## 2023-12-14 PROBLEM — I82.409 DEEP VENOUS THROMBOSIS: Status: ACTIVE | Noted: 2023-12-14

## 2023-12-14 PROBLEM — K21.9 GASTROESOPHAGEAL REFLUX DISEASE WITHOUT ESOPHAGITIS: Status: ACTIVE | Noted: 2018-04-25

## 2023-12-14 PROCEDURE — G0495 RN CARE TRAIN/EDU IN HH: HCPCS

## 2023-12-15 NOTE — HOME HEALTH
Routine Visit Note:    Skill/education provided: CP and NV assessment, med compliance and for med changes, pt has already changed dressing to lt ankle yesterday, stated it looks better, no longer red, has not check BG recently/instructed her to resume BG checks. No falls. Lungs clear with sat 95% on RA, pt uses oxygen as needed. Reviewed all meds with pt including actions and freq., to report any bleeding or excessive bruising to HH or pcp.    Patient/caregiver response: pt jackie assessment and stated understanding of teaching    Plan for next visit: told pt SN will need to see wound next visit to measure and take photo    Other pertinent info:

## 2023-12-18 ENCOUNTER — HOME CARE VISIT (OUTPATIENT)
Dept: HOME HEALTH SERVICES | Facility: HOME HEALTHCARE | Age: 68
End: 2023-12-18
Payer: COMMERCIAL

## 2023-12-18 VITALS
OXYGEN SATURATION: 95 % | TEMPERATURE: 96.6 F | DIASTOLIC BLOOD PRESSURE: 54 MMHG | HEART RATE: 50 BPM | RESPIRATION RATE: 18 BRPM | SYSTOLIC BLOOD PRESSURE: 88 MMHG

## 2023-12-18 PROCEDURE — G0151 HHCP-SERV OF PT,EA 15 MIN: HCPCS

## 2023-12-19 ENCOUNTER — HOME CARE VISIT (OUTPATIENT)
Dept: HOME HEALTH SERVICES | Facility: HOME HEALTHCARE | Age: 68
End: 2023-12-19
Payer: COMMERCIAL

## 2023-12-19 VITALS
SYSTOLIC BLOOD PRESSURE: 110 MMHG | TEMPERATURE: 96.7 F | RESPIRATION RATE: 20 BRPM | OXYGEN SATURATION: 96 % | HEART RATE: 60 BPM | DIASTOLIC BLOOD PRESSURE: 58 MMHG

## 2023-12-19 PROCEDURE — G0299 HHS/HOSPICE OF RN EA 15 MIN: HCPCS

## 2023-12-19 NOTE — HOME HEALTH
Routine Visit Note: LPN    Skill/education provided: wound care/assessment. pulmonary. Lungs are CTA lobes 02 sats 94% on r/a no acute resp distress reported or noted, Wound care to left ankle performed and wound is looking better. wound care performed pt tolerated procedure well. pt [id number]  with abx d/t cellulitis in left ankle

## 2023-12-21 ENCOUNTER — HOME CARE VISIT (OUTPATIENT)
Dept: HOME HEALTH SERVICES | Facility: HOME HEALTHCARE | Age: 68
End: 2023-12-21
Payer: COMMERCIAL

## 2023-12-21 VITALS
TEMPERATURE: 98.4 F | HEART RATE: 79 BPM | RESPIRATION RATE: 16 BRPM | DIASTOLIC BLOOD PRESSURE: 58 MMHG | SYSTOLIC BLOOD PRESSURE: 158 MMHG | OXYGEN SATURATION: 97 %

## 2023-12-21 PROCEDURE — G0153 HHCP-SVS OF S/L PATH,EA 15MN: HCPCS

## 2023-12-21 NOTE — Clinical Note
ST evaluation completed this week.  Pt. would like to participate in ST services to improve memory recall.  Pt. will need to be re-cert on/before 1/2/24 for ST to continue services with patient.

## 2023-12-22 ENCOUNTER — HOME CARE VISIT (OUTPATIENT)
Dept: HOME HEALTH SERVICES | Facility: HOME HEALTHCARE | Age: 68
End: 2023-12-22
Payer: COMMERCIAL

## 2023-12-22 PROCEDURE — G0299 HHS/HOSPICE OF RN EA 15 MIN: HCPCS

## 2023-12-22 NOTE — HOME HEALTH
"ST Eval Note:     Home Health ordered for: ST services secondary to memory loss    Reason for Hosp/Primary Dx/Co-morbidities: PMH is signficant for the following: shortness of breath, pedal edema, hx of CHF, hx of CAD, HTN, hyperlipidemia, COPD, acute UTI, elevated procalcitonin, DMII, hx of pulmonary embolism, chronic anticoagulation, major depressive disorder, multiple falls  .   Focus of Care: ST to focus on education and training of memory recall strategies to improve pt's ability to perform BADLs and IADLs with greater independence.     Patient's goal(s): \"to be able to use my phone.\"    Current Functional status/mobility/DME: Pt. ambulates without her walker/cane, although she reports the PT \"tells me every visit to use my walker.\"    HB status/Living Arrangements: Pt. lives with her  in a single story home with her daughter and son in law.  There are 6 dogs in the home.      Fall Risk/Safety concerns: Pt. is at high risk of falls and has had multiple falls in the home due to non-compliance with her AD.     Plan for next visit: Implementation of memory recall aides."

## 2023-12-24 VITALS
DIASTOLIC BLOOD PRESSURE: 68 MMHG | HEART RATE: 98 BPM | TEMPERATURE: 97.8 F | RESPIRATION RATE: 18 BRPM | SYSTOLIC BLOOD PRESSURE: 110 MMHG | OXYGEN SATURATION: 96 %

## 2023-12-24 NOTE — HOME HEALTH
Routine Visit Note: LPN    Skill/education provided: wound care/assessment. pulmonary. Lungs are CTA lobes 02 sats 94% on r/a no acute resp distress reported or noted, Wound care to left ankle performed and wound is looking better. wound care performed pt tolerated procedure well. d/t cellulitis in left ankle

## 2023-12-26 ENCOUNTER — APPOINTMENT (OUTPATIENT)
Dept: GENERAL RADIOLOGY | Facility: HOSPITAL | Age: 68
End: 2023-12-26
Payer: COMMERCIAL

## 2023-12-26 ENCOUNTER — HOSPITAL ENCOUNTER (INPATIENT)
Facility: HOSPITAL | Age: 68
LOS: 5 days | Discharge: HOME | End: 2023-12-31
Attending: EMERGENCY MEDICINE | Admitting: INTERNAL MEDICINE
Payer: COMMERCIAL

## 2023-12-26 ENCOUNTER — OFFICE VISIT (OUTPATIENT)
Dept: INTERNAL MEDICINE | Facility: CLINIC | Age: 68
End: 2023-12-26
Payer: COMMERCIAL

## 2023-12-26 ENCOUNTER — HOME CARE VISIT (OUTPATIENT)
Dept: HOME HEALTH SERVICES | Facility: HOME HEALTHCARE | Age: 68
End: 2023-12-26
Payer: COMMERCIAL

## 2023-12-26 VITALS
OXYGEN SATURATION: 93 % | SYSTOLIC BLOOD PRESSURE: 118 MMHG | WEIGHT: 114 LBS | HEART RATE: 60 BPM | HEIGHT: 56 IN | BODY MASS INDEX: 25.64 KG/M2 | DIASTOLIC BLOOD PRESSURE: 60 MMHG

## 2023-12-26 VITALS
DIASTOLIC BLOOD PRESSURE: 58 MMHG | HEART RATE: 66 BPM | TEMPERATURE: 96.8 F | RESPIRATION RATE: 18 BRPM | OXYGEN SATURATION: 97 % | SYSTOLIC BLOOD PRESSURE: 110 MMHG

## 2023-12-26 DIAGNOSIS — I25.119 CORONARY ARTERY DISEASE INVOLVING NATIVE CORONARY ARTERY OF NATIVE HEART WITH ANGINA PECTORIS: ICD-10-CM

## 2023-12-26 DIAGNOSIS — U07.1 COVID-19 VIRUS INFECTION: ICD-10-CM

## 2023-12-26 DIAGNOSIS — J44.9 CHRONIC OBSTRUCTIVE PULMONARY DISEASE, UNSPECIFIED COPD TYPE: ICD-10-CM

## 2023-12-26 DIAGNOSIS — R05.1 ACUTE COUGH: Primary | ICD-10-CM

## 2023-12-26 DIAGNOSIS — I10 ESSENTIAL (PRIMARY) HYPERTENSION: ICD-10-CM

## 2023-12-26 DIAGNOSIS — U07.1 PNEUMONIA DUE TO COVID-19 VIRUS: Primary | ICD-10-CM

## 2023-12-26 DIAGNOSIS — J12.82 PNEUMONIA DUE TO COVID-19 VIRUS: Primary | ICD-10-CM

## 2023-12-26 LAB
ALBUMIN SERPL-MCNC: 3.3 G/DL (ref 3.5–5.2)
ALBUMIN/GLOB SERPL: 0.9 G/DL
ALP SERPL-CCNC: 94 U/L (ref 39–117)
ALT SERPL W P-5'-P-CCNC: 13 U/L (ref 1–33)
ANION GAP SERPL CALCULATED.3IONS-SCNC: 10 MMOL/L (ref 5–15)
AST SERPL-CCNC: 19 U/L (ref 1–32)
BASOPHILS # BLD AUTO: 0.01 10*3/MM3 (ref 0–0.2)
BASOPHILS NFR BLD AUTO: 0.1 % (ref 0–1.5)
BILIRUB SERPL-MCNC: 0.2 MG/DL (ref 0–1.2)
BUN SERPL-MCNC: 24 MG/DL (ref 8–23)
BUN/CREAT SERPL: 23.5 (ref 7–25)
CALCIUM SPEC-SCNC: 9 MG/DL (ref 8.6–10.5)
CHLORIDE SERPL-SCNC: 102 MMOL/L (ref 98–107)
CO2 SERPL-SCNC: 28 MMOL/L (ref 22–29)
CREAT SERPL-MCNC: 1.02 MG/DL (ref 0.57–1)
CRP SERPL-MCNC: 8.42 MG/DL (ref 0–0.5)
D DIMER PPP FEU-MCNC: 1.57 MCGFEU/ML (ref 0–0.68)
D-LACTATE SERPL-SCNC: 0.9 MMOL/L (ref 0.5–2)
DEPRECATED RDW RBC AUTO: 46.9 FL (ref 37–54)
EGFRCR SERPLBLD CKD-EPI 2021: 60 ML/MIN/1.73
EOSINOPHIL # BLD AUTO: 0 10*3/MM3 (ref 0–0.4)
EOSINOPHIL NFR BLD AUTO: 0 % (ref 0.3–6.2)
ERYTHROCYTE [DISTWIDTH] IN BLOOD BY AUTOMATED COUNT: 14.6 % (ref 12.3–15.4)
FLUAV SUBTYP SPEC NAA+PROBE: NOT DETECTED
FLUBV RNA ISLT QL NAA+PROBE: NOT DETECTED
GLOBULIN UR ELPH-MCNC: 3.7 GM/DL
GLUCOSE SERPL-MCNC: 141 MG/DL (ref 65–99)
HCT VFR BLD AUTO: 34.7 % (ref 34–46.6)
HGB BLD-MCNC: 10.4 G/DL (ref 12–15.9)
HOLD SPECIMEN: NORMAL
IMM GRANULOCYTES # BLD AUTO: 0.03 10*3/MM3 (ref 0–0.05)
IMM GRANULOCYTES NFR BLD AUTO: 0.3 % (ref 0–0.5)
LYMPHOCYTES # BLD AUTO: 1.8 10*3/MM3 (ref 0.7–3.1)
LYMPHOCYTES NFR BLD AUTO: 16.8 % (ref 19.6–45.3)
MAGNESIUM SERPL-MCNC: 1.5 MG/DL (ref 1.6–2.4)
MCH RBC QN AUTO: 26.7 PG (ref 26.6–33)
MCHC RBC AUTO-ENTMCNC: 30 G/DL (ref 31.5–35.7)
MCV RBC AUTO: 89.2 FL (ref 79–97)
MONOCYTES # BLD AUTO: 0.47 10*3/MM3 (ref 0.1–0.9)
MONOCYTES NFR BLD AUTO: 4.4 % (ref 5–12)
NEUTROPHILS NFR BLD AUTO: 78.4 % (ref 42.7–76)
NEUTROPHILS NFR BLD AUTO: 8.42 10*3/MM3 (ref 1.7–7)
NRBC BLD AUTO-RTO: 0 /100 WBC (ref 0–0.2)
PLATELET # BLD AUTO: 258 10*3/MM3 (ref 140–450)
PMV BLD AUTO: 11.8 FL (ref 6–12)
POTASSIUM SERPL-SCNC: 4.1 MMOL/L (ref 3.5–5.2)
PROCALCITONIN SERPL-MCNC: 0.5 NG/ML (ref 0–0.25)
PROT SERPL-MCNC: 7 G/DL (ref 6–8.5)
RBC # BLD AUTO: 3.89 10*6/MM3 (ref 3.77–5.28)
SARS-COV-2 RNA RESP QL NAA+PROBE: DETECTED
SODIUM SERPL-SCNC: 140 MMOL/L (ref 136–145)
TROPONIN T SERPL HS-MCNC: 23 NG/L
WBC NRBC COR # BLD AUTO: 10.73 10*3/MM3 (ref 3.4–10.8)
WHOLE BLOOD HOLD COAG: NORMAL
WHOLE BLOOD HOLD SPECIMEN: NORMAL

## 2023-12-26 PROCEDURE — 84484 ASSAY OF TROPONIN QUANT: CPT

## 2023-12-26 PROCEDURE — 71045 X-RAY EXAM CHEST 1 VIEW: CPT

## 2023-12-26 PROCEDURE — 85025 COMPLETE CBC W/AUTO DIFF WBC: CPT

## 2023-12-26 PROCEDURE — 83880 ASSAY OF NATRIURETIC PEPTIDE: CPT | Performed by: STUDENT IN AN ORGANIZED HEALTH CARE EDUCATION/TRAINING PROGRAM

## 2023-12-26 PROCEDURE — 87428 SARSCOV & INF VIR A&B AG IA: CPT | Performed by: INTERNAL MEDICINE

## 2023-12-26 PROCEDURE — 99285 EMERGENCY DEPT VISIT HI MDM: CPT

## 2023-12-26 PROCEDURE — 80053 COMPREHEN METABOLIC PANEL: CPT

## 2023-12-26 PROCEDURE — 85379 FIBRIN DEGRADATION QUANT: CPT | Performed by: STUDENT IN AN ORGANIZED HEALTH CARE EDUCATION/TRAINING PROGRAM

## 2023-12-26 PROCEDURE — 86140 C-REACTIVE PROTEIN: CPT | Performed by: STUDENT IN AN ORGANIZED HEALTH CARE EDUCATION/TRAINING PROGRAM

## 2023-12-26 PROCEDURE — 83605 ASSAY OF LACTIC ACID: CPT | Performed by: EMERGENCY MEDICINE

## 2023-12-26 PROCEDURE — 83735 ASSAY OF MAGNESIUM: CPT

## 2023-12-26 PROCEDURE — 99214 OFFICE O/P EST MOD 30 MIN: CPT | Performed by: INTERNAL MEDICINE

## 2023-12-26 PROCEDURE — 63710000001 DEXAMETHASONE PER 0.25 MG: Performed by: STUDENT IN AN ORGANIZED HEALTH CARE EDUCATION/TRAINING PROGRAM

## 2023-12-26 PROCEDURE — G0495 RN CARE TRAIN/EDU IN HH: HCPCS

## 2023-12-26 PROCEDURE — 36415 COLL VENOUS BLD VENIPUNCTURE: CPT

## 2023-12-26 PROCEDURE — 84145 PROCALCITONIN (PCT): CPT | Performed by: EMERGENCY MEDICINE

## 2023-12-26 PROCEDURE — 87636 SARSCOV2 & INF A&B AMP PRB: CPT | Performed by: EMERGENCY MEDICINE

## 2023-12-26 PROCEDURE — 93005 ELECTROCARDIOGRAM TRACING: CPT

## 2023-12-26 RX ORDER — POLYETHYLENE GLYCOL 3350 17 G/17G
17 POWDER, FOR SOLUTION ORAL DAILY PRN
Status: DISCONTINUED | OUTPATIENT
Start: 2023-12-26 | End: 2023-12-31 | Stop reason: HOSPADM

## 2023-12-26 RX ORDER — ACETAMINOPHEN 500 MG
500 TABLET ORAL EVERY 6 HOURS PRN
Status: DISCONTINUED | OUTPATIENT
Start: 2023-12-26 | End: 2023-12-27 | Stop reason: SDUPTHER

## 2023-12-26 RX ORDER — MULTIPLE VITAMINS W/ MINERALS TAB 9MG-400MCG
1 TAB ORAL DAILY
Status: DISCONTINUED | OUTPATIENT
Start: 2023-12-27 | End: 2023-12-31 | Stop reason: HOSPADM

## 2023-12-26 RX ORDER — SODIUM CHLORIDE 9 MG/ML
40 INJECTION, SOLUTION INTRAVENOUS AS NEEDED
Status: DISCONTINUED | OUTPATIENT
Start: 2023-12-26 | End: 2023-12-31 | Stop reason: HOSPADM

## 2023-12-26 RX ORDER — ACETAMINOPHEN 325 MG/1
650 TABLET ORAL EVERY 4 HOURS PRN
Status: DISCONTINUED | OUTPATIENT
Start: 2023-12-26 | End: 2023-12-31 | Stop reason: HOSPADM

## 2023-12-26 RX ORDER — PIOGLITAZONEHYDROCHLORIDE 30 MG/1
1 TABLET ORAL DAILY
COMMUNITY
Start: 2023-12-26 | End: 2024-01-05 | Stop reason: SDUPTHER

## 2023-12-26 RX ORDER — SERTRALINE HYDROCHLORIDE 100 MG/1
200 TABLET, FILM COATED ORAL DAILY
Status: DISCONTINUED | OUTPATIENT
Start: 2023-12-27 | End: 2023-12-31 | Stop reason: HOSPADM

## 2023-12-26 RX ORDER — BISACODYL 5 MG/1
5 TABLET, DELAYED RELEASE ORAL DAILY PRN
Status: DISCONTINUED | OUTPATIENT
Start: 2023-12-26 | End: 2023-12-31 | Stop reason: HOSPADM

## 2023-12-26 RX ORDER — IPRATROPIUM BROMIDE AND ALBUTEROL SULFATE 2.5; .5 MG/3ML; MG/3ML
3 SOLUTION RESPIRATORY (INHALATION)
Status: DISCONTINUED | OUTPATIENT
Start: 2023-12-26 | End: 2023-12-27

## 2023-12-26 RX ORDER — PRAMIPEXOLE DIHYDROCHLORIDE 0.25 MG/1
0.12 TABLET ORAL NIGHTLY
Status: DISCONTINUED | OUTPATIENT
Start: 2023-12-27 | End: 2023-12-31 | Stop reason: HOSPADM

## 2023-12-26 RX ORDER — SODIUM CHLORIDE 0.9 % (FLUSH) 0.9 %
10 SYRINGE (ML) INJECTION AS NEEDED
Status: DISCONTINUED | OUTPATIENT
Start: 2023-12-26 | End: 2023-12-31 | Stop reason: HOSPADM

## 2023-12-26 RX ORDER — SODIUM CHLORIDE 0.9 % (FLUSH) 0.9 %
10 SYRINGE (ML) INJECTION EVERY 12 HOURS SCHEDULED
Status: DISCONTINUED | OUTPATIENT
Start: 2023-12-27 | End: 2023-12-31 | Stop reason: HOSPADM

## 2023-12-26 RX ORDER — BUDESONIDE AND FORMOTEROL FUMARATE DIHYDRATE 160; 4.5 UG/1; UG/1
2 AEROSOL RESPIRATORY (INHALATION)
Status: DISCONTINUED | OUTPATIENT
Start: 2023-12-27 | End: 2023-12-31 | Stop reason: HOSPADM

## 2023-12-26 RX ORDER — ASPIRIN 81 MG/1
81 TABLET, CHEWABLE ORAL DAILY
Status: DISCONTINUED | OUTPATIENT
Start: 2023-12-27 | End: 2023-12-31 | Stop reason: HOSPADM

## 2023-12-26 RX ORDER — BUSPIRONE HYDROCHLORIDE 15 MG/1
7.5 TABLET ORAL 3 TIMES DAILY
Status: DISCONTINUED | OUTPATIENT
Start: 2023-12-27 | End: 2023-12-31 | Stop reason: HOSPADM

## 2023-12-26 RX ORDER — DEXAMETHASONE SODIUM PHOSPHATE 4 MG/ML
6 INJECTION, SOLUTION INTRA-ARTICULAR; INTRALESIONAL; INTRAMUSCULAR; INTRAVENOUS; SOFT TISSUE DAILY
Status: DISCONTINUED | OUTPATIENT
Start: 2023-12-26 | End: 2023-12-31 | Stop reason: HOSPADM

## 2023-12-26 RX ORDER — PIOGLITAZONEHYDROCHLORIDE 15 MG/1
30 TABLET ORAL DAILY
Status: DISCONTINUED | OUTPATIENT
Start: 2023-12-27 | End: 2023-12-27

## 2023-12-26 RX ORDER — AMOXICILLIN 250 MG
2 CAPSULE ORAL 2 TIMES DAILY
Status: DISCONTINUED | OUTPATIENT
Start: 2023-12-27 | End: 2023-12-31 | Stop reason: HOSPADM

## 2023-12-26 RX ORDER — LANOLIN ALCOHOL/MO/W.PET/CERES
500 CREAM (GRAM) TOPICAL DAILY
Status: DISCONTINUED | OUTPATIENT
Start: 2023-12-27 | End: 2023-12-31 | Stop reason: HOSPADM

## 2023-12-26 RX ORDER — BISACODYL 10 MG
10 SUPPOSITORY, RECTAL RECTAL DAILY PRN
Status: DISCONTINUED | OUTPATIENT
Start: 2023-12-26 | End: 2023-12-31 | Stop reason: HOSPADM

## 2023-12-26 RX ORDER — GABAPENTIN 300 MG/1
300 CAPSULE ORAL 3 TIMES DAILY
Status: DISCONTINUED | OUTPATIENT
Start: 2023-12-27 | End: 2023-12-31 | Stop reason: HOSPADM

## 2023-12-26 RX ORDER — ACETAMINOPHEN 500 MG
500 TABLET ORAL EVERY 6 HOURS PRN
COMMUNITY

## 2023-12-26 RX ORDER — FAMOTIDINE 20 MG/1
20 TABLET, FILM COATED ORAL 2 TIMES DAILY
Status: DISCONTINUED | OUTPATIENT
Start: 2023-12-27 | End: 2023-12-28

## 2023-12-26 RX ORDER — TIZANIDINE 4 MG/1
4 TABLET ORAL 3 TIMES DAILY PRN
Status: DISCONTINUED | OUTPATIENT
Start: 2023-12-26 | End: 2023-12-31 | Stop reason: HOSPADM

## 2023-12-26 RX ORDER — PRAVASTATIN SODIUM 20 MG
20 TABLET ORAL NIGHTLY
Status: DISCONTINUED | OUTPATIENT
Start: 2023-12-27 | End: 2023-12-31 | Stop reason: HOSPADM

## 2023-12-26 RX ADMIN — DEXAMETHASONE 6 MG: 4 TABLET ORAL at 22:18

## 2023-12-26 NOTE — HOME HEALTH
Routine Visit Note:    Skill/education provided: CP assessment, lung sounds decreased on lt side, pt thinks she has pna.  Coughing up thick phlegm. Ankle wound with just a couple of small red spots, no drainage, advised pt she can leave BONNIE unless she prefers to cover it. O2 sat in the 80s without oxygen, placed pt on her oxygen per concentrator at 2 lpm, sat up to 96-97.  New on Vit B12 500 mcg qd chewable. Called pcp office and sched appt for pt with Dr Jackson at 4 pm due to resp symptoms, advised pt to keep oxygen in place.    Patient/caregiver response: pt jackie assessment without issues, stated she will go to MD appt and wear her oxygen.    Plan for next visit: CP assessment, any new med/ATB?    Other pertinent info:

## 2023-12-26 NOTE — PROGRESS NOTES
[facility]     Sheree Samuel  1955   [id number]      Patient Care Team:  Delilah Becker APRN as PCP - General (Family Medicine)  Macho Calabrese MD as Cardiologist (Cardiology)    Chief Complaint::   Chief Complaint   Patient presents with    Cough     X 3 days with productive cough.  Oxygen level this morning on RA was 83%        HPI  The patient is a 68-year-old female who presents for cough and congestion.     She has been sick for approximately 3 days. She has been experiencing a cough and congestion. Home health came today on 12/26/2023, and when she was doing the vitals, her pulse oximeter read 83 percent. She denies wearing her oxygen at the time. She is afraid to go into the hospital. She complains of dyspnea and tightness in her chest. She is not taking her nebulizers as she has been out of her nebulizers for approximately 3 months.    She is a diabetic. She has not checked her blood glucose lately. Her blood glucose was between 125 and 130 mg/dL.      Patient Active Problem List   Diagnosis    Coronary artery disease involving native coronary artery of native heart with angina pectoris    Type 2 diabetes mellitus    Chronic pain    Hyperlipidemia LDL goal <70    Localization-related symptomatic epilepsy and epileptic syndromes with simple partial seizures, not intractable, without status epilepticus    Unstable angina    Pneumonia    Arthritis due to other bacteria, unspecified joint    Chronic pulmonary embolism    Essential (primary) hypertension    Primary hypercoagulable state    Presence of unspecified artificial knee joint    Other specified abnormal findings of blood chemistry    Major depressive disorder, single episode, unspecified    Major depressive disorder, recurrent severe without psychotic features    Low back pain    Fall    Urinary tract infection, site not specified    SDH (subdural hematoma)    Memory loss    History of multiple concussions    History of  traumatic subdural hematoma    Diplopia    History of sleep apnea    Coronary arteriosclerosis in native artery    Chronic pain disorder    Fibromyalgia    Pulmonary embolism    Impaired functional mobility, balance, gait, and endurance    History of pulmonary embolus (PE)    Deep venous thrombosis    Constipation    Thromboembolism of vein    Depressive disorder    Displacement of cervical intervertebral disc    Equinus contracture of right ankle    Gastroesophageal reflux disease without esophagitis    Generalized anxiety disorder    Hammertoe of right foot    Closed fracture of neck of femur    Closed fracture of distal end of radius    Chronic obstructive lung disease    Chest injury    Bone necrosis    Blood coagulation disorder    Allergic rhinitis    Asthenia    Acute posthemorrhagic anemia    Long term current use of anticoagulant    Lymphedema praecox    Melanocytic nevus of trunk    Cervical stenosis of spinal canal    Restless legs    Seizure disorder    Senile hyperkeratosis    Tietze's disease    Urge incontinence of urine    Vascular disorder of intestine    Type 2 diabetes mellitus with peripheral neuropathy    Acute cough    COVID-19 virus infection        Past Medical History:   Diagnosis Date    Anxiety     Arthritis     ASHD (arteriosclerotic heart disease)     Cancer     Cervical disc disorder of mid-cervical region     Chronic pain     Depression     Depression     Diabetes mellitus     Heart disease     Hyperlipidemia     Hypertension     MRSA carrier     Myocardial infarction     Pulmonary embolism        Past Surgical History:   Procedure Laterality Date    BREAST EXCISIONAL BIOPSY Right     x 2    CARDIAC CATHETERIZATION N/A 8/22/2016    Procedure: Left Heart Cath with +/- CBI;  Surgeon: Bernard Palumbo MD;  Location: [facility] CATH INVASIVE LOCATION;  Service:     CARDIAC CATHETERIZATION Bilateral 7/12/2018    Procedure: Right and Left Heart Cath;  Surgeon: Seth Farnsworth MD;   Location: [facility] CATH INVASIVE LOCATION;  Service: Cardiovascular    CORONARY ARTERY BYPASS GRAFT      CORONARY ARTERY BYPASS GRAFT      HYSTERECTOMY      OOPHORECTOMY      PAIN PUMP INSERTION/REVISION      PAIN PUMP INSERTION/REVISION      REDUCTION MAMMAPLASTY      early 80's       Family History   Problem Relation Age of Onset    Stroke Mother     Alzheimer's disease Mother     Diabetic kidney disease Mother     Heart attack Father     Alzheimer's disease Maternal Grandmother     Breast cancer Maternal Grandmother     Cancer Daughter     Heart disease Daughter     Ovarian cancer Neg Hx        Social History     Socioeconomic History    Marital status:    Tobacco Use    Smoking status: Never    Smokeless tobacco: Never   Vaping Use    Vaping Use: Never used   Substance and Sexual Activity    Alcohol use: No    Drug use: No    Sexual activity: Not Currently       Allergies   Allergen Reactions    Lipitor [Atorvastatin] Other (See Comments)     Causes hair to fall out    Narcan [Naloxone Hcl] Other (See Comments)     Caused a heart attack    Neosporin [Neomycin-Bacitracin Zn-Polymyx] Other (See Comments)     Blisters    Penicillins Anaphylaxis    Abilify [Aripiprazole] Rash     Unknown reaction    Ceftin [Cefuroxime] Nausea And Vomiting    Duloxetine Hcl Unknown - Low Severity    Latex Unknown - Low Severity    Tape Unknown - High Severity    Adhesive Tape Rash    Flexeril [Cyclobenzaprine] Rash    Vioxx [Rofecoxib] Rash       Review of Systems     HEENT: Denies any hearing changes, eyesight changes, no headache or dizziness  NECK: Denies any pain, stiffness, swelling or dysphagia  CHEST: Complains of dyspnea  CARDIAC: Denies chest pain, pressure or palpitations  ABD: Denies nausea, vomiting or abdominal pain  : Denies dysuria  NEURO: Denies syncope, concussion, neuropathy  PSYCH: Denies any stress  EXTREM: Denies arthritic changes myalgia or arthralgia  SKIN: Denies any rash    Vital Signs  Vitals:     "12/26/23 1604   BP: 118/60   BP Location: Left arm   Patient Position: Sitting   Cuff Size: Adult   Pulse: 60   SpO2: 93%  Comment: on O2 at 2L   Weight: 51.7 kg (114 lb)   Height: 142.2 cm (56\")   PainSc: 0-No pain     Body mass index is 25.56 kg/m².        Advance Care Planning       No current facility-administered medications for this visit.    Current Outpatient Medications:     amLODIPine (NORVASC) 5 MG tablet, Take 1 tablet by mouth Daily. Indications: High Blood Pressure Disorder, Disp: , Rfl:     apixaban (ELIQUIS) 2.5 MG tablet tablet, Take 1 tablet by mouth Every 12 (Twelve) Hours. Indications: history of DVT/PE, Disp: 60 tablet, Rfl: 0    aspirin 81 MG chewable tablet, Chew 1 tablet Daily. Indications: Disease involving Lipid Deposits in the Arteries, Disp: , Rfl:     Blood Glucose Monitoring Suppl (Accu-Chek Guide Me) w/Device kit, USE TO CHECK GLUCOSE AS DIRECTED ONCE DAILY, Disp: , Rfl:     busPIRone (BUSPAR) 7.5 MG tablet, Take 1 tablet by mouth 3 (Three) Times a Day. Indications: Anxiety Disorder, Major Depressive Disorder, Disp: 270 tablet, Rfl: 1    Famotidine (PEPCID PO), Take 1 tablet by mouth 2 (Two) Times a Day. Indications: GERD, Disp: , Rfl:     gabapentin (NEURONTIN) 400 MG capsule, Take 1 capsule by mouth 3 (Three) Times a Day. Indications: Diabetes with Nerve Disease, Disp: 90 capsule, Rfl: 0    glucose blood test strip, Use to check blood sugars once daily, use strips covered by insurance., Disp: 100 each, Rfl: 12    Lancets misc, Use one daily to check blood sugars, use lancets covered by insurance., Disp: 100 each, Rfl: 1    lidocaine (LIDODERM) 5 %, Place 1 patch on the skin as directed by provider Daily As Needed. Remove & Discard patch within 12 hours or as directed by MD, Disp: , Rfl:     metFORMIN (GLUCOPHAGE) 1000 MG tablet, Take 1 tablet by mouth 2 (Two) Times a Day With Meals. Indications: Type 2 Diabetes, Disp: 180 tablet, Rfl: 1    metoprolol tartrate (LOPRESSOR) 25 MG " tablet, Take 1 tablet by mouth 2 (Two) Times a Day. Indications: High Blood Pressure Disorder, Disp: 180 tablet, Rfl: 1    Multiple Vitamins-Minerals (CENTRUM SILVER PO), Take 1 tablet by mouth Daily. Indications: nutritional supplement, Disp: , Rfl:     O2 (OXYGEN), Inhale 1 L/min Daily As Needed. Indications: oxygen support, Disp: , Rfl:     pain patient supplied pump, Continuous. Morphine. Patient dose not know the setting, dose, or how much is delivered a day. Can give a bolus every 6 hours. Next refill 11.19.23. Dr. Marshall at [facility] is managing.  Indications: pain, Disp: , Rfl:     pramipexole (MIRAPEX) 0.125 MG tablet, Take 1 tablet by mouth 3 (Three) Times a Day. Indications: Restless Leg Syndrome, Disp: , Rfl:     pravastatin (Pravachol) 20 MG tablet, Take 1 tablet by mouth Daily. Indications: High Amount of Fats in the Blood, Disp: , Rfl:     sennosides-docusate (PERICOLACE) 8.6-50 MG per tablet, Take 1 tablet by mouth Daily As Needed. Indications: Constipation, Disp: , Rfl:     sertraline (ZOLOFT) 100 MG tablet, Take 2 tablets by mouth Daily. Indications: Generalized Anxiety Disorder, Major Depressive Disorder, Disp: 90 tablet, Rfl: 1    tiZANidine (ZANAFLEX) 4 MG tablet, Take 1 tablet by mouth 3 (Three) Times a Day As Needed. Indications: Muscle Spasticity, Musculoskeletal Pain, Disp: , Rfl:     traZODone (DESYREL) 100 MG tablet, Take 2 tablets by mouth Every Night. Indications: Trouble Sleeping, Disp: , Rfl:     albuterol (PROVENTIL HFA;VENTOLIN HFA) 108 (90 Base) MCG/ACT inhaler, Inhale 2 puffs Every 4 (Four) Hours As Needed for Wheezing. Indications: Chronic Obstructive Lung Disease (Patient not taking: Reported on 12/26/2023), Disp: , Rfl:     Cyanocobalamin (Vitamin B 12) 500 MCG tablet, Take 500 mcg by mouth Daily., Disp: , Rfl:     Fluticasone-Salmeterol (ADVAIR/WIXELA) 250-50 MCG/ACT DISKUS, Inhale 1 puff Daily As Needed. Indications: Chronic Obstructive Lung Disease (Patient not taking:  Reported on 12/26/2023), Disp: , Rfl:     Facility-Administered Medications Ordered in Other Visits:     sodium chloride 0.9 % flush 10 mL, 10 mL, Intravenous, PRN, Emergency, Triage Protocol, MD    Physical Exam     ACE III MINI         HEENT: Pupils equal reactive ENT clear, no facial asymmetry, the pharynx is clear  NECK: No masses bruit or thyromegaly  CHEST: Clear without rales wheezes or rhonchi, 91 percent oxygen saturation on 3 L a minute was 83% on room air severe kyphosis  CARDIAC: Regular rhythm without gallop rub or click, blood pressure 120/55 mmHg and 120/65 mmHg, heart rate stable  ABD: Liver spleen normal, good bowel sounds, nontender  : Deferred  NEURO: Intact  PSYCH: Normal  EXTREM: No significant arthritic changes, no edema  SKIN: Clear     Results Review:    Recent Results (from the past 672 hour(s))   Comprehensive Metabolic Panel    Collection Time: 12/05/23 10:10 AM    Specimen: Blood   Result Value Ref Range    Glucose 112 (H) 65 - 99 mg/dL    BUN 15 8 - 23 mg/dL    Creatinine 0.82 0.57 - 1.00 mg/dL    Sodium 137 136 - 145 mmol/L    Potassium 4.3 3.5 - 5.2 mmol/L    Chloride 97 (L) 98 - 107 mmol/L    CO2 30.1 (H) 22.0 - 29.0 mmol/L    Calcium 9.6 8.6 - 10.5 mg/dL    Total Protein 7.8 6.0 - 8.5 g/dL    Albumin 3.6 3.5 - 5.2 g/dL    ALT (SGPT) 20 1 - 33 U/L    AST (SGOT) 17 1 - 32 U/L    Alkaline Phosphatase 122 (H) 39 - 117 U/L    Total Bilirubin 0.3 0.0 - 1.2 mg/dL    Globulin 4.2 gm/dL    A/G Ratio 0.9 g/dL    BUN/Creatinine Ratio 18.3 7.0 - 25.0    Anion Gap 9.9 5.0 - 15.0 mmol/L    eGFR 78.0 >60.0 mL/min/1.73   Lipid Panel    Collection Time: 12/05/23 10:10 AM    Specimen: Blood   Result Value Ref Range    Total Cholesterol 167 0 - 200 mg/dL    Triglycerides 84 0 - 150 mg/dL    HDL Cholesterol 44 40 - 60 mg/dL    LDL Cholesterol  107 (H) 0 - 100 mg/dL    VLDL Cholesterol 16 5 - 40 mg/dL    LDL/HDL Ratio 2.41    TSH Rfx On Abnormal To Free T4    Collection Time: 12/05/23 10:10 AM     Specimen: Blood   Result Value Ref Range    TSH 2.110 0.270 - 4.200 uIU/mL   Vitamin D,25-Hydroxy    Collection Time: 12/05/23 10:10 AM    Specimen: Blood   Result Value Ref Range    25 Hydroxy, Vitamin D 56.7 30.0 - 100.0 ng/ml   Hemoglobin A1c    Collection Time: 12/05/23 10:10 AM    Specimen: Blood   Result Value Ref Range    Hemoglobin A1C 6.40 (H) 4.80 - 5.60 %   Hepatitis C Antibody    Collection Time: 12/05/23 10:10 AM    Specimen: Blood   Result Value Ref Range    Hepatitis C Ab Non-Reactive Non-Reactive   Microalbumin / Creatinine Urine Ratio - Urine, Clean Catch    Collection Time: 12/05/23 10:10 AM    Specimen: Urine, Clean Catch   Result Value Ref Range    Microalbumin/Creatinine Ratio      Creatinine, Urine 92.4 mg/dL    Microalbumin, Urine <1.2 mg/dL   Vitamin B12    Collection Time: 12/05/23 10:10 AM    Specimen: Blood   Result Value Ref Range    Vitamin B-12 267 211 - 946 pg/mL   CBC Auto Differential    Collection Time: 12/05/23 10:10 AM    Specimen: Blood   Result Value Ref Range    WBC 8.96 3.40 - 10.80 10*3/mm3    RBC 3.77 3.77 - 5.28 10*6/mm3    Hemoglobin 9.8 (L) 12.0 - 15.9 g/dL    Hematocrit 32.2 (L) 34.0 - 46.6 %    MCV 85.4 79.0 - 97.0 fL    MCH 26.0 (L) 26.6 - 33.0 pg    MCHC 30.4 (L) 31.5 - 35.7 g/dL    RDW 12.7 12.3 - 15.4 %    RDW-SD 38.5 37.0 - 54.0 fl    MPV 11.2 6.0 - 12.0 fL    Platelets 390 140 - 450 10*3/mm3    Neutrophil % 83.2 (H) 42.7 - 76.0 %    Lymphocyte % 10.2 (L) 19.6 - 45.3 %    Monocyte % 5.0 5.0 - 12.0 %    Eosinophil % 1.0 0.3 - 6.2 %    Basophil % 0.3 0.0 - 1.5 %    Immature Grans % 0.3 0.0 - 0.5 %    Neutrophils, Absolute 7.45 (H) 1.70 - 7.00 10*3/mm3    Lymphocytes, Absolute 0.91 0.70 - 3.10 10*3/mm3    Monocytes, Absolute 0.45 0.10 - 0.90 10*3/mm3    Eosinophils, Absolute 0.09 0.00 - 0.40 10*3/mm3    Basophils, Absolute 0.03 0.00 - 0.20 10*3/mm3    Immature Grans, Absolute 0.03 0.00 - 0.05 10*3/mm3    nRBC 0.0 0.0 - 0.2 /100 WBC   Lavender Top    Collection Time:  12/26/23  6:45 PM   Result Value Ref Range    Extra Tube hold for add-on    Light Blue Top    Collection Time: 12/26/23  6:45 PM   Result Value Ref Range    Extra Tube Hold for add-ons.    CBC Auto Differential    Collection Time: 12/26/23  6:45 PM    Specimen: Blood   Result Value Ref Range    WBC 10.73 3.40 - 10.80 10*3/mm3    RBC 3.89 3.77 - 5.28 10*6/mm3    Hemoglobin 10.4 (L) 12.0 - 15.9 g/dL    Hematocrit 34.7 34.0 - 46.6 %    MCV 89.2 79.0 - 97.0 fL    MCH 26.7 26.6 - 33.0 pg    MCHC 30.0 (L) 31.5 - 35.7 g/dL    RDW 14.6 12.3 - 15.4 %    RDW-SD 46.9 37.0 - 54.0 fl    MPV 11.8 6.0 - 12.0 fL    Platelets 258 140 - 450 10*3/mm3    Neutrophil % 78.4 (H) 42.7 - 76.0 %    Lymphocyte % 16.8 (L) 19.6 - 45.3 %    Monocyte % 4.4 (L) 5.0 - 12.0 %    Eosinophil % 0.0 (L) 0.3 - 6.2 %    Basophil % 0.1 0.0 - 1.5 %    Immature Grans % 0.3 0.0 - 0.5 %    Neutrophils, Absolute 8.42 (H) 1.70 - 7.00 10*3/mm3    Lymphocytes, Absolute 1.80 0.70 - 3.10 10*3/mm3    Monocytes, Absolute 0.47 0.10 - 0.90 10*3/mm3    Eosinophils, Absolute 0.00 0.00 - 0.40 10*3/mm3    Basophils, Absolute 0.01 0.00 - 0.20 10*3/mm3    Immature Grans, Absolute 0.03 0.00 - 0.05 10*3/mm3    nRBC 0.0 0.0 - 0.2 /100 WBC   Lactic Acid, Plasma    Collection Time: 12/26/23  6:45 PM    Specimen: Blood   Result Value Ref Range    Lactate 0.9 0.5 - 2.0 mmol/L   ECG 12 Lead ED Triage Standing Order; Weak / Dizzy / AMS    Collection Time: 12/26/23  6:57 PM   Result Value Ref Range    QT Interval 414 ms    QTC Interval 406 ms     Procedures POC COVID-19 positive, negative influenza A and influenza B    Medication Review: Medications reviewed and noted    Patient wellness counseling  Exercise: Wheelchair-bound or using a cane for assistance  Diet: Recommend healthy cardiac diabetic diet  Screening: Positive for COVID-19 POC negative for influenza A and influenza B  Social History     Socioeconomic History    Marital status:    Tobacco Use    Smoking status: Never     Smokeless tobacco: Never   Vaping Use    Vaping Use: Never used   Substance and Sexual Activity    Alcohol use: No    Drug use: No    Sexual activity: Not Currently        Assessment/Plan:    Problem List Items Addressed This Visit          Cardiac and Vasculature    Coronary artery disease involving native coronary artery of native heart with angina pectoris    Overview     Coronary artery bypass surgery by Dr. Darion Corbin (12/28/2011): Sequential LIMA to diagonal/LAD, SVG to left posterior lateral branch, SVG to nondominant RCA.  Stress test reportedly normal per patient, February 2016  [facility] admission for biomarker negative for recurrent chest pain  Cardiac catheterization (8/22/16): Severe 2 vessel coronary disease involving the mid LAD and nondominant right coronary artery.  3/3 grafts patent (LIMA to diagonal/LAD, SVG to left posterior branch, SVG to nondominant RCA).  SVG to RCA is atretic due to small distal vessel.  Normal LVEF.  Medical therapy recommended.  [facility] ER presentation with chest pain rule out, 4/1/17         Relevant Medications    amLODIPine (NORVASC) 5 MG tablet    metoprolol tartrate (LOPRESSOR) 25 MG tablet    Essential (primary) hypertension    Relevant Medications    amLODIPine (NORVASC) 5 MG tablet    metoprolol tartrate (LOPRESSOR) 25 MG tablet       Pulmonary and Pneumonias    Chronic obstructive lung disease    Relevant Medications    albuterol (PROVENTIL HFA;VENTOLIN HFA) 108 (90 Base) MCG/ACT inhaler    Fluticasone-Salmeterol (ADVAIR/WIXELA) 250-50 MCG/ACT DISKUS       Other    Acute cough - Primary    Overview     Acute cough for approximately 3 days with low O2 saturation of 83% at home on room air was advised to be seen in the office O2 saturation 83% room air 91% on 3 L a minute with distant breath sounds and a positive COVID-19 test negative flu AMB         Current Assessment & Plan     Patient is advised to go to [facility] emergency room  for the COVID-19 as she has coronary disease bypass grafting diabetes type 2 and COPD with end-stage lung disease.         Relevant Orders    POCT SARS-CoV-2 + Flu Antigen RONIT    COVID-19 virus infection    Overview     Patient not felt well for approximately 3 days she has not been utilizing her nebulizers she does not use her oxygen on a regular basis she was seen by home health this day with O2 saturation of 83% and was advised office visit.  The patient states she has had a cough congestion and shortness of breath patient not been utilizing her nebulizers for the last 3 months.  Patient has distant breath sounds, I do not hear wheezing, blood pressure 118/60 and heart rate is 60 O2 saturation of oxygen is 83% room air and 91% on 3 L a minute the patient had POC COVID-19 and influenza A and influenza B and she had positive reaction to COVID testing  The patient is referred to [facility] ER with COVID infection and history of severe COPD coronary disease essential hypertension diabetes type 2.               Patient Instructions   POC COVID-19 is positive, influenza A and influenza B are negative  Patient advised to proceed to [facility] emergency room  To take all medications with him to the emergency room on O2 at 3 L a minute     Plan of care reviewed with patient at the conclusion of today's visit. Education was provided regarding diagnosis, management, and any prescribed or recommended OTC medications.Patient verbalizes understanding of and agreement with management plan.         Angel Jackson MD      Note: Part of this note may be an electronic transcription/translation of spoken language to printed text using the Dragon Dictation system.    Transcribed from ambient dictation for Angel Jackson MD by Latasha Gar.  12/26/23   17:22 EST    Patient or patient representative verbalized consent to the visit recording.  I have personally performed the services described in this document as transcribed by the above  individual, and it is both accurate and complete.

## 2023-12-26 NOTE — Clinical Note
Level of Care: Telemetry [5]   Diagnosis: COVID [[id number]]   Admitting Physician: SONJA HO [[id number]]   Attending Physician: SONJA HO [[id number]]

## 2023-12-26 NOTE — ASSESSMENT & PLAN NOTE
Patient is advised to go to [facility] emergency room for the COVID-19 as she has coronary disease bypass grafting diabetes type 2 and COPD with end-stage lung disease.

## 2023-12-26 NOTE — PATIENT INSTRUCTIONS
POC COVID-19 is positive, influenza A and influenza B are negative  Patient advised to proceed to [facility] emergency room  To take all medications with him to the emergency room on O2 at 3 L a minute

## 2023-12-27 ENCOUNTER — HOME CARE VISIT (OUTPATIENT)
Dept: HOME HEALTH SERVICES | Facility: HOME HEALTHCARE | Age: 68
End: 2023-12-27
Payer: COMMERCIAL

## 2023-12-27 ENCOUNTER — APPOINTMENT (OUTPATIENT)
Dept: CT IMAGING | Facility: HOSPITAL | Age: 68
End: 2023-12-27
Payer: COMMERCIAL

## 2023-12-27 ENCOUNTER — DOCUMENTATION (OUTPATIENT)
Dept: HOME HEALTH SERVICES | Facility: HOME HEALTHCARE | Age: 68
End: 2023-12-27
Payer: COMMERCIAL

## 2023-12-27 LAB
ALBUMIN SERPL-MCNC: 2.7 G/DL (ref 3.5–5.2)
ALBUMIN/GLOB SERPL: 0.8 G/DL
ALP SERPL-CCNC: 83 U/L (ref 39–117)
ALT SERPL W P-5'-P-CCNC: 11 U/L (ref 1–33)
ANION GAP SERPL CALCULATED.3IONS-SCNC: 8 MMOL/L (ref 5–15)
AST SERPL-CCNC: 16 U/L (ref 1–32)
BACTERIA UR QL AUTO: ABNORMAL /HPF
BILIRUB SERPL-MCNC: <0.2 MG/DL (ref 0–1.2)
BILIRUB UR QL STRIP: NEGATIVE
BUN SERPL-MCNC: 23 MG/DL (ref 8–23)
BUN/CREAT SERPL: 28 (ref 7–25)
CALCIUM SPEC-SCNC: 8.5 MG/DL (ref 8.6–10.5)
CHLORIDE SERPL-SCNC: 102 MMOL/L (ref 98–107)
CLARITY UR: ABNORMAL
CO2 SERPL-SCNC: 27 MMOL/L (ref 22–29)
COLOR UR: YELLOW
CREAT SERPL-MCNC: 0.82 MG/DL (ref 0.57–1)
CRP SERPL-MCNC: 7.6 MG/DL (ref 0–0.5)
DEPRECATED RDW RBC AUTO: 46.5 FL (ref 37–54)
EGFRCR SERPLBLD CKD-EPI 2021: 78 ML/MIN/1.73
ERYTHROCYTE [DISTWIDTH] IN BLOOD BY AUTOMATED COUNT: 14.5 % (ref 12.3–15.4)
EXPIRATION DATE: ABNORMAL
FLUAV AG UPPER RESP QL IA.RAPID: NOT DETECTED
FLUBV AG UPPER RESP QL IA.RAPID: NOT DETECTED
GLOBULIN UR ELPH-MCNC: 3.4 GM/DL
GLUCOSE BLDC GLUCOMTR-MCNC: 127 MG/DL (ref 70–130)
GLUCOSE BLDC GLUCOMTR-MCNC: 130 MG/DL (ref 70–130)
GLUCOSE BLDC GLUCOMTR-MCNC: 136 MG/DL (ref 70–130)
GLUCOSE BLDC GLUCOMTR-MCNC: 138 MG/DL (ref 70–130)
GLUCOSE SERPL-MCNC: 196 MG/DL (ref 65–99)
GLUCOSE UR STRIP-MCNC: NEGATIVE MG/DL
HCT VFR BLD AUTO: 28.8 % (ref 34–46.6)
HGB BLD-MCNC: 8.8 G/DL (ref 12–15.9)
HGB UR QL STRIP.AUTO: NEGATIVE
HYALINE CASTS UR QL AUTO: ABNORMAL /LPF
INTERNAL CONTROL: ABNORMAL
KETONES UR QL STRIP: NEGATIVE
LEUKOCYTE ESTERASE UR QL STRIP.AUTO: ABNORMAL
Lab: ABNORMAL
MAGNESIUM SERPL-MCNC: 2.7 MG/DL (ref 1.6–2.4)
MCH RBC QN AUTO: 26.8 PG (ref 26.6–33)
MCHC RBC AUTO-ENTMCNC: 30.6 G/DL (ref 31.5–35.7)
MCV RBC AUTO: 87.8 FL (ref 79–97)
NITRITE UR QL STRIP: POSITIVE
NT-PROBNP SERPL-MCNC: 529.2 PG/ML (ref 0–900)
PH UR STRIP.AUTO: 8 [PH] (ref 5–8)
PLATELET # BLD AUTO: 221 10*3/MM3 (ref 140–450)
PMV BLD AUTO: 11.7 FL (ref 6–12)
POTASSIUM SERPL-SCNC: 4.4 MMOL/L (ref 3.5–5.2)
PROT SERPL-MCNC: 6.1 G/DL (ref 6–8.5)
PROT UR QL STRIP: ABNORMAL
RBC # BLD AUTO: 3.28 10*6/MM3 (ref 3.77–5.28)
RBC # UR STRIP: ABNORMAL /HPF
REF LAB TEST METHOD: ABNORMAL
SARS-COV-2 AG UPPER RESP QL IA.RAPID: DETECTED
SODIUM SERPL-SCNC: 137 MMOL/L (ref 136–145)
SP GR UR STRIP: 1.02 (ref 1–1.03)
SQUAMOUS #/AREA URNS HPF: ABNORMAL /HPF
UROBILINOGEN UR QL STRIP: ABNORMAL
WBC # UR STRIP: ABNORMAL /HPF
WBC NRBC COR # BLD AUTO: 5.54 10*3/MM3 (ref 3.4–10.8)

## 2023-12-27 PROCEDURE — 94799 UNLISTED PULMONARY SVC/PX: CPT

## 2023-12-27 PROCEDURE — 85027 COMPLETE CBC AUTOMATED: CPT | Performed by: STUDENT IN AN ORGANIZED HEALTH CARE EDUCATION/TRAINING PROGRAM

## 2023-12-27 PROCEDURE — 83735 ASSAY OF MAGNESIUM: CPT | Performed by: STUDENT IN AN ORGANIZED HEALTH CARE EDUCATION/TRAINING PROGRAM

## 2023-12-27 PROCEDURE — XW033E5 INTRODUCTION OF REMDESIVIR ANTI-INFECTIVE INTO PERIPHERAL VEIN, PERCUTANEOUS APPROACH, NEW TECHNOLOGY GROUP 5: ICD-10-PCS | Performed by: INTERNAL MEDICINE

## 2023-12-27 PROCEDURE — 80053 COMPREHEN METABOLIC PANEL: CPT | Performed by: STUDENT IN AN ORGANIZED HEALTH CARE EDUCATION/TRAINING PROGRAM

## 2023-12-27 PROCEDURE — 81001 URINALYSIS AUTO W/SCOPE: CPT | Performed by: EMERGENCY MEDICINE

## 2023-12-27 PROCEDURE — 25010000002 REMDESIVIR 100 MG/20ML SOLUTION 1 EACH VIAL

## 2023-12-27 PROCEDURE — 25010000002 MAGNESIUM SULFATE 2 GM/50ML SOLUTION: Performed by: STUDENT IN AN ORGANIZED HEALTH CARE EDUCATION/TRAINING PROGRAM

## 2023-12-27 PROCEDURE — 97162 PT EVAL MOD COMPLEX 30 MIN: CPT

## 2023-12-27 PROCEDURE — 63710000001 INSULIN LISPRO (HUMAN) PER 5 UNITS: Performed by: STUDENT IN AN ORGANIZED HEALTH CARE EDUCATION/TRAINING PROGRAM

## 2023-12-27 PROCEDURE — 99222 1ST HOSP IP/OBS MODERATE 55: CPT | Performed by: STUDENT IN AN ORGANIZED HEALTH CARE EDUCATION/TRAINING PROGRAM

## 2023-12-27 PROCEDURE — 25810000003 SODIUM CHLORIDE 0.9 % SOLUTION 250 ML FLEX CONT

## 2023-12-27 PROCEDURE — 82948 REAGENT STRIP/BLOOD GLUCOSE: CPT

## 2023-12-27 PROCEDURE — 97166 OT EVAL MOD COMPLEX 45 MIN: CPT

## 2023-12-27 PROCEDURE — 97530 THERAPEUTIC ACTIVITIES: CPT

## 2023-12-27 PROCEDURE — 86140 C-REACTIVE PROTEIN: CPT | Performed by: INTERNAL MEDICINE

## 2023-12-27 RX ORDER — MAGNESIUM SULFATE HEPTAHYDRATE 40 MG/ML
2 INJECTION, SOLUTION INTRAVENOUS
Status: COMPLETED | OUTPATIENT
Start: 2023-12-27 | End: 2023-12-27

## 2023-12-27 RX ORDER — NICOTINE POLACRILEX 4 MG
15 LOZENGE BUCCAL
Status: DISCONTINUED | OUTPATIENT
Start: 2023-12-27 | End: 2023-12-31 | Stop reason: HOSPADM

## 2023-12-27 RX ORDER — ALBUTEROL SULFATE 90 UG/1
2 AEROSOL, METERED RESPIRATORY (INHALATION)
Status: DISCONTINUED | OUTPATIENT
Start: 2023-12-27 | End: 2023-12-31 | Stop reason: HOSPADM

## 2023-12-27 RX ORDER — DEXTROSE MONOHYDRATE 25 G/50ML
25 INJECTION, SOLUTION INTRAVENOUS
Status: DISCONTINUED | OUTPATIENT
Start: 2023-12-27 | End: 2023-12-31 | Stop reason: HOSPADM

## 2023-12-27 RX ORDER — INSULIN LISPRO 100 [IU]/ML
2-7 INJECTION, SOLUTION INTRAVENOUS; SUBCUTANEOUS
Status: DISCONTINUED | OUTPATIENT
Start: 2023-12-27 | End: 2023-12-31 | Stop reason: HOSPADM

## 2023-12-27 RX ORDER — IBUPROFEN 600 MG/1
1 TABLET ORAL
Status: DISCONTINUED | OUTPATIENT
Start: 2023-12-27 | End: 2023-12-31 | Stop reason: HOSPADM

## 2023-12-27 RX ADMIN — ASPIRIN 81 MG: 81 TABLET, CHEWABLE ORAL at 09:15

## 2023-12-27 RX ADMIN — ALBUTEROL SULFATE 2 PUFF: 90 AEROSOL, METERED RESPIRATORY (INHALATION) at 20:50

## 2023-12-27 RX ADMIN — SERTRALINE HYDROCHLORIDE 200 MG: 100 TABLET ORAL at 09:15

## 2023-12-27 RX ADMIN — BUSPIRONE HYDROCHLORIDE 7.5 MG: 15 TABLET ORAL at 22:09

## 2023-12-27 RX ADMIN — Medication 10 ML: at 00:20

## 2023-12-27 RX ADMIN — BUDESONIDE AND FORMOTEROL FUMARATE DIHYDRATE 2 PUFF: 160; 4.5 AEROSOL RESPIRATORY (INHALATION) at 09:01

## 2023-12-27 RX ADMIN — MAGNESIUM SULFATE IN WATER FOR 2 G: 40 INJECTION INTRAVENOUS at 03:41

## 2023-12-27 RX ADMIN — SENNOSIDES AND DOCUSATE SODIUM 2 TABLET: 8.6; 5 TABLET ORAL at 00:20

## 2023-12-27 RX ADMIN — FAMOTIDINE 20 MG: 20 TABLET, FILM COATED ORAL at 09:20

## 2023-12-27 RX ADMIN — ALBUTEROL SULFATE 2 PUFF: 90 AEROSOL, METERED RESPIRATORY (INHALATION) at 13:05

## 2023-12-27 RX ADMIN — ALBUTEROL SULFATE 2 PUFF: 90 AEROSOL, METERED RESPIRATORY (INHALATION) at 09:01

## 2023-12-27 RX ADMIN — SENNOSIDES AND DOCUSATE SODIUM 2 TABLET: 8.6; 5 TABLET ORAL at 11:03

## 2023-12-27 RX ADMIN — GABAPENTIN 300 MG: 300 CAPSULE ORAL at 22:09

## 2023-12-27 RX ADMIN — GABAPENTIN 300 MG: 300 CAPSULE ORAL at 15:30

## 2023-12-27 RX ADMIN — Medication 10 ML: at 09:16

## 2023-12-27 RX ADMIN — BUSPIRONE HYDROCHLORIDE 7.5 MG: 15 TABLET ORAL at 09:15

## 2023-12-27 RX ADMIN — Medication 1 TABLET: at 09:15

## 2023-12-27 RX ADMIN — MAGNESIUM SULFATE IN WATER FOR 2 G: 40 INJECTION INTRAVENOUS at 05:56

## 2023-12-27 RX ADMIN — DOXYCYCLINE 100 MG: 100 INJECTION, POWDER, LYOPHILIZED, FOR SOLUTION INTRAVENOUS at 01:29

## 2023-12-27 RX ADMIN — MAGNESIUM SULFATE IN WATER FOR 2 G: 40 INJECTION INTRAVENOUS at 01:28

## 2023-12-27 RX ADMIN — FAMOTIDINE 20 MG: 20 TABLET, FILM COATED ORAL at 00:20

## 2023-12-27 RX ADMIN — PRAMIPEXOLE DIHYDROCHLORIDE 0.12 MG: 0.25 TABLET ORAL at 22:10

## 2023-12-27 RX ADMIN — BUSPIRONE HYDROCHLORIDE 7.5 MG: 15 TABLET ORAL at 15:30

## 2023-12-27 RX ADMIN — PRAVASTATIN SODIUM 20 MG: 20 TABLET ORAL at 00:20

## 2023-12-27 RX ADMIN — Medication 10 ML: at 22:09

## 2023-12-27 RX ADMIN — APIXABAN 2.5 MG: 2.5 TABLET, FILM COATED ORAL at 22:09

## 2023-12-27 RX ADMIN — APIXABAN 2.5 MG: 2.5 TABLET, FILM COATED ORAL at 00:20

## 2023-12-27 RX ADMIN — TRAZODONE HYDROCHLORIDE 150 MG: 100 TABLET ORAL at 00:20

## 2023-12-27 RX ADMIN — CYANOCOBALAMIN TAB 1000 MCG 500 MCG: 1000 TAB at 09:15

## 2023-12-27 RX ADMIN — GABAPENTIN 300 MG: 300 CAPSULE ORAL at 09:15

## 2023-12-27 RX ADMIN — DOXYCYCLINE 100 MG: 100 INJECTION, POWDER, LYOPHILIZED, FOR SOLUTION INTRAVENOUS at 15:25

## 2023-12-27 RX ADMIN — TRAZODONE HYDROCHLORIDE 150 MG: 100 TABLET ORAL at 22:19

## 2023-12-27 RX ADMIN — BUDESONIDE AND FORMOTEROL FUMARATE DIHYDRATE 2 PUFF: 160; 4.5 AEROSOL RESPIRATORY (INHALATION) at 20:50

## 2023-12-27 RX ADMIN — APIXABAN 2.5 MG: 2.5 TABLET, FILM COATED ORAL at 09:20

## 2023-12-27 RX ADMIN — PRAVASTATIN SODIUM 20 MG: 20 TABLET ORAL at 22:19

## 2023-12-27 RX ADMIN — REMDESIVIR 200 MG: 100 INJECTION, POWDER, LYOPHILIZED, FOR SOLUTION INTRAVENOUS at 00:17

## 2023-12-27 RX ADMIN — FAMOTIDINE 20 MG: 20 TABLET, FILM COATED ORAL at 22:09

## 2023-12-27 RX ADMIN — REMDESIVIR 100 MG: 100 INJECTION, POWDER, LYOPHILIZED, FOR SOLUTION INTRAVENOUS at 22:19
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
## 2023-12-27 NOTE — PROGRESS NOTES
"                  Clinical Nutrition   Nutrition Support Assessment  Reason for Visit: MST score 2+, Reduced oral intake, \"Unsure\" unintentional weight loss      Patient Name: Sheree Samuel  YOB: 1955  MRN: [id number]  Date of Encounter: 12/27/23 14:58 EST  Admission date: 12/26/2023    Comments:    Ordered Boost BID; encourage po intake.  Added soft to chew modifier 2/2 reported difficulty chewing.   Will complete NFPE as able.    Nutrition Assessment   Admission Diagnosis:  COVID [U07.1]      Problem List:    COVID    Coronary artery disease involving native coronary artery of native heart with angina pectoris    Type 2 diabetes mellitus    Chronic pain    Hyperlipidemia LDL goal <70    Chronic pulmonary embolism    Type 2 diabetes mellitus with peripheral neuropathy    COVID-19 virus infection        PMH:   She  has a past medical history of Anxiety, Arthritis, ASHD (arteriosclerotic heart disease), Cancer, Cervical disc disorder of mid-cervical region, Chronic pain, Depression, Depression, Diabetes mellitus, Heart disease, Hyperlipidemia, Hypertension, MRSA carrier, Myocardial infarction, and Pulmonary embolism.    PSH:  She  has a past surgical history that includes Coronary artery bypass graft; Coronary artery bypass graft; Pain Pump Insertion/Revision; Cardiac catheterization (N/A, 8/22/2016); Pain Pump Insertion/Revision; Hysterectomy; Oophorectomy; Breast excisional biopsy (Right); Cardiac catheterization (Bilateral, 7/12/2018); and Reduction mammaplasty.    Applicable Nutrition Concerns:   Skin:  Oral: chewing difficulty, does not wear dentures  GI:    Applicable Interval History:         Reported/Observed/Food/Nutrition Related History:     Pt reports decreased po intake x 3 months 2/2 lack of hunger. Pt also notes she does not drive anymore so if something sounds good to eat she isn't able to go get it. Pt states she cooks at home and sometimes /dtr helps too. Pt notes progressive " "wt loss, UBW =160lbs. Pt also reports some difficulty chewing and is okay w/soft to chew modifier. States is able to place orders per kitchen. Pt agreeable to [facility]. [facility].     Anthropometrics     Flowsheet Rows      Flowsheet Row First Filed Value   Admission Height 142.2 cm (56\") Documented at 12/26/2023 1706   Admission Weight 51.7 kg (114 lb) Documented at 12/26/2023 1706              Height: Height: 142.2 cm (56\")  Last Filed Weight: Weight: 51.7 kg (114 lb) (12/26/23 2256)  Method: Weight Method: Standing scale  BMI: BMI (Calculated): 25.6  BMI classification: Overweight: 25.0-29.9kg/m2   IBW:  44kg    UBW:  160lbs (historically) per pt  Weight change:      Weight       Weight (kg) Weight (lbs) Weight Method Visit Report   7/12/2018 65.3 kg  143 lb 15.4 oz      10/25/2023 55.792 kg  123 lb  Stated      55.792 kg  123 lb  Stated     10/26/2023 55.792 kg  123 lb  Stated      55.792 kg  123 lb       53.887 kg  118 lb 12.8 oz      11/2/2023 53.524 kg  118 lb   --    11/4/2023 52.164 kg  115 lb      12/4/2023 53.524 kg  118 lb   --    12/13/2023 53.978 kg  119 lb   --    12/26/2023 51.71 kg  114 lb  Standing scale  --     51.71 kg  114 lb  Stated  --     51.71 kg  114 lb   --        Nutrition Focused Physical Exam     Date:     12/27    Unable to perform exam due to: COVID Isolation .    Current Nutrition Prescription   PO: Diet: Diabetic Diets; Consistent Carbohydrate; Texture: Regular Texture (IDDSI 7); Fluid Consistency: Thin (IDDSI 0)  Oral Nutrition Supplement:   Intake: Insufficient data      Nutrition Diagnosis   Date: 12/27             Updated:    Problem Predicted suboptimal energy intake   Etiology Loss of appetite, limited access (pt doesn't drive)   Signs/Symptoms Per pt report   Status:     Date:  12/27 Updated:  Problem Unintended weight loss   Etiology Energy intake < energy needs 2/2 COPD   Signs/Symptoms Progressive wt loss x ~5 yrs (PNM=[id number])   Status:    Goal:   General: Nutrition to support " treatment  PO: Establish PO, Increase intake  EN/PN: N/A    Nutrition Intervention      Follow treatment progress, Care plan reviewed, Encourage intake, Supplement provided    Boost (gilberto) BID    Monitoring/Evaluation:   Per protocol, PO intake, Supplement intake      Radha Salmeron MS,RD,LD  Time Spent: 20

## 2023-12-27 NOTE — ED NOTES
Sheree Samuel    Nursing Report ED to Floor:  Mental status: a&ox3  Ambulatory status: with assist  Oxygen Therapy:  2l nc  Cardiac Rhythm: nsr to sinus arpit  Admitted from: ed  Safety Concerns:  na  Social Issues: na  ED Room #:  10    ED Nurse Phone Extension - [phone] or may call [phone].      HPI:   Chief Complaint   Patient presents with    URI       Past Medical History:  Past Medical History:   Diagnosis Date    Anxiety     Arthritis     ASHD (arteriosclerotic heart disease)     Cancer     Cervical disc disorder of mid-cervical region     Chronic pain     Depression     Depression     Diabetes mellitus     Heart disease     Hyperlipidemia     Hypertension     MRSA carrier     Myocardial infarction     Pulmonary embolism         Past Surgical History:  Past Surgical History:   Procedure Laterality Date    BREAST EXCISIONAL BIOPSY Right     x 2    CARDIAC CATHETERIZATION N/A 8/22/2016    Procedure: Left Heart Cath with +/- CBI;  Surgeon: Bernard Palumbo MD;  Location:  [facility];  Service:     CARDIAC CATHETERIZATION Bilateral 7/12/2018    Procedure: Right and Left Heart Cath;  Surgeon: Seth Farnsworth MD;  Location:  [facility];  Service: Cardiovascular    CORONARY ARTERY BYPASS GRAFT      CORONARY ARTERY BYPASS GRAFT      HYSTERECTOMY      OOPHORECTOMY      PAIN PUMP INSERTION/REVISION      PAIN PUMP INSERTION/REVISION      REDUCTION MAMMAPLASTY      early 80's        Admitting Doctor:   Angel Bryant MD    Consulting Provider(s):  Consults       No orders found from 11/27/2023 to 12/27/2023.             Admitting Diagnosis:   The encounter diagnosis was Pneumonia due to COVID-19 virus.    Most Recent Vitals:   Vitals:    12/26/23 1706 12/26/23 2115 12/26/23 2158   BP: 126/52 108/57 130/58   BP Location: Left arm     Patient Position: Sitting     Pulse: 62 52 53   Resp: 17     Temp: 98.2 °F (36.8 °C)     TempSrc: Oral     SpO2: 97% 100% 100%   Weight: 51.7  "kg (114 lb)     Height: 142.2 cm (56\")         Active LDAs/IV Access:   Lines, Drains & Airways       Active LDAs       Name Placement date Placement time Site Days    Peripheral IV 12/26/23 1846 Left Antecubital 12/26/23 1846  Antecubital  less than 1                    Labs (abnormal labs have a star):   Labs Reviewed   COMPREHENSIVE METABOLIC PANEL - Abnormal; Notable for the following components:       Result Value    Glucose 141 (*)     BUN 24 (*)     Creatinine 1.02 (*)     Albumin 3.3 (*)     eGFR 60.0 (*)     All other components within normal limits    Narrative:     GFR Normal >60  Chronic Kidney Disease <60  Kidney Failure <15     SINGLE HSTROPONIN T - Abnormal; Notable for the following components:    HS Troponin T 23 (*)     All other components within normal limits    Narrative:     High Sensitive Troponin T Reference Range:  <14.0 ng/L- Negative Female for AMI  <22.0 ng/L- Negative Male for AMI  >=14 - Abnormal Female indicating possible myocardial injury.  >=22 - Abnormal Male indicating possible myocardial injury.   Clinicians would have to utilize clinical acumen, EKG, Troponin, and serial changes to determine if it is an Acute Myocardial Infarction or myocardial injury due to an underlying chronic condition.        MAGNESIUM - Abnormal; Notable for the following components:    Magnesium 1.5 (*)     All other components within normal limits   CBC WITH AUTO DIFFERENTIAL - Abnormal; Notable for the following components:    Hemoglobin 10.4 (*)     MCHC 30.0 (*)     Neutrophil % 78.4 (*)     Lymphocyte % 16.8 (*)     Monocyte % 4.4 (*)     Eosinophil % 0.0 (*)     Neutrophils, Absolute 8.42 (*)     All other components within normal limits   PROCALCITONIN - Abnormal; Notable for the following components:    Procalcitonin 0.50 (*)     All other components within normal limits    Narrative:     As a Marker for Sepsis (Non-Neonates):    1. <0.5 ng/mL represents a low risk of severe sepsis and/or septic " "shock.  2. >2 ng/mL represents a high risk of severe sepsis and/or septic shock.    As a Marker for Lower Respiratory Tract Infections that require antibiotic therapy:    PCT on Admission    Antibiotic Therapy       6-12 Hrs later    >0.5                Strongly Recommended  >0.25 - <0.5        Recommended   0.1 - 0.25          Discouraged              Remeasure/reassess PCT  <0.1                Strongly Discouraged     Remeasure/reassess PCT    As 28 day mortality risk marker: \"Change in Procalcitonin Result\" (>80% or <=80%) if Day 0 (or Day 1) and Day 4 values are available. Refer to [email or web address]    Change in PCT <=80%  A decrease of PCT levels below or equal to 80% defines a positive change in PCT test result representing a higher risk for 28-day all-cause mortality of patients diagnosed with severe sepsis for septic shock.    Change in PCT >80%  A decrease of PCT levels of more than 80% defines a negative change in PCT result representing a lower risk for 28-day all-cause mortality of patients diagnosed with severe sepsis or septic shock.      LACTIC ACID, PLASMA - Normal   COVID PRE-OP / PRE-PROCEDURE SCREENING ORDER (NO ISOLATION)    Narrative:     The following orders were created for panel order COVID PRE-OP / PRE-PROCEDURE SCREENING ORDER (NO ISOLATION) - Swab, Nasal Cavity.  Procedure                               Abnormality         Status                     ---------                               -----------         ------                     COVID-19 and FLU A/B PCR...[[id number]]                      In process                   Please view results for these tests on the individual orders.   COVID-19 AND FLU A/B, NP SWAB IN TRANSPORT MEDIA 1 HR TAT   RAINBOW DRAW    Narrative:     The following orders were created for panel order [place] Draw.  Procedure                               Abnormality         Status                     ---------                               " -----------         ------                     Green Top (Gel)[[id number]]                                  Final result               Lavender Top[[id number]]                                     Final result               Gold Top - SST[[id number]]                                   Final result               Gray Top[[id number]]                                         In process                 Light Blue Top[[id number]]                                   Final result                 Please view results for these tests on the individual orders.   URINALYSIS W/ MICROSCOPIC IF INDICATED (NO CULTURE)   COMPREHENSIVE METABOLIC PANEL   C-REACTIVE PROTEIN   D-DIMER, QUANTITATIVE   CBC AND DIFFERENTIAL    Narrative:     The following orders were created for panel order CBC & Differential.  Procedure                               Abnormality         Status                     ---------                               -----------         ------                     CBC Auto Differential[[id number]]        Abnormal            Final result                 Please view results for these tests on the individual orders.   GREEN TOP   LAVENDER TOP   GOLD TOP - SST   LIGHT BLUE TOP   GRAY TOP       Meds Given in ED:   Medications   sodium chloride 0.9 % flush 10 mL (has no administration in time range)   ipratropium-albuterol (DUO-NEB) nebulizer solution 3 mL (has no administration in time range)   Pharmacy Consult - Remdesivir for Severe COVID-19 (Within 7 days of symptom onset) (has no administration in time range)   dexAMETHasone (DECADRON) tablet 6 mg (has no administration in time range)     Or   dexAMETHasone (DECADRON) injection 6 mg (has no administration in time range)     Pharmacy Consult - Remdesivir,

## 2023-12-27 NOTE — THERAPY EVALUATION
Patient Name: Sheree Samuel  : 1955    MRN: [id number]                              Today's Date: 2023       Admit Date: 2023    Visit Dx:     ICD-10-CM ICD-9-CM   1. Pneumonia due to COVID-19 virus  U07.1 480.8    J12.82 079.89     Patient Active Problem List   Diagnosis    Coronary artery disease involving native coronary artery of native heart with angina pectoris    Type 2 diabetes mellitus    Chronic pain    Hyperlipidemia LDL goal <70    Localization-related symptomatic epilepsy and epileptic syndromes with simple partial seizures, not intractable, without status epilepticus    Unstable angina    Pneumonia    Arthritis due to other bacteria, unspecified joint    Chronic pulmonary embolism    Essential (primary) hypertension    Primary hypercoagulable state    Presence of unspecified artificial knee joint    Other specified abnormal findings of blood chemistry    Major depressive disorder, single episode, unspecified    Major depressive disorder, recurrent severe without psychotic features    Low back pain    Fall    Urinary tract infection, site not specified    SDH (subdural hematoma)    Memory loss    History of multiple concussions    History of traumatic subdural hematoma    Diplopia    History of sleep apnea    Coronary arteriosclerosis in native artery    Chronic pain disorder    Fibromyalgia    Pulmonary embolism    Impaired functional mobility, balance, gait, and endurance    History of pulmonary embolus (PE)    Deep venous thrombosis    Constipation    Thromboembolism of vein    Depressive disorder    Displacement of cervical intervertebral disc    Equinus contracture of right ankle    Gastroesophageal reflux disease without esophagitis    Generalized anxiety disorder    Hammertoe of right foot    Closed fracture of neck of femur    Closed fracture of distal end of radius    Chronic obstructive lung disease    Chest injury    Bone necrosis    Blood coagulation disorder    Allergic  rhinitis    Asthenia    Acute posthemorrhagic anemia    Long term current use of anticoagulant    Lymphedema praecox    Melanocytic nevus of trunk    Cervical stenosis of spinal canal    Restless legs    Seizure disorder    Senile hyperkeratosis    Tietze's disease    Urge incontinence of urine    Vascular disorder of intestine    Type 2 diabetes mellitus with peripheral neuropathy    Acute cough    COVID-19 virus infection    COVID     Past Medical History:   Diagnosis Date    Anxiety     Arthritis     ASHD (arteriosclerotic heart disease)     Cancer     Cervical disc disorder of mid-cervical region     Chronic pain     Depression     Depression     Diabetes mellitus     Heart disease     Hyperlipidemia     Hypertension     MRSA carrier     Myocardial infarction     Pulmonary embolism      Past Surgical History:   Procedure Laterality Date    BREAST EXCISIONAL BIOPSY Right     x 2    CARDIAC CATHETERIZATION N/A 8/22/2016    Procedure: Left Heart Cath with +/- CBI;  Surgeon: Bernard Palumbo MD;  Location:  [facility];  Service:     CARDIAC CATHETERIZATION Bilateral 7/12/2018    Procedure: Right and Left Heart Cath;  Surgeon: Seth Farnsworth MD;  Location:  [facility];  Service: Cardiovascular    CORONARY ARTERY BYPASS GRAFT      CORONARY ARTERY BYPASS GRAFT      HYSTERECTOMY      OOPHORECTOMY      PAIN PUMP INSERTION/REVISION      PAIN PUMP INSERTION/REVISION      REDUCTION MAMMAPLASTY      early 80's      General Information       Row Name 12/27/23 1045          Physical Therapy Time and Intention    Document Type evaluation  -ML     Mode of Treatment physical therapy  -ML       Row Name 12/27/23 1045          General Information    Patient Profile Reviewed yes  -ML     Prior Level of Function independent:;all household mobility;gait;transfer;bed mobility;ADL's;dependent:;cooking;cleaning  patient ambulates with RW or SPC, navigates community distances with  electric WC  -ML     Existing Precautions/Restrictions fall;oxygen therapy device and L/min  -ML     Barriers to Rehab medically complex;previous functional deficit  -ML       Row Name 12/27/23 1045          Living Environment    People in Home child(barbara), adult;spouse  -ML       Row Name 12/27/23 1045          Home Main Entrance    Number of Stairs, Main Entrance none  -ML       Row Name 12/27/23 1045          Stairs Within Home, Primary    Number of Stairs, Within Home, Primary none  -ML       Row Name 12/27/23 1045          Cognition    Orientation Status (Cognition) oriented x 4  -ML       Row Name 12/27/23 1045          Safety Issues, Functional Mobility    Safety Issues Affecting Function (Mobility) safety precaution awareness  -ML     Impairments Affecting Function (Mobility) balance;endurance/activity tolerance;strength;postural/trunk control;pain  -ML               User Key  (r) = Recorded By, (t) = Taken By, (c) = Cosigned By      Initials Name Provider Type    ML Dari Fischer Physical Therapist                   Mobility       Row Name 12/27/23 1046          Bed Mobility    Bed Mobility supine-sit  -ML     Supine-Sit [place] (Bed Mobility) standby assist  -ML     Assistive Device (Bed Mobility) bed rails;head of bed elevated  -ML       Row Name 12/27/23 1046          Sit-Stand Transfer    Sit-Stand [place] (Transfers) standby assist  -ML     Assistive Device (Sit-Stand Transfers) walker, front-wheeled  -ML       Row Name 12/27/23 1046          Gait/Stairs (Locomotion)    [place] Level (Gait) contact guard  -ML     Assistive Device (Gait) walker, front-wheeled  -ML     Distance in Feet (Gait) 10 + 50  -ML     Deviations/Abnormal Patterns (Gait) bilateral deviations;kita decreased;gait speed decreased  -ML     Bilateral Gait Deviations forward flexed posture;heel strike decreased  -ML               User Key  (r) = Recorded By, (t) = Taken By, (c) = Cosigned By      Initials Name Provider  Type    ML Dari Fischer Physical Therapist                   Obj/Interventions       Row Name 12/27/23 1047          Range of Motion Comprehensive    General Range of Motion bilateral lower extremity ROM WFL  -ML       Row Name 12/27/23 1047          Strength Comprehensive (MMT)    General Manual Muscle Testing (MMT) Assessment lower extremity strength deficits identified  -ML     Comment, General Manual Muscle Testing (MMT) Assessment BLE grossly 4/5  -ML       Row Name 12/27/23 1047          Balance    Balance Assessment sitting static balance;sitting dynamic balance;sit to stand dynamic balance;standing static balance;standing dynamic balance  -ML     Static Sitting Balance independent  -ML     Dynamic Sitting Balance independent  -ML     Position, Sitting Balance unsupported;sitting edge of bed;other (see comments)  sitting on toilet  -ML     Sit to Stand Dynamic Balance standby assist  -ML     Static Standing Balance standby assist  -ML     Dynamic Standing Balance contact guard  -ML     Position/Device Used, Standing Balance supported;walker, front-wheeled  -ML     Balance Interventions sitting;standing;sit to stand;supported;occupation based/functional task  -ML       Row Name 12/27/23 1047          Sensory Assessment (Somatosensory)    Sensory Assessment (Somatosensory) LE sensation intact  -ML               User Key  (r) = Recorded By, (t) = Taken By, (c) = Cosigned By      Initials Name Provider Type    ML Dari Fischer Physical Therapist                   Goals/Plan       Row Name 12/27/23 1054          Bed Mobility Goal 1 (PT)    Activity/Assistive Device (Bed Mobility Goal 1, PT) sit to supine/supine to sit  -ML     [place] Level/Cues Needed (Bed Mobility Goal 1, PT) independent  -ML     Time Frame (Bed Mobility Goal 1, PT) 10 days  -ML       Row Name 12/27/23 1054          Transfer Goal 1 (PT)    Activity/Assistive Device (Transfer Goal 1, PT)  sit-to-stand/stand-to-sit;bed-to-chair/chair-to-bed;walker, rolling;cane, straight  -ML     [place] Level/Cues Needed (Transfer Goal 1, PT) modified independence  -ML     Time Frame (Transfer Goal 1, PT) 10 days  -ML       Row Name 12/27/23 1054          Gait Training Goal 1 (PT)    Activity/Assistive Device (Gait Training Goal 1, PT) gait (walking locomotion);decrease fall risk;improve balance and speed;increase endurance/gait distance;cane, straight;walker, rolling  -ML     [place] Level (Gait Training Goal 1, PT) modified independence  -ML     Distance (Gait Training Goal 1, PT) 100  -ML     Time Frame (Gait Training Goal 1, PT) 10 days  -ML       Row Name 12/27/23 1054          Therapy Assessment/Plan (PT)    Planned Therapy Interventions (PT) balance training;bed mobility training;gait training;home exercise program;neuromuscular re-education;patient/family education;postural re-education;ROM (range of motion);strengthening;stretching;transfer training  -ML               User Key  (r) = Recorded By, (t) = Taken By, (c) = Cosigned By      Initials Name Provider Type    ML Dari Fischer Physical Therapist                   Clinical Impression       Row Name 12/27/23 1047          Pain    Pretreatment Pain Rating 6/10  -ML     Posttreatment Pain Rating 6/10  -ML     Pain Location generalized  -ML     Pre/Posttreatment Pain Comment patient reports chronic BLE/back pain  -ML     Pain Intervention(s) Repositioned;Ambulation/increased activity  -ML       Row Name 12/27/23 1047          Plan of Care Review    Plan of Care Reviewed With patient  -ML     Outcome Evaluation Patient required CGA to ambulate in room with RW, patient without increased work of breathing during ambulation. The patient currently presents below baseline for mobility and would continue to benefit from skilled PT to address strength, balance and activity tolerance deficits.  -ML       Row Name 12/27/23 1047          Therapy  Assessment/Plan (PT)    Rehab Potential (PT) good, to achieve stated therapy goals  -ML     Criteria for Skilled Interventions Met (PT) yes;meets criteria;skilled treatment is necessary  -ML     Therapy Frequency (PT) daily  -ML       Row Name 12/27/23 1047          Vital Signs    Pre SpO2 (%) 100  -ML     O2 Delivery Pre Treatment nasal cannula  -ML     Post SpO2 (%) 100  -ML     O2 Delivery Post Treatment nasal cannula  -ML     Pre Patient Position Supine  -ML     Intra Patient Position Standing  -ML     Post Patient Position Sitting  -ML       Row Name 12/27/23 1047          Positioning and Restraints    Pre-Treatment Position in bed  -ML     Post Treatment Position chair  -ML     In Chair notified nsg;reclined;call light within reach;encouraged to call for assist;exit alarm on;waffle cushion;legs elevated  -ML               User Key  (r) = Recorded By, (t) = Taken By, (c) = Cosigned By      Initials Name Provider Type    Dari Tamayo Physical Therapist                   Outcome Measures       Row Name 12/27/23 1055          How much help from another person do you currently need...    Turning from your back to your side while in flat bed without using bedrails? 4  -ML     Moving from lying on back to sitting on the side of a flat bed without bedrails? 4  -ML     Moving to and from a bed to a chair (including a wheelchair)? 3  -ML     Standing up from a chair using your arms (e.g., wheelchair, bedside chair)? 4  -ML     Climbing 3-5 steps with a railing? 3  -ML     To walk in hospital room? 3  -ML     AM-PAC 6 Clicks Score (PT) 21  -ML     Highest Level of Mobility Goal 6 --> Walk 10 steps or more  -ML       Row Name 12/27/23 1055          Functional Assessment    Outcome Measure Options AM-PAC 6 Clicks Basic Mobility (PT)  -ML               User Key  (r) = Recorded By, (t) = Taken By, (c) = Cosigned By      Initials Name Provider Type    Dari Tamayo Physical Therapist                                  Physical Therapy Education       Title: PT OT SLP Therapies (In Progress)       Topic: Physical Therapy (In Progress)       Point: Mobility training (Done)       Learning Progress Summary             Patient Acceptance, E, VU by  at 12/27/2023 1055                         Point: Home exercise program (Not Started)       Learner Progress:  Not documented in this visit.              Point: Body mechanics (Not Started)       Learner Progress:  Not documented in this visit.              Point: Precautions (Done)       Learning Progress Summary             Patient Acceptance, E, VU by  at 12/27/2023 1055                                         User Key       Initials Effective Dates Name Provider Type Discipline     04/22/21 -  Dari Fischer Physical Therapist PT                  PT Recommendation and Plan  Planned Therapy Interventions (PT): balance training, bed mobility training, gait training, home exercise program, neuromuscular re-education, patient/family education, postural re-education, ROM (range of motion), strengthening, stretching, transfer training  Plan of Care Reviewed With: patient  Outcome Evaluation: Patient required CGA to ambulate in room with RW, patient without increased work of breathing during ambulation. The patient currently presents below baseline for mobility and would continue to benefit from skilled PT to address strength, balance and activity tolerance deficits.     Time Calculation:   PT Evaluation Complexity  History, PT Evaluation Complexity: 1-2 personal factors and/or comorbidities  Examination of Body Systems (PT Eval Complexity): total of 3 or more elements  Clinical Presentation (PT Evaluation Complexity): evolving  Clinical Decision Making (PT Evaluation Complexity): moderate complexity  Overall Complexity (PT Evaluation Complexity): moderate complexity     PT Charges       Row Name 12/27/23 1056             Time Calculation    Start Time 1005  -ML      PT Received On  12/27/23  -ML      PT Goal Re-Cert Due Date 01/06/24  -ML         Timed Charges    65044 - PT Therapeutic Activity Minutes 15  -ML         Untimed Charges    PT Eval/Re-eval Minutes 31  -ML         Total Minutes    Timed Charges Total Minutes 15  -ML      Untimed Charges Total Minutes 31  -ML       Total Minutes 46  -ML                User Key  (r) = Recorded By, (t) = Taken By, (c) = Cosigned By      Initials Name Provider Type    Dari Tamayo Physical Therapist                  Therapy Charges for Today       Code Description Service Date Service Provider Modifiers Qty    38708879533 HC PT THERAPEUTIC ACT EA 15 MIN 12/27/2023 Dari Fischer GP 1    54670029023 HC PT EVAL MOD COMPLEXITY 3 12/27/2023 Dari Fischer GP 1            PT G-Codes  Outcome Measure Options: AM-PAC 6 Clicks Basic Mobility (PT)  AM-PAC 6 Clicks Score (PT): 21  PT Discharge Summary  Anticipated Discharge Disposition (PT): home with assist, home with home health    Dari Fischer  12/27/2023

## 2023-12-27 NOTE — PAYOR COMM NOTE
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                               Abnormality         Status                     ---------                               -----------         ------                     CBC Auto Differential[[id number]]        Abnormal            Final result                 Please view results for these tests on the individual orders.   GREEN TOP   LAVENDER TOP   GOLD TOP - SST   LIGHT BLUE TOP   GRAY TOP       Meds Given in ED:   Medications   sodium chloride 0.9 % flush 10 mL (has no administration in time range)   ipratropium-albuterol (DUO-NEB) nebulizer solution 3 mL (has no administration in time range)   Pharmacy Consult - Remdesivir for Severe COVID-19 (Within 7 days of symptom onset) (has no administration in time range)   dexAMETHasone (DECADRON) tablet 6 mg (has no administration in time range)     Or   dexAMETHasone (DECADRON) injection 6 mg (has no administration in time range)     Pharmacy Consult - Remdesivir,             Electronically signed by Tori Bonilla RN at 12/26/23 8665       Vital Signs (last day)       Date/Time Temp Temp src Pulse Resp  BP Patient Position SpO2    12/27/23 1305 -- -- 77 18 -- -- 100    12/27/23 1135 98.3 (36.8) Oral 62 18 153/68 Lying 100    12/27/23 0901 -- -- 64 18 -- -- 99    12/27/23 0720 98.3 (36.8) Oral 52 18 122/64 Lying 98    12/27/23 0600 -- -- 63 -- -- -- 98    12/27/23 0419 -- -- 45  -- -- -- --    12/27/23 0341 98.3 (36.8) Oral 49 18 147/70 Lying 99    12/27/23 0200 -- -- 52 -- -- -- 95    12/26/23 2256 98.1 (36.7) Oral 52 18 159/64 Lying 100    12/26/23 2215 98.5 (36.9) Oral 50 -- 111/51 -- 100    12/26/23 2200 -- -- 50 -- 118/59 -- 100    12/26/23 2158 -- -- 53 -- 130/58 -- 100    12/26/23 2115 -- -- 52 -- 108/57 -- 100    12/26/23 1706 98.2 (36.8) Oral 62 17 126/52 Sitting 97          Oxygen Therapy (last day)       Date/Time SpO2 Device (Oxygen Therapy) Flow (L/min) Oxygen Concentration (%) ETCO2 (mmHg)    12/27/23 1305 100 nasal cannula 2  -- --    12/27/23 1200 -- nasal cannula 2 -- --    12/27/23 1135 100 nasal cannula 3 -- --    12/27/23 1000 -- nasal cannula 3 -- --    12/27/23 0901 99 nasal cannula 3 -- --    12/27/23 0859 -- nasal cannula 2 -- --    12/27/23 0720 98 nasal cannula 2 -- --    12/27/23 0624 -- nasal cannula 2 -- --    12/27/23 0600 98 -- -- -- --    12/27/23 0417 -- nasal cannula 2 -- --    12/27/23 0341 99 nasal cannula 2 -- --    12/27/23 0200 95 nasal cannula 2 -- --    12/27/23 0020 -- nasal cannula 2 -- --    12/26/23 2256 100 nasal cannula 2 -- --    12/26/23 2215 100 -- -- -- --    12/26/23 2200 100 -- -- -- --    12/26/23 2158 100 -- -- -- --    12/26/23 2115 100 -- -- -- --    12/26/23 1706 97 -- -- -- --          Lines, Drains & Airways       Active LDAs       Name Placement date Placement time Site Days    Peripheral IV 12/26/23 1846 Left Antecubital 12/26/23 1846  Antecubital  less than 1                  Current Facility-Administered Medications   Medication Dose Route Frequency Provider Last Rate Last Admin    acetaminophen (TYLENOL) tablet 650 mg  650 mg Oral Q4H PRN Manny,  Angel AGUILAR MD        albuterol sulfate HFA (PROVENTIL HFA;VENTOLIN HFA;PROAIR HFA) inhaler 2 puff  2 puff Inhalation 4x Daily - RT Angel Bryant MD   2 puff at 12/27/23 1305    apixaban (ELIQUIS) tablet 2.5 mg  2.5 mg Oral Q12H Angel Bryant MD   2.5 mg at 12/27/23 0920    aspirin chewable tablet 81 mg  81 mg Oral Daily Angel Bryant MD   81 mg at 12/27/23 0915    sennosides-docusate (PERICOLACE) 8.6-50 MG per tablet 2 tablet  2 tablet Oral BID Angel Bryant MD   2 tablet at 12/27/23 1103    And    polyethylene glycol (MIRALAX) packet 17 g  17 g Oral Daily PRN Angel Bryant MD        And    bisacodyl (DULCOLAX) EC tablet 5 mg  5 mg Oral Daily PRN Angel Bryant MD        And    bisacodyl (DULCOLAX) suppository 10 mg  10 mg Rectal Daily PRN Angel Bryant MD        budesonide-formoterol (SYMBICORT) 160-4.5 MCG/ACT inhaler 2 puff  2 puff Inhalation BID - RT Angel Bryant MD   2 puff at 12/27/23 0901    busPIRone (BUSPAR) tablet 7.5 mg  7.5 mg Oral TID Angel Bryant MD   7.5 mg at 12/27/23 0915    Calcium Replacement - Follow Nurse / BPA Driven Protocol   Does not apply PRN Angel Bryant MD        dexAMETHasone (DECADRON) tablet 6 mg  6 mg Oral Daily Angel Bryant MD   6 mg at 12/26/23 2218    Or    dexAMETHasone (DECADRON) injection 6 mg  6 mg Intravenous Daily Angel Bryant MD        dextrose (D50W) (25 g/50 mL) IV injection 25 g  25 g Intravenous Q15 Min PRN Angel Bryant MD        dextrose (GLUTOSE) oral gel 15 g  15 g Oral Q15 Min PRN Angel Bryant MD        doxycycline (VIBRAMYCIN) 100 mg in sodium chloride 0.9 % 100 mL IVPB  100 mg Intravenous Q12H Angel Bryant MD   100 mg at 12/27/23 0129    famotidine (PEPCID) tablet 20 mg  20 mg Oral BID Angel Bryant MD   20 mg at 12/27/23 0920    gabapentin (NEURONTIN) capsule 300 mg  300 mg Oral TID Angel Bryant MD   300 mg at 12/27/23 0915    glucagon (GLUCAGEN) injection 1 mg  1 mg Intramuscular Q15 Min PRN  Angel Bryant MD        Insulin Lispro (humaLOG) injection 2-7 Units  2-7 Units Subcutaneous 4x Daily With Meals & Nightly Angel Bryant MD        Magnesium Standard Dose Replacement - Follow Nurse / BPA Driven Protocol   Does not apply PRN Angel Bryant MD        [Held by provider] metoprolol tartrate (LOPRESSOR) tablet 25 mg  25 mg Oral BID Angel Bryant MD        multivitamin with minerals 1 tablet  1 tablet Oral Daily Angel Bryant MD   1 tablet at 12/27/23 0915    Pharmacy Consult - Remdesivir for Severe COVID-19 (Within 7 days of symptom onset)   Does not apply Continuous PRN Angel Bryant MD        Phosphorus Replacement - Follow Nurse / BPA Driven Protocol   Does not apply PRN Angel Bryant MD        Potassium Replacement - Follow Nurse / BPA Driven Protocol   Does not apply PRN Angel Bryant MD        pramipexole (MIRAPEX) tablet 0.125 mg  0.125 mg Oral Nightly Angel Bryant MD        pravastatin (PRAVACHOL) tablet 20 mg  20 mg Oral Nightly Angel Bryant MD   20 mg at 12/27/23 0020    remdesivir 100 mg in sodium chloride 0.9 % 250 mL IVPB (powder vial)  100 mg Intravenous Q24H Primo Beltran, PharmD        sertraline (ZOLOFT) tablet 200 mg  200 mg Oral Daily Angel Bryant MD   200 mg at 12/27/23 0915    sodium chloride 0.9 % flush 10 mL  10 mL Intravenous PRN Emergency, Triage Protocol, MD        sodium chloride 0.9 % flush 10 mL  10 mL Intravenous Q12H Angel Bryant MD   10 mL at 12/27/23 0916    sodium chloride 0.9 % flush 10 mL  10 mL Intravenous PRN Angel Bryant MD        sodium chloride 0.9 % infusion 40 mL  40 mL Intravenous PRN Angel Bryant MD        tiZANidine (ZANAFLEX) tablet 4 mg  4 mg Oral TID PRN Angel Bryant MD        traZODone (DESYREL) tablet 150 mg  150 mg Oral Nightly Angel Bryant MD   150 mg at 12/27/23 0020    vitamin B-12 (CYANOCOBALAMIN) tablet 500 mcg  500 mcg Oral Daily Angel Bryant MD   500 mcg at 12/27/23  0915     Lab Results (last 24 hours)       Procedure Component Value Units Date/Time    POC Glucose Once [[id number]]  (Abnormal) Collected: 12/27/23 1135    Specimen: Blood Updated: 12/27/23 1140     Glucose 136 mg/dL     CBC (No Diff) [[id number]]  (Abnormal) Collected: 12/27/23 0708    Specimen: Blood Updated: 12/27/23 0722     WBC 5.54 10*3/mm3      RBC 3.28 10*6/mm3      Hemoglobin 8.8 g/dL      Hematocrit 28.8 %      MCV 87.8 fL      MCH 26.8 pg      MCHC 30.6 g/dL      RDW 14.5 %      RDW-SD 46.5 fl      MPV 11.7 fL      Platelets 221 10*3/mm3     POC Glucose Once [[id number]]  (Normal) Collected: 12/27/23 0720    Specimen: Blood Updated: 12/27/23 0722     Glucose 127 mg/dL     Magnesium [[id number]]  (Abnormal) Collected: 12/27/23 0522    Specimen: Blood Updated: 12/27/23 0640     Magnesium 2.7 mg/dL     Comprehensive Metabolic Panel [[id number]]  (Abnormal) Collected: 12/27/23 0522    Specimen: Blood Updated: 12/27/23 0633     Glucose 196 mg/dL      BUN 23 mg/dL      Creatinine 0.82 mg/dL      Sodium 137 mmol/L      Potassium 4.4 mmol/L      Chloride 102 mmol/L      CO2 27.0 mmol/L      Calcium 8.5 mg/dL      Total Protein 6.1 g/dL      Albumin 2.7 g/dL      ALT (SGPT) 11 U/L      AST (SGOT) 16 U/L      Alkaline Phosphatase 83 U/L      Total Bilirubin <0.2 mg/dL      Globulin 3.4 gm/dL      Comment: Calculated Result        A/G Ratio 0.8 g/dL      BUN/Creatinine Ratio 28.0     Anion Gap 8.0 mmol/L      eGFR 78.0 mL/min/1.73     Narrative:      GFR Normal >60  Chronic Kidney Disease <60  Kidney Failure <15      C-reactive Protein [[id number]]  (Abnormal) Collected: 12/27/23 0522    Specimen: Blood Updated: 12/27/23 0633     C-Reactive Protein 7.60 mg/dL     Urinalysis With Microscopic If Indicated (No Culture) - Straight Cath [[id number]]  (Abnormal) Collected: 12/27/23 0402    Specimen: Urine from Straight Cath Updated: 12/27/23 0406     Color, UA Yellow     Appearance, UA Turbid     pH, UA 8.0     Specific  Gravity, UA 1.019     Glucose, UA Negative     Ketones, UA Negative     Bilirubin, UA Negative     Blood, UA Negative     Protein, UA 30 mg/dL (1+)     Leuk Esterase, UA Trace     Nitrite, UA Positive     Urobilinogen, UA 0.2 E.U./dL    Urinalysis, Microscopic Only - Straight Cath [[id number]]  (Abnormal) Collected: 12/27/23 0402    Specimen: Urine from Straight Cath Updated: 12/27/23 0406     RBC, UA 0-2 /HPF      WBC, UA 6-10 /HPF      Bacteria, UA 4+ /HPF      Squamous Epithelial Cells, UA 3-6 /HPF      Hyaline Casts, UA 0-6 /LPF      Methodology Automated Microscopy    BNP [[id number]]  (Normal) Collected: 12/26/23 2050    Specimen: Blood Updated: 12/27/23 0150     proBNP 529.2 pg/mL     Narrative:      This assay is used as an aid in the diagnosis of individuals suspected of having heart failure. It can be used as an aid in the diagnosis of acute decompensated heart failure (ADHF) in patients presenting with signs and symptoms of ADHF to the emergency department (ED). In addition, NT-proBNP of <300 pg/mL indicates ADHF is not likely.    Age Range Result Interpretation  NT-proBNP Concentration (pg/mL:      <50             Positive            >450                   Gray                 300-450                    Negative             <300    50-75           Positive            >900                  Gray                300-900                  Negative            <300      >75             Positive            >1800                  Gray                300-1800                  Negative            <300    C-reactive Protein [[id number]]  (Abnormal) Collected: 12/26/23 2050    Specimen: Blood Updated: 12/26/23 2352     C-Reactive Protein 8.42 mg/dL     COVID PRE-OP / PRE-PROCEDURE SCREENING ORDER (NO ISOLATION) - Swab, Nasal Cavity [[id number]]  (Abnormal) Collected: 12/26/23 2156    Specimen: Swab from Nasal Cavity Updated: 12/26/23 2257    Narrative:      The following orders were created for panel order COVID PRE-OP /  PRE-PROCEDURE SCREENING ORDER (NO ISOLATION) - Swab, Nasal Cavity.  Procedure                               Abnormality         Status                     ---------                               -----------         ------                     COVID-19 and FLU A/B PCR...[[id number]]  Abnormal            Final result                 Please view results for these tests on the individual orders.    COVID-19 and FLU A/B PCR, 1 HR TAT - Swab, Nasopharynx [[id number]]  (Abnormal) Collected: 12/26/23 2156    Specimen: Swab from Nasopharynx Updated: 12/26/23 2257     COVID19 Detected     Influenza A PCR Not Detected     Influenza B PCR Not Detected    Narrative:      Fact sheet for providers: https://www.fda.gov/media/663577/download    Fact sheet for patients: https://www.fda.gov/media/692478/download    Test performed by PCR.  Influenza A and Influenza B negative results should be considered presumptive in samples that have a positive SARS-CoV-2 result.    Competitive inhibition studies showed that SARS-CoV-2 virus, when present at concentrations above 3.6E+04 copies/mL, can inhibit the detection and amplification of influenza A and influenza B virus RNA if present at or below 1.8E+02 copies/mL or 4.9E+02 copies/mL, respectively, and may lead to false negative influenza virus results. If co-infection with influenza A or influenza B virus is suspected in samples with a positive SARS-CoV-2 result, the sample should be re-tested with another FDA cleared, approved, or authorized influenza test, if influenza virus detection would change clinical management.    Tad Draw [[id number]] Collected: 12/26/23 1845    Specimen: Blood Updated: 12/26/23 2246    Narrative:      The following orders were created for panel order Tad Draw.  Procedure                               Abnormality         Status                     ---------                               -----------         ------                     Green Top (Gel)[[id number]]   "                                Final result               Lavender Top[[id number]]                                     Final result               Gold Top - SST[[id number]]                                   Final result               Gray Top[[id number]]                                         Final result               Light Blue Top[[id number]]                                   Final result                 Please view results for these tests on the individual orders.    Carrillo Top [[id number]] Collected: 12/26/23 1845    Specimen: Blood Updated: 12/26/23 2246     Extra Tube Hold for add-ons.     Comment: Auto resulted.       D-dimer, Quantitative [[id number]]  (Abnormal) Collected: 12/26/23 1845    Specimen: Blood Updated: 12/26/23 2215     D-Dimer, Quantitative 1.57 MCGFEU/mL     Narrative:      According to the assay 's published package insert, a normal (<0.50 MCGFEU/mL) D-dimer result in conjunction with a non-high clinical probability assessment, excludes deep vein thrombosis (DVT) and pulmonary embolism (PE) with high sensitivity.    D-dimer values increase with age and this can make VTE exclusion of an older population difficult. To address this, the [employer], based on best available evidence and recent guidelines, recommends that clinicians use age-adjusted D-dimer thresholds in patients greater than 50 years of age with: a) a low probability of PE who do not meet all Pulmonary Embolism Rule Out Criteria, or b) in those with intermediate probability of PE.   The formula for an age-adjusted D-dimer cut-off is \"age/100\".  For example, a 60 year old patient would have an age-adjusted cut-off of 0.60 MCGFEU/mL and an 80 year old 0.80 MCGFEU/mL.    Green Top (Gel) [[id number]] Collected: 12/26/23 2050    Specimen: Blood Updated: 12/26/23 2200     Extra Tube Hold for add-ons.     Comment: Auto resulted.       Gold Top - SST [[id number]] Collected: 12/26/23 2050    Specimen: Blood " "Updated: 12/26/23 2200     Extra Tube Hold for add-ons.     Comment: Auto resulted.       Procalcitonin [[id number]]  (Abnormal) Collected: 12/26/23 2050    Specimen: Blood Updated: 12/26/23 2139     Procalcitonin 0.50 ng/mL     Narrative:      As a Marker for Sepsis (Non-Neonates):    1. <0.5 ng/mL represents a low risk of severe sepsis and/or septic shock.  2. >2 ng/mL represents a high risk of severe sepsis and/or septic shock.    As a Marker for Lower Respiratory Tract Infections that require antibiotic therapy:    PCT on Admission    Antibiotic Therapy       6-12 Hrs later    >0.5                Strongly Recommended  >0.25 - <0.5        Recommended   0.1 - 0.25          Discouraged              Remeasure/reassess PCT  <0.1                Strongly Discouraged     Remeasure/reassess PCT    As 28 day mortality risk marker: \"Change in Procalcitonin Result\" (>80% or <=80%) if Day 0 (or Day 1) and Day 4 values are available. Refer to http://www.[employer]-pct-calculator.com    Change in PCT <=80%  A decrease of PCT levels below or equal to 80% defines a positive change in PCT test result representing a higher risk for 28-day all-cause mortality of patients diagnosed with severe sepsis for septic shock.    Change in PCT >80%  A decrease of PCT levels of more than 80% defines a negative change in PCT result representing a lower risk for 28-day all-cause mortality of patients diagnosed with severe sepsis or septic shock.       Comprehensive Metabolic Panel [[id number]]  (Abnormal) Collected: 12/26/23 2050    Specimen: Blood Updated: 12/26/23 2136     Glucose 141 mg/dL      BUN 24 mg/dL      Creatinine 1.02 mg/dL      Sodium 140 mmol/L      Potassium 4.1 mmol/L      Chloride 102 mmol/L      CO2 28.0 mmol/L      Calcium 9.0 mg/dL      Total Protein 7.0 g/dL      Albumin 3.3 g/dL      ALT (SGPT) 13 U/L      AST (SGOT) 19 U/L      Alkaline Phosphatase 94 U/L      Total Bilirubin 0.2 mg/dL      Globulin 3.7 gm/dL      Comment: " Calculated Result        A/G Ratio 0.9 g/dL      BUN/Creatinine Ratio 23.5     Anion Gap 10.0 mmol/L      eGFR 60.0 mL/min/1.73     Narrative:      GFR Normal >60  Chronic Kidney Disease <60  Kidney Failure <15      Magnesium [[id number]]  (Abnormal) Collected: 12/26/23 2050    Specimen: Blood Updated: 12/26/23 2136     Magnesium 1.5 mg/dL     Single High Sensitivity Troponin T [[id number]]  (Abnormal) Collected: 12/26/23 2050    Specimen: Blood Updated: 12/26/23 2133     HS Troponin T 23 ng/L     Narrative:      High Sensitive Troponin T Reference Range:  <14.0 ng/L- Negative Female for AMI  <22.0 ng/L- Negative Male for AMI  >=14 - Abnormal Female indicating possible myocardial injury.  >=22 - Abnormal Male indicating possible myocardial injury.   Clinicians would have to utilize clinical acumen, EKG, Troponin, and serial changes to determine if it is an Acute Myocardial Infarction or myocardial injury due to an underlying chronic condition.         Lavender Top [[id number]] Collected: 12/26/23 1845    Specimen: Blood Updated: 12/26/23 2001     Extra Tube hold for add-on     Comment: Auto resulted       Light Blue Top [[id number]] Collected: 12/26/23 1845    Specimen: Blood Updated: 12/26/23 2001     Extra Tube Hold for add-ons.     Comment: Auto resulted       Lactic Acid, Plasma [[id number]]  (Normal) Collected: 12/26/23 1845    Specimen: Blood Updated: 12/26/23 1937     Lactate 0.9 mmol/L      Comment: Falsely depressed results may occur on samples drawn from patients receiving N-Acetylcysteine (NAC) or Metamizole.       CBC & Differential [[id number]]  (Abnormal) Collected: 12/26/23 1845    Specimen: Blood Updated: 12/26/23 1856    Narrative:      The following orders were created for panel order CBC & Differential.  Procedure                               Abnormality         Status                     ---------                               -----------         ------                     CBC Auto  Differential[[id number]]        Abnormal            Final result                 Please view results for these tests on the individual orders.    CBC Auto Differential [[id number]]  (Abnormal) Collected: 12/26/23 1845    Specimen: Blood Updated: 12/26/23 1856     WBC 10.73 10*3/mm3      RBC 3.89 10*6/mm3      Hemoglobin 10.4 g/dL      Hematocrit 34.7 %      MCV 89.2 fL      MCH 26.7 pg      MCHC 30.0 g/dL      RDW 14.6 %      RDW-SD 46.9 fl      MPV 11.8 fL      Platelets 258 10*3/mm3      Neutrophil % 78.4 %      Lymphocyte % 16.8 %      Monocyte % 4.4 %      Eosinophil % 0.0 %      Basophil % 0.1 %      Immature Grans % 0.3 %      Neutrophils, Absolute 8.42 10*3/mm3      Lymphocytes, Absolute 1.80 10*3/mm3      Monocytes, Absolute 0.47 10*3/mm3      Eosinophils, Absolute 0.00 10*3/mm3      Basophils, Absolute 0.01 10*3/mm3      Immature Grans, Absolute 0.03 10*3/mm3      nRBC 0.0 /100 WBC           Imaging Results (Last 24 Hours)       Procedure Component Value Units Date/Time    XR Chest 1 View [[id number]] Collected: 12/26/23 1745     Updated: 12/26/23 1752    Narrative:      XR CHEST 1 VW    Date of Exam: 12/26/2023 5:32 PM EST    Indication: Weak/Dizzy/AMS triage protocol    Comparison: Chest CT 10/26/2023.    Findings:  Sternotomy and CABG. Unchanged cardiac silhouette. Patchy, multifocal interstitial and airspace opacities. No pleural effusion. No pneumothorax. Similar chronic left rib deformities.      Impression:      Impression:  Multifocal interstitial and airspace opacities, suspicious for residual/recurrent multifocal pneumonia, less likely pulmonary edema.      Electronically Signed: Bronson Adkins MD    12/26/2023 5:49 PM EST    Workstation ID: [id number]          ECG/EMG Results (last 24 hours)       Procedure Component Value Units Date/Time    ECG 12 Lead ED Triage Standing Order; Weak / Dizzy / AMS [[id number]] Collected: 12/26/23 1857     Updated: 12/27/23 0718     QT Interval 414 ms      QTC Interval  406 ms     Narrative:      Test Reason : ED Triage Standing Order~  Blood Pressure :   */*   mmHG  Vent. Rate :  58 BPM     Atrial Rate :  58 BPM     P-R Int : 140 ms          QRS Dur :  92 ms      QT Int : 414 ms       P-R-T Axes :  92  46  51 degrees     QTc Int : 406 ms    Sinus bradycardia  Moderate voltage criteria for LVH, may be normal variant  Borderline ECG  When compared with ECG of 25-OCT-2023 23:36,  No significant change was found    Referred By: EDMD           Confirmed By:              Physician Progress Notes (last 24 hours)        Nusrat Dickey MD at 23 0457              [facility] Medicine Services  PROGRESS NOTE    Patient Name: Sheree Samuel  : 1955  MRN: [id number]    Date of Admission: 2023  Primary Care Physician: Delilah Becker APRN    Subjective   Subjective     CC:  SOA    HPI:  Pt admitted earlier this am by my colleague      Objective   Objective     Vital Signs:   Temp:  [96.8 °F (36 °C)-98.5 °F (36.9 °C)] 98.3 °F (36.8 °C)  Heart Rate:  [45-67] 45  Resp:  [17-18] 18  BP: (108-159)/(51-70) 147/70  Flow (L/min):  [2] 2     Physical Exam:  Deferred    Results Reviewed:  LAB RESULTS:      Lab 23   WBC  --  10.73   HEMOGLOBIN  --  10.4*   HEMATOCRIT  --  34.7   PLATELETS  --  258   NEUTROS ABS  --  8.42*   IMMATURE GRANS (ABS)  --  0.03   LYMPHS ABS  --  1.80   MONOS ABS  --  0.47   EOS ABS  --  0.00   MCV  --  89.2   CRP 8.42*  --    PROCALCITONIN 0.50*  --    LACTATE  --  0.9   D DIMER QUANT  --  1.57*         Lab 23   SODIUM 140   POTASSIUM 4.1   CHLORIDE 102   CO2 28.0   ANION GAP 10.0   BUN 24*   CREATININE 1.02*   EGFR 60.0*   GLUCOSE 141*   CALCIUM 9.0   MAGNESIUM 1.5*         Lab 23   TOTAL PROTEIN 7.0   ALBUMIN 3.3*   GLOBULIN 3.7   ALT (SGPT) 13   AST (SGOT) 19   BILIRUBIN 0.2   ALK PHOS 94         Lab 23   PROBNP 529.2   HSTROP T 23*                 Brief Urine  Lab Results  (Last result in the past 365 days)        Color   Clarity   Blood   Leuk Est   Nitrite   Protein   CREAT   Urine HCG        12/27/23 0402 Yellow   Turbid   Negative   Trace   Positive   30 mg/dL (1+)                   Microbiology Results Abnormal       None            XR Chest 1 View    Result Date: 12/26/2023  XR CHEST 1 VW Date of Exam: 12/26/2023 5:32 PM EST Indication: Weak/Dizzy/AMS triage protocol Comparison: Chest CT 10/26/2023. Findings: Sternotomy and CABG. Unchanged cardiac silhouette. Patchy, multifocal interstitial and airspace opacities. No pleural effusion. No pneumothorax. Similar chronic left rib deformities.     Impression: Impression: Multifocal interstitial and airspace opacities, suspicious for residual/recurrent multifocal pneumonia, less likely pulmonary edema. Electronically Signed: Bronson Adkins MD  12/26/2023 5:49 PM EST  Workstation ID: [id number]     Results for orders placed during the hospital encounter of 10/25/23    Adult Transthoracic Echo Complete W/ Cont if Necessary Per Protocol    Interpretation Summary    Left atrial volume is mildly increased.    There is calcification of the aortic valve.    Estimated right ventricular systolic pressure from tricuspid regurgitation is normal (<35 mmHg).    MAC    Normal LVSF      Current medications:  Scheduled Meds:albuterol sulfate HFA, 2 puff, Inhalation, 4x Daily - RT  apixaban, 2.5 mg, Oral, Q12H  aspirin, 81 mg, Oral, Daily  budesonide-formoterol, 2 puff, Inhalation, BID - RT  busPIRone, 7.5 mg, Oral, TID  dexAMETHasone, 6 mg, Oral, Daily   Or  dexAMETHasone, 6 mg, Intravenous, Daily  doxycycline, 100 mg, Intravenous, Q12H  famotidine, 20 mg, Oral, BID  gabapentin, 300 mg, Oral, TID  insulin lispro, 2-7 Units, Subcutaneous, 4x Daily With Meals & Nightly  magnesium sulfate, 2 g, Intravenous, Q2H  [Held by provider] metoprolol tartrate,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

## 2023-12-27 NOTE — PLAN OF CARE
Goal Outcome Evaluation:  Plan of Care Reviewed With: patient           Outcome Evaluation: Patient required CGA to ambulate in room with RW, patient without increased work of breathing during ambulation. The patient currently presents below baseline for mobility and would continue to benefit from skilled PT to address strength, balance and activity tolerance deficits.      Anticipated Discharge Disposition (PT): home with assist, home with home health

## 2023-12-27 NOTE — ED PROVIDER NOTES
Subjective   History of Present Illness    Pt presents with cough and dyspnea for 3 days.    She says she told home health this morning that she thought she had pneumonia based on increased dyspnea and cough.  She says sats were 83% room air. She has home oxygen prn and they put it on but still had trouble getting sats up.   sent her to PCP and they found her to be positive for covid and sent her on here.      Some sore throat. No fever, vomiting or diarrhea.      History provided by:  Patient      Review of Systems   Constitutional:  Negative for fever.   HENT:  Positive for congestion.    Respiratory:  Positive for cough, shortness of breath and wheezing.    Cardiovascular:  Negative for chest pain.   Gastrointestinal:  Negative for abdominal pain and vomiting.   All other systems reviewed and are negative.      Past Medical History:   Diagnosis Date    Anxiety     Arthritis     ASHD (arteriosclerotic heart disease)     Cancer     Cervical disc disorder of mid-cervical region     Chronic pain     Depression     Depression     Diabetes mellitus     Heart disease     Hyperlipidemia     Hypertension     MRSA carrier     Myocardial infarction     Pulmonary embolism        Allergies   Allergen Reactions    Lipitor [Atorvastatin] Other (See Comments)     Causes hair to fall out    Narcan [Naloxone Hcl] Other (See Comments)     Caused a heart attack    Neosporin [Neomycin-Bacitracin Zn-Polymyx] Other (See Comments)     Blisters    Penicillins Anaphylaxis    Abilify [Aripiprazole] Rash     Unknown reaction    Ceftin [Cefuroxime] Nausea And Vomiting    Duloxetine Hcl Unknown - Low Severity    Latex Unknown - Low Severity    Tape Unknown - High Severity    Adhesive Tape Rash    Flexeril [Cyclobenzaprine] Rash    Vioxx [Rofecoxib] Rash       Past Surgical History:   Procedure Laterality Date    BREAST EXCISIONAL BIOPSY Right     x 2    CARDIAC CATHETERIZATION N/A 8/22/2016    Procedure: Left Heart Cath with +/- CBI;   Surgeon: Bernard Palumbo MD;  Location:  [facility];  Service:     CARDIAC CATHETERIZATION Bilateral 7/12/2018    Procedure: Right and Left Heart Cath;  Surgeon: Seth Farnsworth MD;  Location:  [facility];  Service: Cardiovascular    CORONARY ARTERY BYPASS GRAFT      CORONARY ARTERY BYPASS GRAFT      HYSTERECTOMY      OOPHORECTOMY      PAIN PUMP INSERTION/REVISION      PAIN PUMP INSERTION/REVISION      REDUCTION MAMMAPLASTY      early 80's       Family History   Problem Relation Age of Onset    Stroke Mother     Alzheimer's disease Mother     Diabetic kidney disease Mother     Heart attack Father     Alzheimer's disease Maternal Grandmother     Breast cancer Maternal Grandmother     Cancer Daughter     Heart disease Daughter     Ovarian cancer Neg Hx        Social History     Socioeconomic History    Marital status:    Tobacco Use    Smoking status: Never    Smokeless tobacco: Never   Vaping Use    Vaping Use: Never used   Substance and Sexual Activity    Alcohol use: No    Drug use: No    Sexual activity: Not Currently           Objective   Physical Exam  Vitals and nursing note reviewed.   Constitutional:       General: She is not in acute distress.     Appearance: Normal appearance. She is ill-appearing.   HENT:      Head: Normocephalic and atraumatic.      Mouth/Throat:      Mouth: Mucous membranes are moist.   Eyes:      General: No scleral icterus.        Right eye: No discharge.         Left eye: No discharge.      Conjunctiva/sclera: Conjunctivae normal.   Cardiovascular:      Rate and Rhythm: Normal rate and regular rhythm.      Heart sounds: No murmur heard.  Pulmonary:      Effort: Tachypnea present.      Breath sounds: Normal breath sounds. No wheezing.   Abdominal:      General: Bowel sounds are normal. There is no distension.      Palpations: Abdomen is soft.      Tenderness: There is no abdominal tenderness. There is no guarding or rebound.    Musculoskeletal:         General: No swelling. Normal range of motion.      Cervical back: Normal range of motion and neck supple.   Skin:     General: Skin is warm and dry.      Findings: No rash.   Neurological:      General: No focal deficit present.      Mental Status: She is alert. Mental status is at baseline.   Psychiatric:         Mood and Affect: Mood normal.         Behavior: Behavior normal.         Thought Content: Thought content normal.         Procedures           ED Course                                             Medical Decision Making  Problems Addressed:  Pneumonia due to COVID-19 virus: acute illness or injury    Amount and/or Complexity of Data Reviewed  Labs: ordered. Decision-making details documented in ED Course.  Radiology: ordered and independent interpretation performed. Decision-making details documented in ED Course.     Details: CXR multifocal pneumonia, read by me  ECG/medicine tests: ordered and independent interpretation performed. Decision-making details documented in ED Course.     Details: EKG sinus arpit, read by me  Discussion of management or test interpretation with external provider(s): hospitalist    Risk  Decision regarding hospitalization.        Final diagnoses:   Pneumonia due to COVID-19 virus       ED Disposition  ED Disposition       ED Disposition   Decision to Admit    Condition   --    Comment   Level of Care: Telemetry [5]   Diagnosis: COVID [[id number]]   Admitting Physician: SONJA HO [[id number]]   Attending Physician: SONJA HO [[id number]]   Certification: I Certify That Inpatient Hospital Services Are Medically Necessary For Greater Than 2 Midnights                 No follow-up provider specified.       Medication List      No changes were made to your prescriptions during this visit.            Saad Dee MD  12/27/23 7644

## 2023-12-27 NOTE — CASE MANAGEMENT/SOCIAL WORK
Discharge Planning Assessment  [facility]     Patient Name: Sheree Samuel  MRN: [id number]  Today's Date: 12/27/2023    Admit Date: 12/26/2023    Plan: Home with home health   Discharge Needs Assessment       Row Name 12/27/23 1428       Living Environment    People in Home child(barbara), adult;spouse    Current Living Arrangements home    Primary Care Provided by self    Provides Primary Care For no one    Family Caregiver if Needed child(barbara), adult;spouse    Quality of Family Relationships unable to assess    Able to Return to Prior Arrangements yes       Resource/Environmental Concerns    Resource/Environmental Concerns none    Transportation Concerns none       Transition Planning    Patient/Family Anticipates Transition to home with help/services;home with family    Patient/Family Anticipated Services at Transition ;home health care    Transportation Anticipated family or friend will provide       Discharge Needs Assessment    Readmission Within the Last 30 Days no previous admission in last 30 days    Equipment Currently Used at Home wheelchair;cane, quad;walker, rolling;rollator  Scooter. CPAP-doesn't use.    Concerns to be Addressed discharge planning    Anticipated Changes Related to Illness none    Equipment Needed After Discharge other (see comments)  TBD    Discharge Facility/Level of Care Needs home with home health                   Discharge Plan       Row Name 12/27/23 1938       Plan    Plan Home with home health    Patient/Family in Agreement with Plan yes    Plan Comments Spoke to patient to initiate discharge planning. Patient lives at home with spouse and adult daughter in [facility]. Prior to admission, she was independent with ADL's. She has a rolling walker, rollator, quad cane, scooter, & CPAP (doesn't use). She is current with [facility] for PT per Jeff Braswell's note in epic on 12/27. No home oxygen. Her PCP is Delilah Coronel. Verified Sallie Freeman. Her plan is  home with home health. Family will transport. CM will continue to follow.    Final Discharge Disposition Code 06 - home with home health care                  Continued Care and Services - Admitted Since 12/26/2023    Coordination has not been started for this encounter.       Selected Continued Care - Prior Encounters Includes continued care and service providers with selected services from prior encounters from 9/27/2023 to 12/27/2023      Discharged on 10/28/2023 Admission date: 10/25/2023 - Discharge disposition: Home-Health Care Svc      Home Medical Care       Service Provider Selected Services Address Phone Fax Patient Preferred    [facility] [address] [place] [address] [phone] --                          Expected Discharge Date and Time       Expected Discharge Date Expected Discharge Time    Dec 29, 2023            Demographic Summary       Row Name 12/27/23 1428       General Information    Admission Type inpatient    Arrived From emergency department    Referral Source admission list    Preferred Language English                   Functional Status       Row Name 12/27/23 1428       Functional Status    Usual Activity Tolerance moderate    Current Activity Tolerance moderate       Functional Status, IADL    Medications independent    Meal Preparation independent    Housekeeping independent    Laundry independent    Shopping assistive person                   Psychosocial    No documentation.                  Abuse/Neglect    No documentation.                  Legal    No documentation.                  Substance Abuse    No documentation.                  Patient Forms    No documentation.                     Carmel Martin RN

## 2023-12-27 NOTE — PLAN OF CARE
Goal Outcome Evaluation:      SR/ SB as low as 45bpm. 2LNC. Isolation precautions maintained. Mag replacement in progress per protocol. UA collected this shift. No complaints at this time.

## 2023-12-27 NOTE — PROGRESS NOTES
Patient is current with [facility] for PT. Please forward any resumption of care orders upon d/c to home. Her cert ends 1/2/24 therefore if discharged from hospital after 1/2 then it will be a new referral if home health needed at d/c.  Thank you Ugo DELGADO([facility])Hospital Liaison

## 2023-12-27 NOTE — THERAPY EVALUATION
Patient Name: Sheree Samuel  : 1955    MRN: [id number]                              Today's Date: 2023       Admit Date: 2023    Visit Dx:     ICD-10-CM ICD-9-CM   1. Pneumonia due to COVID-19 virus  U07.1 480.8    J12.82 079.89     Patient Active Problem List   Diagnosis    Coronary artery disease involving native coronary artery of native heart with angina pectoris    Type 2 diabetes mellitus    Chronic pain    Hyperlipidemia LDL goal <70    Localization-related symptomatic epilepsy and epileptic syndromes with simple partial seizures, not intractable, without status epilepticus    Unstable angina    Pneumonia    Arthritis due to other bacteria, unspecified joint    Chronic pulmonary embolism    Essential (primary) hypertension    Primary hypercoagulable state    Presence of unspecified artificial knee joint    Other specified abnormal findings of blood chemistry    Major depressive disorder, single episode, unspecified    Major depressive disorder, recurrent severe without psychotic features    Low back pain    Fall    Urinary tract infection, site not specified    SDH (subdural hematoma)    Memory loss    History of multiple concussions    History of traumatic subdural hematoma    Diplopia    History of sleep apnea    Coronary arteriosclerosis in native artery    Chronic pain disorder    Fibromyalgia    Pulmonary embolism    Impaired functional mobility, balance, gait, and endurance    History of pulmonary embolus (PE)    Deep venous thrombosis    Constipation    Thromboembolism of vein    Depressive disorder    Displacement of cervical intervertebral disc    Equinus contracture of right ankle    Gastroesophageal reflux disease without esophagitis    Generalized anxiety disorder    Hammertoe of right foot    Closed fracture of neck of femur    Closed fracture of distal end of radius    Chronic obstructive lung disease    Chest injury    Bone necrosis    Blood coagulation disorder    Allergic  rhinitis    Asthenia    Acute posthemorrhagic anemia    Long term current use of anticoagulant    Lymphedema praecox    Melanocytic nevus of trunk    Cervical stenosis of spinal canal    Restless legs    Seizure disorder    Senile hyperkeratosis    Tietze's disease    Urge incontinence of urine    Vascular disorder of intestine    Type 2 diabetes mellitus with peripheral neuropathy    Acute cough    COVID-19 virus infection    COVID     Past Medical History:   Diagnosis Date    Anxiety     Arthritis     ASHD (arteriosclerotic heart disease)     Cancer     Cervical disc disorder of mid-cervical region     Chronic pain     Depression     Depression     Diabetes mellitus     Heart disease     Hyperlipidemia     Hypertension     MRSA carrier     Myocardial infarction     Pulmonary embolism      Past Surgical History:   Procedure Laterality Date    BREAST EXCISIONAL BIOPSY Right     x 2    CARDIAC CATHETERIZATION N/A 8/22/2016    Procedure: Left Heart Cath with +/- CBI;  Surgeon: Bernard Palumbo MD;  Location:  [facility];  Service:     CARDIAC CATHETERIZATION Bilateral 7/12/2018    Procedure: Right and Left Heart Cath;  Surgeon: Seth Farnsworth MD;  Location:  [facility];  Service: Cardiovascular    CORONARY ARTERY BYPASS GRAFT      CORONARY ARTERY BYPASS GRAFT      HYSTERECTOMY      OOPHORECTOMY      PAIN PUMP INSERTION/REVISION      PAIN PUMP INSERTION/REVISION      REDUCTION MAMMAPLASTY      early 80's      General Information       Row Name 12/27/23 1302          OT Time and Intention    Document Type evaluation  -KF     Mode of Treatment occupational therapy;individual therapy  -KF       Row Name 12/27/23 1302          General Information    Patient Profile Reviewed yes  -KF     Prior Level of Function independent:;all household mobility;community mobility;bed mobility;ADL's  Patient ambulates with RW or SPC, navigates community distances with electric WC. Pt  reports >5 falls within the last 6 months.  -     Existing Precautions/Restrictions fall;oxygen therapy device and L/min  -     Barriers to Rehab medically complex;previous functional deficit  -       Row Name 12/27/23 1302          Living Environment    People in Home spouse;child(barbara), adult  -       Row Name 12/27/23 1302          Home Main Entrance    Number of Stairs, Main Entrance none  -       Row Name 12/27/23 1302          Stairs Within Home, Primary    Number of Stairs, Within Home, Primary none  -KF     Stairs Comment, Within Home, Primary Pt states she has a walk-in shower with  seat available and standard height commodes.  -       Row Name 12/27/23 1302          Cognition    Orientation Status (Cognition) oriented x 4  -       Row Name 12/27/23 1302          Safety Issues, Functional Mobility    Safety Issues Affecting Function (Mobility) awareness of need for assistance;safety precaution awareness;safety precautions follow-through/compliance;insight into deficits/self-awareness  -     Impairments Affecting Function (Mobility) balance;endurance/activity tolerance;strength;postural/trunk control;pain;shortness of breath  -               User Key  (r) = Recorded By, (t) = Taken By, (c) = Cosigned By      Initials Name Provider Type    KF Mary Ken OT Occupational Therapist                     Mobility/ADL's       Row Name 12/27/23 1304          Bed Mobility    Comment, (Bed Mobility) Pt found and left up in the chair.  -       Row Name 12/27/23 1304          Transfers    Transfers sit-stand transfer;stand-sit transfer  -       Row Name 12/27/23 1304          Sit-Stand Transfer    Sit-Stand Conception (Transfers) contact guard  -     Assistive Device (Sit-Stand Transfers) cane, straight  -       Row Name 12/27/23 1304          Stand-Sit Transfer    Stand-Sit Conception (Transfers) contact guard  -     Assistive Device (Stand-Sit Transfers) cane, straight  -        Row Name 12/27/23 1304          Functional Mobility    Functional Mobility- Ind. Level contact guard assist  -KF     Functional Mobility- Device cane, straight  -KF     Functional Mobility- Comment Pt ambulated within the room to the sink for ADLs, using a SPC with CGA.  -[facility] Name 12/27/23 1304          Activities of Daily Living    BADL Assessment/Intervention lower body dressing;grooming  -KF       Row Name 12/27/23 1304          Lower Body Dressing Assessment/Training    [place] Level (Lower Body Dressing) don;pants/bottoms;shoes/slippers;minimum assist (75% patient effort)  -     Position (Lower Body Dressing) unsupported sitting;supported standing  -[facility] Name 12/27/23 1304          Grooming Assessment/Training    [place] Level (Grooming) oral care regimen;wash face, hands;set up;contact guard assist  -KF     Position (Grooming) sink side;supported standing  -KF               User Key  (r) = Recorded By, (t) = Taken By, (c) = Cosigned By      Initials Name Provider Type    KF Mary Ken OT Occupational Therapist                   Obj/Interventions       [facility] Name 12/27/23 1305          Sensory Assessment (Somatosensory)    Sensory Assessment (Somatosensory) UE sensation intact  -KF       Row Name 12/27/23 1305          Vision Assessment/Intervention    Visual Impairment/Limitations WFL  -[facility] Name 12/27/23 1305          Range of Motion Comprehensive    General Range of Motion bilateral upper extremity ROM WFL  -KF       Row Name 12/27/23 1305          Strength Comprehensive (MMT)    General Manual Muscle Testing (MMT) Assessment upper extremity strength deficits identified  -     Comment, General Manual Muscle Testing (MMT) Assessment BUE grossly 4/5  -[facility] Name 12/27/23 1305          Balance    Balance Assessment sitting static balance;sitting dynamic balance;sit to stand dynamic balance;standing static balance;standing dynamic balance  -     Static Sitting  Balance supervision  -KF     Dynamic Sitting Balance standby assist  -KF     Position, Sitting Balance unsupported;sitting in chair  -KF     Sit to Stand Dynamic Balance contact guard  -KF     Static Standing Balance contact guard  -KF     Dynamic Standing Balance contact guard  -KF     Position/Device Used, Standing Balance supported;cane, straight  -KF     Balance Interventions sitting;standing;sit to stand;supported;static;dynamic;occupation based/functional task  -KF     Comment, Balance No overt LOB, though fatigues quickly  -KF               User Key  (r) = Recorded By, (t) = Taken By, (c) = Cosigned By      Initials Name Provider Type    KF Mary Ken, OT Occupational Therapist                   Goals/Plan       Row Name 12/27/23 1309          Transfer Goal 1 (OT)    Activity/Assistive Device (Transfer Goal 1, OT) commode;bed-to-chair/chair-to-bed  -KF     [place] Level/Cues Needed (Transfer Goal 1, OT) modified independence  -KF     Time Frame (Transfer Goal 1, OT) long term goal (LTG);10 days  -KF     Progress/Outcome (Transfer Goal 1, OT) new goal  -KF       Row Name 12/27/23 1301          Dressing Goal 1 (OT)    Activity/Device (Dressing Goal 1, OT) dressing skills, all  -KF     [place]/Cues Needed (Dressing Goal 1, OT) modified independence  -KF     Time Frame (Dressing Goal 1, OT) long term goal (LTG);10 days  -KF     Progress/Outcome (Dressing Goal 1, OT) new goal  -KF       Row Name 12/27/23 1305          Grooming Goal 1 (OT)    Activity/Device (Grooming Goal 1, OT) grooming skills, all;other (see comments)  sink side  -KF     [place] (Grooming Goal 1, OT) modified independence  -KF     Time Frame (Grooming Goal 1, OT) long term goal (LTG);10 days  -KF     Progress/Outcome (Grooming Goal 1, OT) new goal  -KF       Row Name 12/27/23 1301          Therapy Assessment/Plan (OT)    Planned Therapy Interventions (OT) activity tolerance training;adaptive equipment training;BADL  retraining;functional balance retraining;IADL retraining;occupation/activity based interventions;patient/caregiver education/training;strengthening exercise;transfer/mobility retraining  -KF               User Key  (r) = Recorded By, (t) = Taken By, (c) = Cosigned By      Initials Name Provider Type    KF Mary Ken OT Occupational Therapist                   Clinical Impression       Row Name 12/27/23 1306          Pain Assessment    Pretreatment Pain Rating 6/10  -KF     Posttreatment Pain Rating 6/10  -KF     Pain Location generalized  -KF     Pain Location - back;neck  -KF     Pain Intervention(s) Repositioned;Ambulation/increased activity  -KF       Row Name 12/27/23 1306          Plan of Care Review    Plan of Care Reviewed With patient  -KF     Progress no change  -KF     Outcome Evaluation OT evaluation completed. The pt presents below her functional baseline with generalized weakness, decreased activity tolerance, and mild balance deficits. The pt performed in room ambulation using a SPC with CGA. The pt completed grooming ADLs with set up/CGA while sinkside, needing Rolando to complete LBD. The pt will benefit from continued IP OT services to increase the pt's safety and independence during ADLs and functional mobility. If deemed medically appropriate, recommend a d/c home with assist and HHOT.  -KF       Row Name 12/27/23 1306          Therapy Assessment/Plan (OT)    Patient/Family Therapy Goal Statement (OT) Restore PLOF  -KF     Criteria for Skilled Therapeutic Interventions Met (OT) yes  -KF     Therapy Frequency (OT) daily  -KF       Row Name 12/27/23 1306          Therapy Plan Review/Discharge Plan (OT)    Anticipated Discharge Disposition (OT) home with assist;home with home health  -KF       Row Name 12/27/23 1306          Vital Signs    Pre Systolic BP Rehab 122  -KF     Pre Treatment Diastolic BP 64  -KF     Pretreatment Heart Rate (beats/min) 61  -KF     Pre SpO2 (%) 100  3.5L  -KF     O2  Delivery Pre Treatment nasal cannula  -KF     Pre Patient Position Sitting  -KF     Intra Patient Position Standing  -KF     Post Patient Position Sitting  -KF       Row Name 12/27/23 1306          Positioning and Restraints    Pre-Treatment Position sitting in chair/recliner  -KF     Post Treatment Position chair  -KF     In Chair notified nsg;reclined;call light within reach;encouraged to call for assist;exit alarm on;legs elevated;waffle cushion  -KF               User Key  (r) = Recorded By, (t) = Taken By, (c) = Cosigned By      Initials Name Provider Type    Mary Clifford, BRAD Occupational Therapist                   Outcome Measures       Row Name 12/27/23 1309          How much help from another is currently needed...    Putting on and taking off regular lower body clothing? 3  -KF     Bathing (including washing, rinsing, and drying) 3  -KF     Toileting (which includes using toilet bed pan or urinal) 3  -KF     Putting on and taking off regular upper body clothing 3  -KF     Taking care of personal grooming (such as brushing teeth) 3  -KF     Eating meals 4  -KF     AM-PAC 6 Clicks Score (OT) 19  -KF       Row Name 12/27/23 1055          How much help from another person do you currently need...    Turning from your back to your side while in flat bed without using bedrails? 4  -ML     Moving from lying on back to sitting on the side of a flat bed without bedrails? 4  -ML     Moving to and from a bed to a chair (including a wheelchair)? 3  -ML     Standing up from a chair using your arms (e.g., wheelchair, bedside chair)? 4  -ML     Climbing 3-5 steps with a railing? 3  -ML     To walk in hospital room? 3  -ML     AM-PAC 6 Clicks Score (PT) 21  -ML     Highest Level of Mobility Goal 6 --> Walk 10 steps or more  -ML       Row Name 12/27/23 1309 12/27/23 1055       Functional Assessment    Outcome Measure Options AM-PAC 6 Clicks Daily Activity (OT)  -KF AM-PAC 6 Clicks Basic Mobility (PT)  -ML               User Key  (r) = Recorded By, (t) = Taken By, (c) = Cosigned By      Initials Name Provider Type     Dari Fischer Physical Therapist     Mary Ken OT Occupational Therapist                    Occupational Therapy Education       Title: PT OT SLP Therapies (In Progress)       Topic: Occupational Therapy (In Progress)       Point: ADL training (Done)       Description:   Instruct learner(s) on proper safety adaptation and remediation techniques during self care or transfers.   Instruct in proper use of assistive devices.                  Learning Progress Summary             Patient Acceptance, E,TB, VU,DU by  at 12/27/2023 1055                         Point: Home exercise program (Not Started)       Description:   Instruct learner(s) on appropriate technique for monitoring, assisting and/or progressing therapeutic exercises/activities.                  Learner Progress:  Not documented in this visit.              Point: Precautions (Done)       Description:   Instruct learner(s) on prescribed precautions during self-care and functional transfers.                  Learning Progress Summary             Patient Acceptance, E,TB, VU,DU by  at 12/27/2023 1055                         Point: Body mechanics (Done)       Description:   Instruct learner(s) on proper positioning and spine alignment during self-care, functional mobility activities and/or exercises.                  Learning Progress Summary             Patient Acceptance, E,TB, VU,DU by  at 12/27/2023 1055                                         User Key       Initials Effective Dates Name Provider Type [facility] 08/09/23 -  Mary Ken OT Occupational Therapist OT                  OT Recommendation and Plan  Planned Therapy Interventions (OT): activity tolerance training, adaptive equipment training, BADL retraining, functional balance retraining, IADL retraining, occupation/activity based interventions, patient/caregiver  education/training, strengthening exercise, transfer/mobility retraining  Therapy Frequency (OT): daily  Plan of Care Review  Plan of Care Reviewed With: patient  Progress: no change  Outcome Evaluation: OT evaluation completed. The pt presents below her functional baseline with generalized weakness, decreased activity tolerance, and mild balance deficits. The pt performed in room ambulation using a SPC with CGA. The pt completed grooming ADLs with set up/CGA while sinkside, needing Rolando to complete LBD. The pt will benefit from continued IP OT services to increase the pt's safety and independence during ADLs and functional mobility. If deemed medically appropriate, recommend a d/c home with assist and HHOT.     Time Calculation:   Evaluation Complexity (OT)  Review Occupational Profile/Medical/Therapy History Complexity: expanded/moderate complexity  Assessment, Occupational Performance/Identification of Deficit Complexity: 3-5 performance deficits  Clinical Decision Making Complexity (OT): detailed assessment/moderate complexity  Overall Complexity of Evaluation (OT): moderate complexity     Time Calculation- OT       Row Name 12/27/23 1310             Time Calculation- OT    OT Start Time 1055  -KF      OT Received On 12/27/23  -KF      OT Goal Re-Cert Due Date 01/06/24  -KF         Untimed Charges    OT Eval/Re-eval Minutes 50  -KF         Total Minutes    Untimed Charges Total Minutes 50  -KF       Total Minutes 50  -KF                User Key  (r) = Recorded By, (t) = Taken By, (c) = Cosigned By      Initials Name Provider Type    Mary Clifford OT Occupational Therapist                  Therapy Charges for Today       Code Description Service Date Service Provider Modifiers Qty    [id number] HC OT EVAL MOD COMPLEXITY 4 12/27/2023 Mary Ken OT GO 1                 Mary Ken OT  12/27/2023

## 2023-12-27 NOTE — PLAN OF CARE
Goal Outcome Evaluation:  Plan of Care Reviewed With: patient        Progress: no change  Outcome Evaluation: OT evaluation completed. The pt presents below her functional baseline with generalized weakness, decreased activity tolerance, and mild balance deficits. The pt performed in room ambulation using a SPC with CGA. The pt completed grooming ADLs with set up/CGA while sinkside, needing Rolando to complete LBD. The pt will benefit from continued IP OT services to increase the pt's safety and independence during ADLs and functional mobility. If deemed medically appropriate, recommend a d/c home with assist and HHOT.      Anticipated Discharge Disposition (OT): home with assist, home with home health
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
## 2023-12-28 ENCOUNTER — APPOINTMENT (OUTPATIENT)
Dept: CT IMAGING | Facility: HOSPITAL | Age: 68
End: 2023-12-28
Payer: COMMERCIAL

## 2023-12-28 LAB
ALBUMIN SERPL-MCNC: 2.7 G/DL (ref 3.5–5.2)
ALBUMIN/GLOB SERPL: 0.9 G/DL
ALP SERPL-CCNC: 85 U/L (ref 39–117)
ALT SERPL W P-5'-P-CCNC: 11 U/L (ref 1–33)
ANION GAP SERPL CALCULATED.3IONS-SCNC: 7 MMOL/L (ref 5–15)
AST SERPL-CCNC: 17 U/L (ref 1–32)
BASOPHILS # BLD AUTO: 0.01 10*3/MM3 (ref 0–0.2)
BASOPHILS NFR BLD AUTO: 0.2 % (ref 0–1.5)
BILIRUB SERPL-MCNC: <0.2 MG/DL (ref 0–1.2)
BUN SERPL-MCNC: 22 MG/DL (ref 8–23)
BUN/CREAT SERPL: 28.6 (ref 7–25)
CALCIUM SPEC-SCNC: 8.2 MG/DL (ref 8.6–10.5)
CHLORIDE SERPL-SCNC: 105 MMOL/L (ref 98–107)
CO2 SERPL-SCNC: 29 MMOL/L (ref 22–29)
CREAT SERPL-MCNC: 0.77 MG/DL (ref 0.57–1)
CRP SERPL-MCNC: 3.46 MG/DL (ref 0–0.5)
DEPRECATED RDW RBC AUTO: 45.3 FL (ref 37–54)
EGFRCR SERPLBLD CKD-EPI 2021: 84.1 ML/MIN/1.73
EOSINOPHIL # BLD AUTO: 0.03 10*3/MM3 (ref 0–0.4)
EOSINOPHIL NFR BLD AUTO: 0.5 % (ref 0.3–6.2)
ERYTHROCYTE [DISTWIDTH] IN BLOOD BY AUTOMATED COUNT: 14.3 % (ref 12.3–15.4)
GLOBULIN UR ELPH-MCNC: 3 GM/DL
GLUCOSE BLDC GLUCOMTR-MCNC: 106 MG/DL (ref 70–130)
GLUCOSE BLDC GLUCOMTR-MCNC: 135 MG/DL (ref 70–130)
GLUCOSE BLDC GLUCOMTR-MCNC: 149 MG/DL (ref 70–130)
GLUCOSE BLDC GLUCOMTR-MCNC: 96 MG/DL (ref 70–130)
GLUCOSE SERPL-MCNC: 111 MG/DL (ref 65–99)
HCT VFR BLD AUTO: 27.4 % (ref 34–46.6)
HGB BLD-MCNC: 8.3 G/DL (ref 12–15.9)
IMM GRANULOCYTES # BLD AUTO: 0.04 10*3/MM3 (ref 0–0.05)
IMM GRANULOCYTES NFR BLD AUTO: 0.7 % (ref 0–0.5)
LYMPHOCYTES # BLD AUTO: 1.47 10*3/MM3 (ref 0.7–3.1)
LYMPHOCYTES NFR BLD AUTO: 26.9 % (ref 19.6–45.3)
MAGNESIUM SERPL-MCNC: 1.7 MG/DL (ref 1.6–2.4)
MCH RBC QN AUTO: 26.7 PG (ref 26.6–33)
MCHC RBC AUTO-ENTMCNC: 30.3 G/DL (ref 31.5–35.7)
MCV RBC AUTO: 88.1 FL (ref 79–97)
MONOCYTES # BLD AUTO: 0.33 10*3/MM3 (ref 0.1–0.9)
MONOCYTES NFR BLD AUTO: 6 % (ref 5–12)
NEUTROPHILS NFR BLD AUTO: 3.59 10*3/MM3 (ref 1.7–7)
NEUTROPHILS NFR BLD AUTO: 65.7 % (ref 42.7–76)
NRBC BLD AUTO-RTO: 0 /100 WBC (ref 0–0.2)
PLATELET # BLD AUTO: 201 10*3/MM3 (ref 140–450)
PMV BLD AUTO: 11.5 FL (ref 6–12)
POTASSIUM SERPL-SCNC: 3.9 MMOL/L (ref 3.5–5.2)
PROT SERPL-MCNC: 5.7 G/DL (ref 6–8.5)
RBC # BLD AUTO: 3.11 10*6/MM3 (ref 3.77–5.28)
SODIUM SERPL-SCNC: 141 MMOL/L (ref 136–145)
WBC NRBC COR # BLD AUTO: 5.47 10*3/MM3 (ref 3.4–10.8)

## 2023-12-28 PROCEDURE — 94799 UNLISTED PULMONARY SVC/PX: CPT

## 2023-12-28 PROCEDURE — 83735 ASSAY OF MAGNESIUM: CPT | Performed by: STUDENT IN AN ORGANIZED HEALTH CARE EDUCATION/TRAINING PROGRAM

## 2023-12-28 PROCEDURE — 63710000001 DEXAMETHASONE PER 0.25 MG: Performed by: STUDENT IN AN ORGANIZED HEALTH CARE EDUCATION/TRAINING PROGRAM

## 2023-12-28 PROCEDURE — 94664 DEMO&/EVAL PT USE INHALER: CPT

## 2023-12-28 PROCEDURE — 25010000002 REMDESIVIR 100 MG/20ML SOLUTION 1 EACH VIAL

## 2023-12-28 PROCEDURE — 80053 COMPREHEN METABOLIC PANEL: CPT | Performed by: STUDENT IN AN ORGANIZED HEALTH CARE EDUCATION/TRAINING PROGRAM

## 2023-12-28 PROCEDURE — 25510000001 IOPAMIDOL PER 1 ML: Performed by: INTERNAL MEDICINE

## 2023-12-28 PROCEDURE — 85025 COMPLETE CBC W/AUTO DIFF WBC: CPT | Performed by: INTERNAL MEDICINE

## 2023-12-28 PROCEDURE — 82948 REAGENT STRIP/BLOOD GLUCOSE: CPT

## 2023-12-28 PROCEDURE — 99233 SBSQ HOSP IP/OBS HIGH 50: CPT | Performed by: INTERNAL MEDICINE

## 2023-12-28 PROCEDURE — 86140 C-REACTIVE PROTEIN: CPT | Performed by: INTERNAL MEDICINE

## 2023-12-28 PROCEDURE — 71275 CT ANGIOGRAPHY CHEST: CPT

## 2023-12-28 PROCEDURE — 94640 AIRWAY INHALATION TREATMENT: CPT

## 2023-12-28 PROCEDURE — 25810000003 SODIUM CHLORIDE 0.9 % SOLUTION 250 ML FLEX CONT

## 2023-12-28 RX ORDER — DOXYCYCLINE 100 MG/1
100 CAPSULE ORAL EVERY 12 HOURS SCHEDULED
Status: COMPLETED | OUTPATIENT
Start: 2023-12-28 | End: 2023-12-31

## 2023-12-28 RX ORDER — FAMOTIDINE 20 MG/1
20 TABLET, FILM COATED ORAL DAILY
Status: DISCONTINUED | OUTPATIENT
Start: 2023-12-29 | End: 2023-12-31 | Stop reason: HOSPADM

## 2023-12-28 RX ADMIN — ASPIRIN 81 MG: 81 TABLET, CHEWABLE ORAL at 09:22

## 2023-12-28 RX ADMIN — TRAZODONE HYDROCHLORIDE 150 MG: 100 TABLET ORAL at 21:38

## 2023-12-28 RX ADMIN — Medication 10 ML: at 21:35

## 2023-12-28 RX ADMIN — DOXYCYCLINE 100 MG: 100 INJECTION, POWDER, LYOPHILIZED, FOR SOLUTION INTRAVENOUS at 00:04

## 2023-12-28 RX ADMIN — Medication 1 TABLET: at 09:25

## 2023-12-28 RX ADMIN — APIXABAN 2.5 MG: 2.5 TABLET, FILM COATED ORAL at 21:38

## 2023-12-28 RX ADMIN — GABAPENTIN 300 MG: 300 CAPSULE ORAL at 09:25

## 2023-12-28 RX ADMIN — ALBUTEROL SULFATE 2 PUFF: 90 AEROSOL, METERED RESPIRATORY (INHALATION) at 21:01

## 2023-12-28 RX ADMIN — BUDESONIDE AND FORMOTEROL FUMARATE DIHYDRATE 2 PUFF: 160; 4.5 AEROSOL RESPIRATORY (INHALATION) at 21:01

## 2023-12-28 RX ADMIN — BUSPIRONE HYDROCHLORIDE 7.5 MG: 15 TABLET ORAL at 21:36

## 2023-12-28 RX ADMIN — GABAPENTIN 300 MG: 300 CAPSULE ORAL at 21:37

## 2023-12-28 RX ADMIN — ALBUTEROL SULFATE 2 PUFF: 90 AEROSOL, METERED RESPIRATORY (INHALATION) at 08:18

## 2023-12-28 RX ADMIN — REMDESIVIR 100 MG: 100 INJECTION, POWDER, LYOPHILIZED, FOR SOLUTION INTRAVENOUS at 21:35

## 2023-12-28 RX ADMIN — SERTRALINE HYDROCHLORIDE 200 MG: 100 TABLET ORAL at 09:25

## 2023-12-28 RX ADMIN — CYANOCOBALAMIN TAB 1000 MCG 500 MCG: 1000 TAB at 09:25

## 2023-12-28 RX ADMIN — PRAVASTATIN SODIUM 20 MG: 20 TABLET ORAL at 21:37

## 2023-12-28 RX ADMIN — Medication 10 ML: at 09:25

## 2023-12-28 RX ADMIN — BUSPIRONE HYDROCHLORIDE 7.5 MG: 15 TABLET ORAL at 17:47

## 2023-12-28 RX ADMIN — DOXYCYCLINE 100 MG: 100 CAPSULE ORAL at 17:47

## 2023-12-28 RX ADMIN — BUSPIRONE HYDROCHLORIDE 7.5 MG: 15 TABLET ORAL at 09:22

## 2023-12-28 RX ADMIN — APIXABAN 2.5 MG: 2.5 TABLET, FILM COATED ORAL at 09:22

## 2023-12-28 RX ADMIN — IOPAMIDOL 75 ML: 755 INJECTION, SOLUTION INTRAVENOUS at 10:50

## 2023-12-28 RX ADMIN — DEXAMETHASONE 6 MG: 4 TABLET ORAL at 09:22

## 2023-12-28 RX ADMIN — PRAMIPEXOLE DIHYDROCHLORIDE 0.12 MG: 0.25 TABLET ORAL at 21:37

## 2023-12-28 RX ADMIN — ALBUTEROL SULFATE 2 PUFF: 90 AEROSOL, METERED RESPIRATORY (INHALATION) at 13:05

## 2023-12-28 RX ADMIN — ACETAMINOPHEN 650 MG: 325 TABLET ORAL at 21:36

## 2023-12-28 RX ADMIN — GABAPENTIN 300 MG: 300 CAPSULE ORAL at 17:47

## 2023-12-28 RX ADMIN — BUDESONIDE AND FORMOTEROL FUMARATE DIHYDRATE 2 PUFF: 160; 4.5 AEROSOL RESPIRATORY (INHALATION) at 08:19
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
## 2023-12-28 NOTE — PLAN OF CARE
Goal Outcome Evaluation:    SR/ SB. 1LNC. Isolation precautions maintained. No complaints at this time.

## 2023-12-29 LAB
ALBUMIN SERPL-MCNC: 2.5 G/DL (ref 3.5–5.2)
ALBUMIN/GLOB SERPL: 0.8 G/DL
ALP SERPL-CCNC: 75 U/L (ref 39–117)
ALT SERPL W P-5'-P-CCNC: 11 U/L (ref 1–33)
ANION GAP SERPL CALCULATED.3IONS-SCNC: 6 MMOL/L (ref 5–15)
AST SERPL-CCNC: 16 U/L (ref 1–32)
BASOPHILS # BLD AUTO: 0.01 10*3/MM3 (ref 0–0.2)
BASOPHILS NFR BLD AUTO: 0.2 % (ref 0–1.5)
BILIRUB SERPL-MCNC: 0.2 MG/DL (ref 0–1.2)
BUN SERPL-MCNC: 21 MG/DL (ref 8–23)
BUN/CREAT SERPL: 28.4 (ref 7–25)
CALCIUM SPEC-SCNC: 8.3 MG/DL (ref 8.6–10.5)
CHLORIDE SERPL-SCNC: 102 MMOL/L (ref 98–107)
CO2 SERPL-SCNC: 31 MMOL/L (ref 22–29)
CREAT SERPL-MCNC: 0.74 MG/DL (ref 0.57–1)
CRP SERPL-MCNC: 2.12 MG/DL (ref 0–0.5)
DEPRECATED RDW RBC AUTO: 46.4 FL (ref 37–54)
EGFRCR SERPLBLD CKD-EPI 2021: 88.3 ML/MIN/1.73
EOSINOPHIL # BLD AUTO: 0.01 10*3/MM3 (ref 0–0.4)
EOSINOPHIL NFR BLD AUTO: 0.2 % (ref 0.3–6.2)
ERYTHROCYTE [DISTWIDTH] IN BLOOD BY AUTOMATED COUNT: 14.4 % (ref 12.3–15.4)
GLOBULIN UR ELPH-MCNC: 3.2 GM/DL
GLUCOSE BLDC GLUCOMTR-MCNC: 127 MG/DL (ref 70–130)
GLUCOSE BLDC GLUCOMTR-MCNC: 128 MG/DL (ref 70–130)
GLUCOSE BLDC GLUCOMTR-MCNC: 167 MG/DL (ref 70–130)
GLUCOSE BLDC GLUCOMTR-MCNC: 85 MG/DL (ref 70–130)
GLUCOSE SERPL-MCNC: 86 MG/DL (ref 65–99)
HCT VFR BLD AUTO: 28.4 % (ref 34–46.6)
HGB BLD-MCNC: 8.8 G/DL (ref 12–15.9)
IMM GRANULOCYTES # BLD AUTO: 0.02 10*3/MM3 (ref 0–0.05)
IMM GRANULOCYTES NFR BLD AUTO: 0.4 % (ref 0–0.5)
LYMPHOCYTES # BLD AUTO: 1.92 10*3/MM3 (ref 0.7–3.1)
LYMPHOCYTES NFR BLD AUTO: 34.2 % (ref 19.6–45.3)
MAGNESIUM SERPL-MCNC: 1.5 MG/DL (ref 1.6–2.4)
MCH RBC QN AUTO: 27 PG (ref 26.6–33)
MCHC RBC AUTO-ENTMCNC: 31 G/DL (ref 31.5–35.7)
MCV RBC AUTO: 87.1 FL (ref 79–97)
MONOCYTES # BLD AUTO: 0.33 10*3/MM3 (ref 0.1–0.9)
MONOCYTES NFR BLD AUTO: 5.9 % (ref 5–12)
NEUTROPHILS NFR BLD AUTO: 3.32 10*3/MM3 (ref 1.7–7)
NEUTROPHILS NFR BLD AUTO: 59.1 % (ref 42.7–76)
NRBC BLD AUTO-RTO: 0 /100 WBC (ref 0–0.2)
PLAT MORPH BLD: NORMAL
PLATELET # BLD AUTO: 230 10*3/MM3 (ref 140–450)
PMV BLD AUTO: 11.8 FL (ref 6–12)
POTASSIUM SERPL-SCNC: 4.2 MMOL/L (ref 3.5–5.2)
PROT SERPL-MCNC: 5.7 G/DL (ref 6–8.5)
RBC # BLD AUTO: 3.26 10*6/MM3 (ref 3.77–5.28)
RBC MORPH BLD: NORMAL
SODIUM SERPL-SCNC: 139 MMOL/L (ref 136–145)
WBC MORPH BLD: NORMAL
WBC NRBC COR # BLD AUTO: 5.61 10*3/MM3 (ref 3.4–10.8)

## 2023-12-29 PROCEDURE — 25010000002 REMDESIVIR 100 MG/20ML SOLUTION 1 EACH VIAL

## 2023-12-29 PROCEDURE — 63710000001 DEXAMETHASONE PER 0.25 MG: Performed by: STUDENT IN AN ORGANIZED HEALTH CARE EDUCATION/TRAINING PROGRAM

## 2023-12-29 PROCEDURE — 83735 ASSAY OF MAGNESIUM: CPT | Performed by: STUDENT IN AN ORGANIZED HEALTH CARE EDUCATION/TRAINING PROGRAM

## 2023-12-29 PROCEDURE — 86140 C-REACTIVE PROTEIN: CPT | Performed by: INTERNAL MEDICINE

## 2023-12-29 PROCEDURE — 94799 UNLISTED PULMONARY SVC/PX: CPT

## 2023-12-29 PROCEDURE — 99232 SBSQ HOSP IP/OBS MODERATE 35: CPT | Performed by: INTERNAL MEDICINE

## 2023-12-29 PROCEDURE — 80053 COMPREHEN METABOLIC PANEL: CPT | Performed by: STUDENT IN AN ORGANIZED HEALTH CARE EDUCATION/TRAINING PROGRAM

## 2023-12-29 PROCEDURE — 25010000002 MAGNESIUM SULFATE 2 GM/50ML SOLUTION: Performed by: INTERNAL MEDICINE

## 2023-12-29 PROCEDURE — 97530 THERAPEUTIC ACTIVITIES: CPT

## 2023-12-29 PROCEDURE — 85025 COMPLETE CBC W/AUTO DIFF WBC: CPT | Performed by: INTERNAL MEDICINE

## 2023-12-29 PROCEDURE — 82948 REAGENT STRIP/BLOOD GLUCOSE: CPT

## 2023-12-29 PROCEDURE — 85007 BL SMEAR W/DIFF WBC COUNT: CPT | Performed by: INTERNAL MEDICINE

## 2023-12-29 PROCEDURE — 97535 SELF CARE MNGMENT TRAINING: CPT

## 2023-12-29 PROCEDURE — 25810000003 SODIUM CHLORIDE 0.9 % SOLUTION 250 ML FLEX CONT

## 2023-12-29 PROCEDURE — 63710000001 INSULIN LISPRO (HUMAN) PER 5 UNITS: Performed by: STUDENT IN AN ORGANIZED HEALTH CARE EDUCATION/TRAINING PROGRAM

## 2023-12-29 RX ORDER — MAGNESIUM SULFATE HEPTAHYDRATE 40 MG/ML
2 INJECTION, SOLUTION INTRAVENOUS
Status: COMPLETED | OUTPATIENT
Start: 2023-12-29 | End: 2023-12-29

## 2023-12-29 RX ADMIN — DEXAMETHASONE 6 MG: 4 TABLET ORAL at 08:35

## 2023-12-29 RX ADMIN — GABAPENTIN 300 MG: 300 CAPSULE ORAL at 21:50

## 2023-12-29 RX ADMIN — BUSPIRONE HYDROCHLORIDE 7.5 MG: 15 TABLET ORAL at 15:16

## 2023-12-29 RX ADMIN — ALBUTEROL SULFATE 2 PUFF: 90 AEROSOL, METERED RESPIRATORY (INHALATION) at 16:44

## 2023-12-29 RX ADMIN — PRAVASTATIN SODIUM 20 MG: 20 TABLET ORAL at 21:45

## 2023-12-29 RX ADMIN — NYSTATIN 500000 UNITS: 100000 SUSPENSION ORAL at 21:43

## 2023-12-29 RX ADMIN — NYSTATIN 500000 UNITS: 100000 SUSPENSION ORAL at 17:20

## 2023-12-29 RX ADMIN — ASPIRIN 81 MG: 81 TABLET, CHEWABLE ORAL at 08:37

## 2023-12-29 RX ADMIN — APIXABAN 2.5 MG: 2.5 TABLET, FILM COATED ORAL at 08:37

## 2023-12-29 RX ADMIN — Medication 1 TABLET: at 08:36

## 2023-12-29 RX ADMIN — CYANOCOBALAMIN TAB 1000 MCG 500 MCG: 1000 TAB at 08:36

## 2023-12-29 RX ADMIN — APIXABAN 2.5 MG: 2.5 TABLET, FILM COATED ORAL at 21:48

## 2023-12-29 RX ADMIN — MAGNESIUM SULFATE IN WATER FOR 2 G: 40 INJECTION INTRAVENOUS at 10:57

## 2023-12-29 RX ADMIN — PRAMIPEXOLE DIHYDROCHLORIDE 0.12 MG: 0.25 TABLET ORAL at 21:44

## 2023-12-29 RX ADMIN — Medication 10 ML: at 08:38

## 2023-12-29 RX ADMIN — BUDESONIDE AND FORMOTEROL FUMARATE DIHYDRATE 2 PUFF: 160; 4.5 AEROSOL RESPIRATORY (INHALATION) at 08:55

## 2023-12-29 RX ADMIN — ALBUTEROL SULFATE 2 PUFF: 90 AEROSOL, METERED RESPIRATORY (INHALATION) at 13:25

## 2023-12-29 RX ADMIN — ALBUTEROL SULFATE 2 PUFF: 90 AEROSOL, METERED RESPIRATORY (INHALATION) at 21:09

## 2023-12-29 RX ADMIN — BUSPIRONE HYDROCHLORIDE 7.5 MG: 15 TABLET ORAL at 21:45

## 2023-12-29 RX ADMIN — DOXYCYCLINE 100 MG: 100 CAPSULE ORAL at 21:51

## 2023-12-29 RX ADMIN — GABAPENTIN 300 MG: 300 CAPSULE ORAL at 08:36

## 2023-12-29 RX ADMIN — REMDESIVIR 100 MG: 100 INJECTION, POWDER, LYOPHILIZED, FOR SOLUTION INTRAVENOUS at 21:42

## 2023-12-29 RX ADMIN — NYSTATIN 500000 UNITS: 100000 SUSPENSION ORAL at 13:27

## 2023-12-29 RX ADMIN — SERTRALINE HYDROCHLORIDE 200 MG: 100 TABLET ORAL at 08:35

## 2023-12-29 RX ADMIN — MAGNESIUM SULFATE IN WATER FOR 2 G: 40 INJECTION INTRAVENOUS at 04:59

## 2023-12-29 RX ADMIN — FAMOTIDINE 20 MG: 20 TABLET, FILM COATED ORAL at 08:36

## 2023-12-29 RX ADMIN — INSULIN LISPRO 2 UNITS: 100 INJECTION, SOLUTION INTRAVENOUS; SUBCUTANEOUS at 11:06

## 2023-12-29 RX ADMIN — TRAZODONE HYDROCHLORIDE 150 MG: 100 TABLET ORAL at 21:48

## 2023-12-29 RX ADMIN — BUDESONIDE AND FORMOTEROL FUMARATE DIHYDRATE 2 PUFF: 160; 4.5 AEROSOL RESPIRATORY (INHALATION) at 21:09

## 2023-12-29 RX ADMIN — GABAPENTIN 300 MG: 300 CAPSULE ORAL at 15:16

## 2023-12-29 RX ADMIN — ACETAMINOPHEN 650 MG: 325 TABLET ORAL at 21:46

## 2023-12-29 RX ADMIN — MAGNESIUM SULFATE IN WATER FOR 2 G: 40 INJECTION INTRAVENOUS at 08:38

## 2023-12-29 RX ADMIN — BUSPIRONE HYDROCHLORIDE 7.5 MG: 15 TABLET ORAL at 08:37

## 2023-12-29 RX ADMIN — DOXYCYCLINE 100 MG: 100 CAPSULE ORAL at 08:36

## 2023-12-29 RX ADMIN — ALBUTEROL SULFATE 2 PUFF: 90 AEROSOL, METERED RESPIRATORY (INHALATION) at 08:54

## 2023-12-29 RX ADMIN — Medication 10 ML: at 21:43

## 2023-12-29 NOTE — THERAPY TREATMENT NOTE
Patient Name: Sheree Samuel  : 1955    MRN: [id number]                              Today's Date: 2023       Admit Date: 2023    Visit Dx:     ICD-10-CM ICD-9-CM   1. Pneumonia due to COVID-19 virus  U07.1 480.8    J12.82 079.89     Patient Active Problem List   Diagnosis    Coronary artery disease involving native coronary artery of native heart with angina pectoris    Type 2 diabetes mellitus    Chronic pain    Hyperlipidemia LDL goal <70    Localization-related symptomatic epilepsy and epileptic syndromes with simple partial seizures, not intractable, without status epilepticus    Unstable angina    Pneumonia    Arthritis due to other bacteria, unspecified joint    Chronic pulmonary embolism    Essential (primary) hypertension    Primary hypercoagulable state    Presence of unspecified artificial knee joint    Other specified abnormal findings of blood chemistry    Major depressive disorder, single episode, unspecified    Major depressive disorder, recurrent severe without psychotic features    Low back pain    Fall    Urinary tract infection, site not specified    SDH (subdural hematoma)    Memory loss    History of multiple concussions    History of traumatic subdural hematoma    Diplopia    History of sleep apnea    Coronary arteriosclerosis in native artery    Chronic pain disorder    Fibromyalgia    Pulmonary embolism    Impaired functional mobility, balance, gait, and endurance    History of pulmonary embolus (PE)    Deep venous thrombosis    Constipation    Thromboembolism of vein    Depressive disorder    Displacement of cervical intervertebral disc    Equinus contracture of right ankle    Gastroesophageal reflux disease without esophagitis    Generalized anxiety disorder    Hammertoe of right foot    Closed fracture of neck of femur    Closed fracture of distal end of radius    Chronic obstructive lung disease    Chest injury    Bone necrosis    Blood coagulation disorder    Allergic  rhinitis    Asthenia    Acute posthemorrhagic anemia    Long term current use of anticoagulant    Lymphedema praecox    Melanocytic nevus of trunk    Cervical stenosis of spinal canal    Restless legs    Seizure disorder    Senile hyperkeratosis    Tietze's disease    Urge incontinence of urine    Vascular disorder of intestine    Type 2 diabetes mellitus with peripheral neuropathy    Acute cough    COVID-19 virus infection    COVID     Past Medical History:   Diagnosis Date    Anxiety     Arthritis     ASHD (arteriosclerotic heart disease)     Cancer     Cervical disc disorder of mid-cervical region     Chronic pain     Depression     Depression     Diabetes mellitus     Heart disease     Hyperlipidemia     Hypertension     MRSA carrier     Myocardial infarction     Pulmonary embolism      Past Surgical History:   Procedure Laterality Date    BREAST EXCISIONAL BIOPSY Right     x 2    CARDIAC CATHETERIZATION N/A 8/22/2016    Procedure: Left Heart Cath with +/- CBI;  Surgeon: Bernard Palumbo MD;  Location:  [facility];  Service:     CARDIAC CATHETERIZATION Bilateral 7/12/2018    Procedure: Right and Left Heart Cath;  Surgeon: Seth Farnsworth MD;  Location:  [facility];  Service: Cardiovascular    CORONARY ARTERY BYPASS GRAFT      CORONARY ARTERY BYPASS GRAFT      HYSTERECTOMY      OOPHORECTOMY      PAIN PUMP INSERTION/REVISION      PAIN PUMP INSERTION/REVISION      REDUCTION MAMMAPLASTY      early 80's      General Information       Row Name 12/29/23 1130          OT Time and Intention    Document Type therapy note (daily note)  -AF     Mode of Treatment occupational therapy  -AF       Row Name 12/29/23 1130          General Information    Patient Profile Reviewed yes  -AF     Existing Precautions/Restrictions fall;oxygen therapy device and L/min  -AF     Barriers to Rehab medically complex;previous functional deficit  -AF       Row Name 12/29/23 1133           Cognition    Orientation Status (Cognition) oriented x 4  -AF       Row Name 12/29/23 1130          Safety Issues, Functional Mobility    Impairments Affecting Function (Mobility) balance;endurance/activity tolerance;postural/trunk control;shortness of breath;strength  -AF               User Key  (r) = Recorded By, (t) = Taken By, (c) = Cosigned By      Initials Name Provider Type    AF Carmelita Rausch OT Occupational Therapist                     Mobility/ADL's       Row Name 12/29/23 1131          Bed Mobility    Bed Mobility supine-sit  -AF     Supine-Sit Ruby (Bed Mobility) standby assist  -AF     Assistive Device (Bed Mobility) bed rails;head of bed elevated  -AF       Row Name 12/29/23 1131          Transfers    Transfers sit-stand transfer;stand-sit transfer;toilet transfer  -AF       Row Name 12/29/23 1131          Sit-Stand Transfer    Sit-Stand Ruby (Transfers) supervision  -AF     Assistive Device (Sit-Stand Transfers) cane, straight  -AF       Row Name 12/29/23 1131          Stand-Sit Transfer    Stand-Sit Ruby (Transfers) supervision  -AF     Assistive Device (Stand-Sit Transfers) cane, straight  -AF       Row Name 12/29/23 1131          Toilet Transfer    Type (Toilet Transfer) stand-sit  -AF     Ruby Level (Toilet Transfer) supervision  -AF     Assistive Device (Toilet Transfer) commode;grab bars/safety frame;cane, straight  -AF       Row Name 12/29/23 1131          Functional Mobility    Functional Mobility- Ind. Level contact guard assist  -AF     Functional Mobility- Device cane, straight  -AF     Functional Mobility- Safety Issues balance decreased during turns;supplemental O2  -AF     Functional Mobility- Comment Pt ambulated to restroom for toileting and grooming standing sink side. pt required rest break after ~15 minutes, sat on bedside commode d/t SOA. Pt recovered and was able to ambulate to recliner with CGA.  -AF     Patient was able to Ambulate yes  -AF        Row Name 12/29/23 1131          Activities of Daily Living    BADL Assessment/Intervention grooming;toileting;lower body dressing  -AF       Row Name 12/29/23 1131          Lower Body Dressing Assessment/Training    [place] Level (Lower Body Dressing) don;shoes/slippers;pants/bottoms;supervision  -AF     Position (Lower Body Dressing) unsupported sitting;supported standing  -AF       Row Name 12/29/23 1131          Grooming Assessment/Training    [place] Level (Grooming) oral care regimen;wash face, hands;supervision  -AF     Position (Grooming) sink side;supported standing;supported sitting  -AF       Row Name 12/29/23 1131          Toileting Assessment/Training    [place] Level (Toileting) toileting skills;standby assist  -AF     Assistive Devices (Toileting) commode;grab bar/safety frame  -AF     Position (Toileting) supported sitting;supported standing  -AF               User Key  (r) = Recorded By, (t) = Taken By, (c) = Cosigned By      Initials Name Provider Type    Carmelita Lee OT Occupational Therapist                   Obj/Interventions       Row Name 12/29/23 1133          Motor Skills    Motor Skills functional endurance  -AF     Functional Endurance Pt w/ poor endurance on this date. During grooming while standing at sink pt became SOA and sat on BSC for ~10 minutes. Once recovered pt ambulated to recliner w/ CGA.  -AF       Row Name 12/29/23 1133          Balance    Balance Assessment sitting static balance;sitting dynamic balance;sit to stand dynamic balance;standing static balance;standing dynamic balance  -AF     Static Sitting Balance independent  -AF     Dynamic Sitting Balance independent  -AF     Position, Sitting Balance unsupported;sitting edge of bed  -AF     Sit to Stand Dynamic Balance supervision  -AF     Static Standing Balance supervision  -AF     Dynamic Standing Balance contact guard  -AF     Position/Device Used, Standing Balance supported;cane, straight  -AF      Balance Interventions sitting;standing;sit to stand;supported;static;dynamic;occupation based/functional task  -AF     Comment, Balance fatigues quickly, 1 LOB d/t fatigue  -AF               User Key  (r) = Recorded By, (t) = Taken By, (c) = Cosigned By      Initials Name Provider Type    Carmelita Lee OT Occupational Therapist                   Goals/Plan       Row Name 12/29/23 1136          Therapy Assessment/Plan (OT)    Planned Therapy Interventions (OT) activity tolerance training;adaptive equipment training;BADL retraining;functional balance retraining;occupation/activity based interventions;patient/caregiver education/training;ROM/therapeutic exercise;strengthening exercise;transfer/mobility retraining  -AF               User Key  (r) = Recorded By, (t) = Taken By, (c) = Cosigned By      Initials Name Provider Type    AF Carmelita Rausch OT Occupational Therapist                   Clinical Impression       Row Name 12/29/23 1134          Pain Assessment    Pretreatment Pain Rating 2/10  -AF     Posttreatment Pain Rating 2/10  -AF     Pain Location generalized  -AF     Pre/Posttreatment Pain Comment pt tolerated, reported chronic BLE/back pain  -AF       Row Name 12/29/23 1138          Plan of Care Review    Plan of Care Reviewed With patient  -AF     Progress no change  -AF     Outcome Evaluation The pt presents below her functional baseline with generalized weakness, decreased activity tolerance, and mild balance deficits. The pt performed in room ambulation using a SPC with CGA. The pt completed grooming ADLs with set up/CGA while sinkside, completed toilieting hygiene and clothing mngt w/ SBA on this date. The pt will benefit from continued IP OT services to increase the pt's safety and independence during ADLs and functional mobility. If deemed medically appropriate, recommend a d/c home with assist and HHOT.  -AF       Row Name 12/29/23 113          Therapy Assessment/Plan (OT)    Rehab Potential  (OT) good, to achieve stated therapy goals  -AF     Criteria for Skilled Therapeutic Interventions Met (OT) yes;meets criteria;skilled treatment is necessary  -AF     Therapy Frequency (OT) daily  -AF       Row Name 12/29/23 1134          Therapy Plan Review/Discharge Plan (OT)    Anticipated Discharge Disposition (OT) home with assist;home with home health  -AF       Row Name 12/29/23 1134          Vital Signs    Pre SpO2 (%) 99  -AF     O2 Delivery Pre Treatment nasal cannula  -AF     Intra SpO2 (%) 92  -AF     O2 Delivery Intra Treatment nasal cannula  -AF     Post SpO2 (%) 99  -AF     O2 Delivery Post Treatment nasal cannula  -AF     Pre Patient Position Supine  -AF     Intra Patient Position Standing  -AF     Post Patient Position Sitting  -AF       Row Name 12/29/23 1134          Positioning and Restraints    Pre-Treatment Position in bed  -AF     Post Treatment Position chair  -AF     In Chair notified nsg;reclined;waffle cushion;sitting;call light within reach;encouraged to call for assist;exit alarm on;legs elevated  -AF               User Key  (r) = Recorded By, (t) = Taken By, (c) = Cosigned By      Initials Name Provider Type    AF Carmelita Rausch, BRAD Occupational Therapist                   Outcome Measures       Row Name 12/29/23 1137          How much help from another is currently needed...    Putting on and taking off regular lower body clothing? 3  -AF     Bathing (including washing, rinsing, and drying) 3  -AF     Toileting (which includes using toilet bed pan or urinal) 4  -AF     Putting on and taking off regular upper body clothing 3  -AF     Taking care of personal grooming (such as brushing teeth) 4  -AF     Eating meals 4  -AF     AM-PAC 6 Clicks Score (OT) 21  -AF       Row Name 12/29/23 1137          Functional Assessment    Outcome Measure Options AM-PAC 6 Clicks Daily Activity (OT)  -AF               User Key  (r) = Recorded By, (t) = Taken By, (c) = Cosigned By      Initials Name Provider  Type    AF Carmelita Rausch OT Occupational Therapist                    Occupational Therapy Education       Title: PT OT SLP Therapies (In Progress)       Topic: Occupational Therapy (In Progress)       Point: ADL training (Done)       Description:   Instruct learner(s) on proper safety adaptation and remediation techniques during self care or transfers.   Instruct in proper use of assistive devices.                  Learning Progress Summary             Patient Acceptance, E,TB, VU by  at 12/29/2023 1137    Acceptance, E,TB, VU,DU by  at 12/27/2023 1055                         Point: Home exercise program (Not Started)       Description:   Instruct learner(s) on appropriate technique for monitoring, assisting and/or progressing therapeutic exercises/activities.                  Learner Progress:  Not documented in this visit.              Point: Precautions (Done)       Description:   Instruct learner(s) on prescribed precautions during self-care and functional transfers.                  Learning Progress Summary             Patient Acceptance, E,TB, VU,DU by  at 12/27/2023 1055                         Point: Body mechanics (Done)       Description:   Instruct learner(s) on proper positioning and spine alignment during self-care, functional mobility activities and/or exercises.                  Learning Progress Summary             Patient Acceptance, E,TB, VU by  at 12/29/2023 1137    Acceptance, E,TB, VU,DU by  at 12/27/2023 1055                                         User Key       Initials Effective Dates Name Provider Type Discipline     08/09/23 -  Mary Ken OT Occupational Therapist OT     08/15/23 -  Carmelita Rausch OT Occupational Therapist OT                  OT Recommendation and Plan  Planned Therapy Interventions (OT): activity tolerance training, adaptive equipment training, BADL retraining, functional balance retraining, occupation/activity based interventions, patient/caregiver  education/training, ROM/therapeutic exercise, strengthening exercise, transfer/mobility retraining  Therapy Frequency (OT): daily  Plan of Care Review  Plan of Care Reviewed With: patient  Progress: no change  Outcome Evaluation: The pt presents below her functional baseline with generalized weakness, decreased activity tolerance, and mild balance deficits. The pt performed in room ambulation using a SPC with CGA. The pt completed grooming ADLs with set up/CGA while sinkside, completed toilieting hygiene and clothing mngt w/ SBA on this date. The pt will benefit from continued IP OT services to increase the pt's safety and independence during ADLs and functional mobility. If deemed medically appropriate, recommend a d/c home with assist and HHOT.     Time Calculation:         Time Calculation- OT       Row Name 12/29/23 1137             Time Calculation- OT    OT Start Time 1000  -AF      OT Received On 12/29/23  -AF      OT Goal Re-Cert Due Date 01/06/23  -AF         Timed Charges    51992 - OT Therapeutic Activity Minutes 15  -AF      64300 - OT Self Care/Mgmt Minutes 30  -AF         Total Minutes    Timed Charges Total Minutes 45  -AF       Total Minutes 45  -AF                User Key  (r) = Recorded By, (t) = Taken By, (c) = Cosigned By      Initials Name Provider Type    AF Carmelita Rausch OT Occupational Therapist                  Therapy Charges for Today       Code Description Service Date Service Provider Modifiers Qty    41479843586 HC OT SELF CARE/MGMT/TRAIN EA 15 MIN 12/29/2023 Carmelita Rausch OT GO 2    02593633519 HC OT THERAPEUTIC ACT EA 15 MIN 12/29/2023 Carmelita Rausch OT GO 1                 Carmelita Rausch OT  12/29/2023

## 2023-12-29 NOTE — CASE MANAGEMENT/SOCIAL WORK
Continued Stay Note   Edyta     Patient Name: Sheree Samuel  MRN: [id number]  Today's Date: 12/29/2023    Admit Date: 12/26/2023    Plan: Home with home health   Discharge Plan       Row Name 12/29/23 1322       Plan    Plan Home with home health    Patient/Family in Agreement with Plan yes    Plan Comments Discussed patient in MDR.  Possible discharge Sunday after completion of remdesivir. Updated patient and spouse Alessio. Her plan is still home with [facility] for PT. Updated [facility] on possible Sunday discharge. Home health order is in [facility]. Confirmed family will transport. Primary nurse to get a resting room air sat today to see if patient qualifies for home oxygen, if needed at time of discharge. CM will continue to follow.    Final Discharge Disposition Code 06 - home with home health care                   Discharge Codes    No documentation.                 Expected Discharge Date and Time       Expected Discharge Date Expected Discharge Time    Dec 30, 2023               Carmel Martin RN

## 2023-12-29 NOTE — PLAN OF CARE
Goal Outcome Evaluation:  Plan of Care Reviewed With: patient        Progress: no change  Outcome Evaluation: The pt presents below her functional baseline with generalized weakness, decreased activity tolerance, and mild balance deficits. The pt performed in room ambulation using a SPC with CGA. The pt completed grooming ADLs with set up/CGA while sinkside, completed toilieting hygiene and clothing mngt w/ SBA on this date. The pt will benefit from continued IP OT services to increase the pt's safety and independence during ADLs and functional mobility. If deemed medically appropriate, recommend a d/c home with assist and HHOT.      Anticipated Discharge Disposition (OT): home with assist, home with home health
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
## 2023-12-29 NOTE — PLAN OF CARE
Goal Outcome Evaluation:              Outcome Evaluation: Pt is A&O with VSS, NSR to sinus arpit when asleep on the monitor, and on 1L NC. Her magnesium this morning was 1.5 so I started the replacement for it. There are no complaints at this time.

## 2023-12-30 LAB
ALBUMIN SERPL-MCNC: 2.6 G/DL (ref 3.5–5.2)
ALBUMIN/GLOB SERPL: 0.8 G/DL
ALP SERPL-CCNC: 87 U/L (ref 39–117)
ALT SERPL W P-5'-P-CCNC: 11 U/L (ref 1–33)
ANION GAP SERPL CALCULATED.3IONS-SCNC: 6 MMOL/L (ref 5–15)
AST SERPL-CCNC: 14 U/L (ref 1–32)
BASOPHILS # BLD AUTO: 0.01 10*3/MM3 (ref 0–0.2)
BASOPHILS NFR BLD AUTO: 0.2 % (ref 0–1.5)
BILIRUB SERPL-MCNC: <0.2 MG/DL (ref 0–1.2)
BUN SERPL-MCNC: 25 MG/DL (ref 8–23)
BUN/CREAT SERPL: 31.6 (ref 7–25)
CALCIUM SPEC-SCNC: 8.2 MG/DL (ref 8.6–10.5)
CHLORIDE SERPL-SCNC: 103 MMOL/L (ref 98–107)
CO2 SERPL-SCNC: 32 MMOL/L (ref 22–29)
CREAT SERPL-MCNC: 0.79 MG/DL (ref 0.57–1)
CRP SERPL-MCNC: 1.51 MG/DL (ref 0–0.5)
DEPRECATED RDW RBC AUTO: 47.3 FL (ref 37–54)
EGFRCR SERPLBLD CKD-EPI 2021: 81.6 ML/MIN/1.73
EOSINOPHIL # BLD AUTO: 0.03 10*3/MM3 (ref 0–0.4)
EOSINOPHIL NFR BLD AUTO: 0.5 % (ref 0.3–6.2)
ERYTHROCYTE [DISTWIDTH] IN BLOOD BY AUTOMATED COUNT: 14.8 % (ref 12.3–15.4)
GLOBULIN UR ELPH-MCNC: 3.3 GM/DL
GLUCOSE BLDC GLUCOMTR-MCNC: 111 MG/DL (ref 70–130)
GLUCOSE BLDC GLUCOMTR-MCNC: 139 MG/DL (ref 70–130)
GLUCOSE BLDC GLUCOMTR-MCNC: 83 MG/DL (ref 70–130)
GLUCOSE BLDC GLUCOMTR-MCNC: 88 MG/DL (ref 70–130)
GLUCOSE SERPL-MCNC: 135 MG/DL (ref 65–99)
HCT VFR BLD AUTO: 29.6 % (ref 34–46.6)
HGB BLD-MCNC: 9.1 G/DL (ref 12–15.9)
IMM GRANULOCYTES # BLD AUTO: 0.04 10*3/MM3 (ref 0–0.05)
IMM GRANULOCYTES NFR BLD AUTO: 0.6 % (ref 0–0.5)
LYMPHOCYTES # BLD AUTO: 2.08 10*3/MM3 (ref 0.7–3.1)
LYMPHOCYTES NFR BLD AUTO: 33.7 % (ref 19.6–45.3)
MAGNESIUM SERPL-MCNC: 2.3 MG/DL (ref 1.6–2.4)
MCH RBC QN AUTO: 26.8 PG (ref 26.6–33)
MCHC RBC AUTO-ENTMCNC: 30.7 G/DL (ref 31.5–35.7)
MCV RBC AUTO: 87.1 FL (ref 79–97)
MONOCYTES # BLD AUTO: 0.41 10*3/MM3 (ref 0.1–0.9)
MONOCYTES NFR BLD AUTO: 6.6 % (ref 5–12)
NEUTROPHILS NFR BLD AUTO: 3.61 10*3/MM3 (ref 1.7–7)
NEUTROPHILS NFR BLD AUTO: 58.4 % (ref 42.7–76)
NRBC BLD AUTO-RTO: 0 /100 WBC (ref 0–0.2)
PLATELET # BLD AUTO: 252 10*3/MM3 (ref 140–450)
PMV BLD AUTO: 11.6 FL (ref 6–12)
POTASSIUM SERPL-SCNC: 4.3 MMOL/L (ref 3.5–5.2)
PROT SERPL-MCNC: 5.9 G/DL (ref 6–8.5)
RBC # BLD AUTO: 3.4 10*6/MM3 (ref 3.77–5.28)
SODIUM SERPL-SCNC: 141 MMOL/L (ref 136–145)
WBC NRBC COR # BLD AUTO: 6.18 10*3/MM3 (ref 3.4–10.8)

## 2023-12-30 PROCEDURE — 63710000001 DEXAMETHASONE PER 0.25 MG: Performed by: STUDENT IN AN ORGANIZED HEALTH CARE EDUCATION/TRAINING PROGRAM

## 2023-12-30 PROCEDURE — 82948 REAGENT STRIP/BLOOD GLUCOSE: CPT

## 2023-12-30 PROCEDURE — 25810000003 SODIUM CHLORIDE 0.9 % SOLUTION 250 ML FLEX CONT

## 2023-12-30 PROCEDURE — 97530 THERAPEUTIC ACTIVITIES: CPT

## 2023-12-30 PROCEDURE — 86140 C-REACTIVE PROTEIN: CPT | Performed by: INTERNAL MEDICINE

## 2023-12-30 PROCEDURE — 83735 ASSAY OF MAGNESIUM: CPT | Performed by: INTERNAL MEDICINE

## 2023-12-30 PROCEDURE — 85025 COMPLETE CBC W/AUTO DIFF WBC: CPT | Performed by: INTERNAL MEDICINE

## 2023-12-30 PROCEDURE — 25010000002 REMDESIVIR 100 MG/20ML SOLUTION 1 EACH VIAL

## 2023-12-30 PROCEDURE — 94799 UNLISTED PULMONARY SVC/PX: CPT

## 2023-12-30 PROCEDURE — 80053 COMPREHEN METABOLIC PANEL: CPT | Performed by: STUDENT IN AN ORGANIZED HEALTH CARE EDUCATION/TRAINING PROGRAM

## 2023-12-30 PROCEDURE — 99233 SBSQ HOSP IP/OBS HIGH 50: CPT | Performed by: INTERNAL MEDICINE

## 2023-12-30 PROCEDURE — 97116 GAIT TRAINING THERAPY: CPT

## 2023-12-30 RX ORDER — ALUMINA, MAGNESIA, AND SIMETHICONE 2400; 2400; 240 MG/30ML; MG/30ML; MG/30ML
30 SUSPENSION ORAL EVERY 6 HOURS PRN
Status: DISCONTINUED | OUTPATIENT
Start: 2023-12-30 | End: 2023-12-31 | Stop reason: HOSPADM

## 2023-12-30 RX ORDER — HYDRALAZINE HYDROCHLORIDE 10 MG/1
10 TABLET, FILM COATED ORAL EVERY 12 HOURS SCHEDULED
Status: DISCONTINUED | OUTPATIENT
Start: 2023-12-30 | End: 2023-12-31 | Stop reason: HOSPADM

## 2023-12-30 RX ORDER — PANTOPRAZOLE SODIUM 40 MG/1
40 TABLET, DELAYED RELEASE ORAL
Status: DISCONTINUED | OUTPATIENT
Start: 2023-12-30 | End: 2023-12-31 | Stop reason: HOSPADM

## 2023-12-30 RX ADMIN — HYDRALAZINE HYDROCHLORIDE 10 MG: 10 TABLET ORAL at 20:27

## 2023-12-30 RX ADMIN — Medication 10 ML: at 20:30

## 2023-12-30 RX ADMIN — TRAZODONE HYDROCHLORIDE 150 MG: 100 TABLET ORAL at 20:27

## 2023-12-30 RX ADMIN — SERTRALINE HYDROCHLORIDE 200 MG: 100 TABLET ORAL at 08:53

## 2023-12-30 RX ADMIN — PRAVASTATIN SODIUM 20 MG: 20 TABLET ORAL at 20:27

## 2023-12-30 RX ADMIN — REMDESIVIR 100 MG: 100 INJECTION, POWDER, LYOPHILIZED, FOR SOLUTION INTRAVENOUS at 20:26

## 2023-12-30 RX ADMIN — ASPIRIN 81 MG: 81 TABLET, CHEWABLE ORAL at 08:54

## 2023-12-30 RX ADMIN — GABAPENTIN 300 MG: 300 CAPSULE ORAL at 16:00

## 2023-12-30 RX ADMIN — NYSTATIN 500000 UNITS: 100000 SUSPENSION ORAL at 20:26

## 2023-12-30 RX ADMIN — CYANOCOBALAMIN TAB 1000 MCG 500 MCG: 1000 TAB at 08:53

## 2023-12-30 RX ADMIN — NYSTATIN 500000 UNITS: 100000 SUSPENSION ORAL at 18:00

## 2023-12-30 RX ADMIN — ALBUTEROL SULFATE 2 PUFF: 90 AEROSOL, METERED RESPIRATORY (INHALATION) at 08:06

## 2023-12-30 RX ADMIN — DOXYCYCLINE 100 MG: 100 CAPSULE ORAL at 08:53

## 2023-12-30 RX ADMIN — GABAPENTIN 300 MG: 300 CAPSULE ORAL at 20:26

## 2023-12-30 RX ADMIN — ACETAMINOPHEN 650 MG: 325 TABLET ORAL at 20:27

## 2023-12-30 RX ADMIN — BUSPIRONE HYDROCHLORIDE 7.5 MG: 15 TABLET ORAL at 14:00

## 2023-12-30 RX ADMIN — BUSPIRONE HYDROCHLORIDE 7.5 MG: 15 TABLET ORAL at 20:27

## 2023-12-30 RX ADMIN — BUDESONIDE AND FORMOTEROL FUMARATE DIHYDRATE 2 PUFF: 160; 4.5 AEROSOL RESPIRATORY (INHALATION) at 08:07

## 2023-12-30 RX ADMIN — ALBUTEROL SULFATE 2 PUFF: 90 AEROSOL, METERED RESPIRATORY (INHALATION) at 12:22

## 2023-12-30 RX ADMIN — DEXAMETHASONE 6 MG: 4 TABLET ORAL at 08:53

## 2023-12-30 RX ADMIN — ALBUTEROL SULFATE 2 PUFF: 90 AEROSOL, METERED RESPIRATORY (INHALATION) at 16:23

## 2023-12-30 RX ADMIN — FAMOTIDINE 20 MG: 20 TABLET, FILM COATED ORAL at 08:53

## 2023-12-30 RX ADMIN — GABAPENTIN 300 MG: 300 CAPSULE ORAL at 08:54

## 2023-12-30 RX ADMIN — BUSPIRONE HYDROCHLORIDE 7.5 MG: 15 TABLET ORAL at 08:54

## 2023-12-30 RX ADMIN — PRAMIPEXOLE DIHYDROCHLORIDE 0.12 MG: 0.25 TABLET ORAL at 20:29

## 2023-12-30 RX ADMIN — SENNOSIDES AND DOCUSATE SODIUM 2 TABLET: 8.6; 5 TABLET ORAL at 08:53

## 2023-12-30 RX ADMIN — APIXABAN 2.5 MG: 2.5 TABLET, FILM COATED ORAL at 08:53

## 2023-12-30 RX ADMIN — ALBUTEROL SULFATE 2 PUFF: 90 AEROSOL, METERED RESPIRATORY (INHALATION) at 19:52

## 2023-12-30 RX ADMIN — DOXYCYCLINE 100 MG: 100 CAPSULE ORAL at 20:30

## 2023-12-30 RX ADMIN — APIXABAN 2.5 MG: 2.5 TABLET, FILM COATED ORAL at 20:27

## 2023-12-30 RX ADMIN — Medication 10 ML: at 08:55

## 2023-12-30 RX ADMIN — Medication 1 TABLET: at 08:54
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
## 2023-12-30 NOTE — CASE MANAGEMENT/SOCIAL WORK
Case Management Discharge Note      Final Note: Pt is being discharged today and plan is home with spouse and HH PT resumed with [facility]. I have notified Blanca in intake at [facility] of pt's discharge today. [facility] will be in contact with pt to resume HH visits.  I spoke with pt on the phone regarding discharge plan. She is aware that [facility] will be in contact with her to resume HH visits. Pt reports she has transportation home and denies further discharge needs.         Selected Continued Care - Admitted Since 12/26/2023       Destination    No services have been selected for the patient.                Durable Medical Equipment    No services have been selected for the patient.                Dialysis/Infusion    No services have been selected for the patient.                Home Medical Care       Service Provider Selected Services Address Phone Fax Patient Preferred    [facility] [address] [address] [phone] --              Therapy    No services have been selected for the patient.                Community Resources    No services have been selected for the patient.                [facility]    No services have been selected for the patient.                    Selected Continued Care - Prior Encounters Includes continued care and service providers with selected services from prior encounters from 9/27/2023 to 12/30/2023      Discharged on 10/28/2023 Admission date: 10/25/2023 - Discharge disposition: Home-Health Care [facility]      Home Medical Care       Service Provider Selected Services Address Phone Fax Patient Preferred    [facility] [address] [address] [phone] --                               Final Discharge Disposition Code: 06 - home with home health care

## 2023-12-30 NOTE — PLAN OF CARE
Goal Outcome Evaluation:           Progress: improving  Outcome Evaluation: Pt is A&O with VSS, NSR to sinus arpit when asleep on the monitor, and on 1L NC. Her magnesium this morning was 2.3 so it didn't need replaced again. She complained of a headache that was relieved with PRN tylenol. She slept better last night and there are no complaints at this time.

## 2023-12-30 NOTE — PLAN OF CARE
Goal Outcome Evaluation:  Plan of Care Reviewed With: patient        Progress: improving  Outcome Evaluation: Pt amb within room with cane with shuffling gait, narrow CHRIS, and slow speed, and participated in static and dynamic standing balance activities at sink. No overt LOB. O2 sat stable on 2LO2nc. Pt pleasant and cooperative, remains below baseline level of function for mobility.      Anticipated Discharge Disposition (PT): home with assist, home with home health
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
## 2023-12-30 NOTE — THERAPY TREATMENT NOTE
Patient Name: Sheree Samuel  : 1955    MRN: [id number]                              Today's Date: 2023       Admit Date: 2023    Visit Dx:     ICD-10-CM ICD-9-CM   1. Pneumonia due to COVID-19 virus  U07.1 480.8    J12.82 079.89   2. Coronary artery disease involving native coronary artery of native heart with angina pectoris  I25.119 414.01     413.9     Patient Active Problem List   Diagnosis    Coronary artery disease involving native coronary artery of native heart with angina pectoris    Type 2 diabetes mellitus    Chronic pain    Hyperlipidemia LDL goal <70    Localization-related symptomatic epilepsy and epileptic syndromes with simple partial seizures, not intractable, without status epilepticus    Unstable angina    Pneumonia    Arthritis due to other bacteria, unspecified joint    Chronic pulmonary embolism    Essential (primary) hypertension    Primary hypercoagulable state    Presence of unspecified artificial knee joint    Other specified abnormal findings of blood chemistry    Major depressive disorder, single episode, unspecified    Major depressive disorder, recurrent severe without psychotic features    Low back pain    Fall    Urinary tract infection, site not specified    SDH (subdural hematoma)    Memory loss    History of multiple concussions    History of traumatic subdural hematoma    Diplopia    History of sleep apnea    Coronary arteriosclerosis in native artery    Chronic pain disorder    Fibromyalgia    Pulmonary embolism    Impaired functional mobility, balance, gait, and endurance    History of pulmonary embolus (PE)    Deep venous thrombosis    Constipation    Thromboembolism of vein    Depressive disorder    Displacement of cervical intervertebral disc    Equinus contracture of right ankle    Gastroesophageal reflux disease without esophagitis    Generalized anxiety disorder    Hammertoe of right foot    Closed fracture of neck of femur    Closed fracture of distal  end of radius    Chronic obstructive lung disease    Chest injury    Bone necrosis    Blood coagulation disorder    Allergic rhinitis    Asthenia    Acute posthemorrhagic anemia    Long term current use of anticoagulant    Lymphedema praecox    Melanocytic nevus of trunk    Cervical stenosis of spinal canal    Restless legs    Seizure disorder    Senile hyperkeratosis    Tietze's disease    Urge incontinence of urine    Vascular disorder of intestine    Type 2 diabetes mellitus with peripheral neuropathy    Acute cough    COVID-19 virus infection    COVID     Past Medical History:   Diagnosis Date    Anxiety     Arthritis     ASHD (arteriosclerotic heart disease)     Cancer     Cervical disc disorder of mid-cervical region     Chronic pain     Depression     Depression     Diabetes mellitus     Heart disease     Hyperlipidemia     Hypertension     MRSA carrier     Myocardial infarction     Pulmonary embolism      Past Surgical History:   Procedure Laterality Date    BREAST EXCISIONAL BIOPSY Right     x 2    CARDIAC CATHETERIZATION N/A 8/22/2016    Procedure: Left Heart Cath with +/- CBI;  Surgeon: Bernard Palumbo MD;  Location:  [facility];  Service:     CARDIAC CATHETERIZATION Bilateral 7/12/2018    Procedure: Right and Left Heart Cath;  Surgeon: Seth Farnsworth MD;  Location:  [facility];  Service: Cardiovascular    CORONARY ARTERY BYPASS GRAFT      CORONARY ARTERY BYPASS GRAFT      HYSTERECTOMY      OOPHORECTOMY      PAIN PUMP INSERTION/REVISION      PAIN PUMP INSERTION/REVISION      REDUCTION MAMMAPLASTY      early 80's      General Information       Row Name 12/30/23 1515          Physical Therapy Time and Intention    Document Type therapy note (daily note)  -SD     Mode of Treatment physical therapy  -SD       Row Name 12/30/23 1515          General Information    Existing Precautions/Restrictions fall;oxygen therapy device and L/min  -SD       Row Name  12/30/23 1515          Cognition    Orientation Status (Cognition) oriented x 4  -SD       Row Name 12/30/23 1515          Safety Issues, Functional Mobility    Safety Issues Affecting Function (Mobility) safety precautions follow-through/compliance;judgment;positioning of assistive device  -SD     Impairments Affecting Function (Mobility) balance;endurance/activity tolerance;postural/trunk control;shortness of breath;strength;range of motion (ROM)  -SD               User Key  (r) = Recorded By, (t) = Taken By, (c) = Cosigned By      Initials Name Provider Type    SD Valerie Esparza, PT Physical Therapist                   Mobility       Row Name 12/30/23 1605          Bed Mobility    Bed Mobility supine-sit  -SD     Supine-Sit [place] (Bed Mobility) modified independence  -SD     Assistive Device (Bed Mobility) bed rails;head of bed elevated  -SD     Comment, (Bed Mobility) pt able to complete on her own with extra time and effort  -SD       Row Name 12/30/23 1605          Transfers    Comment, (Transfers) pt able to t/f sit to stand from EOB, bathroom toilet, and recliner without difficulty  -SD       Row Name 12/30/23 1605          Sit-Stand Transfer    Sit-Stand [place] (Transfers) modified independence  -SD     Assistive Device (Sit-Stand Transfers) cane, straight  -SD       Row Name 12/30/23 1605          Gait/Stairs (Locomotion)    [place] Level (Gait) standby assist  -SD     Assistive Device (Gait) cane, straight  -SD     Distance in Feet (Gait) 62  -SD     Deviations/Abnormal Patterns (Gait) bilateral deviations;kita decreased;gait speed decreased;base of support, narrow;festinating/shuffling  -SD     Bilateral Gait Deviations forward flexed posture;heel strike decreased  -SD     Comment, (Gait/Stairs) Pt amb within room with cane with shuffling gait, narrow CHRIS, and slow speed, however no overt LOB. O2 sat stable on 2LO2nc.  -SD               User Key  (r) = Recorded By, (t) = Taken  By, (c) = Cosigned By      Initials Name Provider Type    Valerie Arellano PT Physical Therapist                   Obj/Interventions       Row Name 12/30/23 1607          Balance    Balance Assessment sitting static balance;sitting dynamic balance;standing static balance;standing dynamic balance  -SD     Static Sitting Balance independent  -SD     Dynamic Sitting Balance independent  -SD     Position, Sitting Balance unsupported;sitting edge of bed;other (see comments)  bathroom toilet  -SD     Static Standing Balance supervision  -SD     Dynamic Standing Balance contact guard  -SD     Position/Device Used, Standing Balance supported;cane, straight;unsupported  -SD     Balance Interventions sitting;standing;supported;static;dynamic;occupation based/functional task  -SD     Comment, Balance prolonged static and dynamic standing balance activities at sink, no LOB, occasional UE support on sink  -SD               User Key  (r) = Recorded By, (t) = Taken By, (c) = Cosigned By      Initials Name Provider Type    Valerie Arellano PT Physical Therapist                   Goals/Plan    No documentation.                  Clinical Impression       Row Name 12/30/23 1609          Pain    Pretreatment Pain Rating 2/10  -SD     Posttreatment Pain Rating 2/10  -SD     Pain Location lower  -SD     Pain Location - back  -SD     Pre/Posttreatment Pain Comment pt reports chronic pain  -SD     Pain Intervention(s) Repositioned;Ambulation/increased activity;Environmental changes  -SD       Row Name 12/30/23 1609          Plan of Care Review    Plan of Care Reviewed With patient  -SD     Progress improving  -SD     Outcome Evaluation Pt amb within room with cane with shuffling gait, narrow CHRIS, and slow speed, and participated in static and dynamic standing balance activities at sink. No overt LOB. O2 sat stable on 2LO2nc. Pt pleasant and cooperative, remains below baseline level of function for mobility.  -SD       Row  Name 12/30/23 1609          Vital Signs    Pre Systolic BP Rehab 170  -SD     Pre Treatment Diastolic BP 69  -SD     Pretreatment Heart Rate (beats/min) 69  -SD     Pre SpO2 (%) 96  -SD     O2 Delivery Pre Treatment nasal cannula  -SD     Post SpO2 (%) 96  -SD     O2 Delivery Post Treatment nasal cannula  -SD     Pre Patient Position Supine  -SD     Post Patient Position Sitting  -SD       Row Name 12/30/23 1609          Positioning and Restraints    Pre-Treatment Position in bed  -SD     Post Treatment Position chair  -SD     In Chair notified nsg;reclined;call light within reach;encouraged to call for assist;exit alarm on;waffle cushion  -SD               User Key  (r) = Recorded By, (t) = Taken By, (c) = Cosigned By      Initials Name Provider Type    Valerie Arellano PT Physical Therapist                   Outcome Measures       Row Name 12/30/23 1611          How much help from another person do you currently need...    Turning from your back to your side while in flat bed without using bedrails? 4  -SD     Moving from lying on back to sitting on the side of a flat bed without bedrails? 4  -SD     Moving to and from a bed to a chair (including a wheelchair)? 4  -SD     Standing up from a chair using your arms (e.g., wheelchair, bedside chair)? 4  -SD     Climbing 3-5 steps with a railing? 3  -SD     To walk in hospital room? 3  -SD     AM-PAC 6 Clicks Score (PT) 22  -SD     Highest Level of Mobility Goal 7 --> Walk 25 feet or more  -SD       Row Name 12/30/23 1611          Functional Assessment    Outcome Measure Options AM-PAC 6 Clicks Basic Mobility (PT)  -SD               User Key  (r) = Recorded By, (t) = Taken By, (c) = Cosigned By      Initials Name Provider Type    Valerie Arellano PT Physical Therapist                                 Physical Therapy Education       Title: PT OT SLP Therapies (In Progress)       Topic: Physical Therapy (Done)       Point: Mobility training (Done)        Learning Progress Summary             Patient Eager, E, VU,DU by SD at 12/30/2023 1611    Acceptance, E, VU by ML at 12/27/2023 1055                         Point: Home exercise program (Done)       Learning Progress Summary             Patient Eager, E, VU,DU by SD at 12/30/2023 1611                         Point: Body mechanics (Done)       Learning Progress Summary             Patient Eager, E, VU,DU by SD at 12/30/2023 1611                         Point: Precautions (Done)       Learning Progress Summary             Patient Eager, E, VU,DU by SD at 12/30/2023 1611    Acceptance, E, VU by ML at 12/27/2023 1055                                         User Key       Initials Effective Dates Name Provider Type Discipline    SD 03/13/23 -  Valerie Esparza, ZULEIMA Physical Therapist PT     04/22/21 -  Dari Fischer Physical Therapist PT                  PT Recommendation and Plan     Plan of Care Reviewed With: patient  Progress: improving  Outcome Evaluation: Pt amb within room with cane with shuffling gait, narrow CHRIS, and slow speed, and participated in static and dynamic standing balance activities at sink. No overt LOB. O2 sat stable on 2LO2nc. Pt pleasant and cooperative, remains below baseline level of function for mobility.     Time Calculation:         PT Charges       Row Name 12/30/23 1611             Time Calculation    Start Time 1515  -SD      PT Received On 12/30/23  -SD      PT Goal Re-Cert Due Date 01/06/24  -SD         Time Calculation- PT    Total Timed Code Minutes- PT 39 minute(s)  -SD         Timed Charges    10597 - Gait Training Minutes  15  -SD      92084 - PT Therapeutic Activity Minutes 24  -SD         Total Minutes    Timed Charges Total Minutes 39  -SD       Total Minutes 39  -SD                User Key  (r) = Recorded By, (t) = Taken By, (c) = Cosigned By      Initials Name Provider Type    SD Valerie Esparza, PT Physical Therapist                  Therapy Charges for Today        Code Description Service Date Service Provider Modifiers Qty    57941241643 HC GAIT TRAINING EA 15 MIN 12/30/2023 Valerie Esparza, PT GP 1    58530146172 HC PT THERAPEUTIC ACT EA 15 MIN 12/30/2023 Valerie Esparza, PT GP 2            PT G-Codes  Outcome Measure Options: AM-PAC 6 Clicks Basic Mobility (PT)  AM-PAC 6 Clicks Score (PT): 22  AM-PAC 6 Clicks Score (OT): 21  PT Discharge Summary  Anticipated Discharge Disposition (PT): home with assist, home with home health    Valerie Esparza, PT  12/30/2023

## 2023-12-31 ENCOUNTER — READMISSION MANAGEMENT (OUTPATIENT)
Dept: CALL CENTER | Facility: HOSPITAL | Age: 68
End: 2023-12-31
Payer: COMMERCIAL

## 2023-12-31 VITALS
RESPIRATION RATE: 18 BRPM | BODY MASS INDEX: 25.64 KG/M2 | DIASTOLIC BLOOD PRESSURE: 61 MMHG | SYSTOLIC BLOOD PRESSURE: 158 MMHG | HEART RATE: 51 BPM | OXYGEN SATURATION: 96 % | TEMPERATURE: 98.4 F | WEIGHT: 114 LBS | HEIGHT: 56 IN

## 2023-12-31 PROBLEM — D89.831 CYTOKINE RELEASE SYNDROME, GRADE 1: Status: ACTIVE | Noted: 2023-12-31

## 2023-12-31 LAB
ALBUMIN SERPL-MCNC: 2.9 G/DL (ref 3.5–5.2)
ALBUMIN/GLOB SERPL: 1 G/DL
ALP SERPL-CCNC: 89 U/L (ref 39–117)
ALT SERPL W P-5'-P-CCNC: 12 U/L (ref 1–33)
ANION GAP SERPL CALCULATED.3IONS-SCNC: 7 MMOL/L (ref 5–15)
AST SERPL-CCNC: 15 U/L (ref 1–32)
BASOPHILS # BLD AUTO: 0.02 10*3/MM3 (ref 0–0.2)
BASOPHILS NFR BLD AUTO: 0.3 % (ref 0–1.5)
BILIRUB SERPL-MCNC: <0.2 MG/DL (ref 0–1.2)
BUN SERPL-MCNC: 27 MG/DL (ref 8–23)
BUN/CREAT SERPL: 35.1 (ref 7–25)
CALCIUM SPEC-SCNC: 8.9 MG/DL (ref 8.6–10.5)
CHLORIDE SERPL-SCNC: 102 MMOL/L (ref 98–107)
CO2 SERPL-SCNC: 32 MMOL/L (ref 22–29)
CREAT SERPL-MCNC: 0.77 MG/DL (ref 0.57–1)
CRP SERPL-MCNC: 0.91 MG/DL (ref 0–0.5)
DEPRECATED RDW RBC AUTO: 46.1 FL (ref 37–54)
EGFRCR SERPLBLD CKD-EPI 2021: 84.1 ML/MIN/1.73
EOSINOPHIL # BLD AUTO: 0.09 10*3/MM3 (ref 0–0.4)
EOSINOPHIL NFR BLD AUTO: 1.5 % (ref 0.3–6.2)
ERYTHROCYTE [DISTWIDTH] IN BLOOD BY AUTOMATED COUNT: 14.6 % (ref 12.3–15.4)
GLOBULIN UR ELPH-MCNC: 3 GM/DL
GLUCOSE BLDC GLUCOMTR-MCNC: 112 MG/DL (ref 70–130)
GLUCOSE SERPL-MCNC: 79 MG/DL (ref 65–99)
HCT VFR BLD AUTO: 31.1 % (ref 34–46.6)
HGB BLD-MCNC: 9.5 G/DL (ref 12–15.9)
IMM GRANULOCYTES # BLD AUTO: 0.08 10*3/MM3 (ref 0–0.05)
IMM GRANULOCYTES NFR BLD AUTO: 1.3 % (ref 0–0.5)
LYMPHOCYTES # BLD AUTO: 2.64 10*3/MM3 (ref 0.7–3.1)
LYMPHOCYTES NFR BLD AUTO: 42.9 % (ref 19.6–45.3)
MCH RBC QN AUTO: 26.5 PG (ref 26.6–33)
MCHC RBC AUTO-ENTMCNC: 30.5 G/DL (ref 31.5–35.7)
MCV RBC AUTO: 86.6 FL (ref 79–97)
MONOCYTES # BLD AUTO: 0.47 10*3/MM3 (ref 0.1–0.9)
MONOCYTES NFR BLD AUTO: 7.6 % (ref 5–12)
NEUTROPHILS NFR BLD AUTO: 2.86 10*3/MM3 (ref 1.7–7)
NEUTROPHILS NFR BLD AUTO: 46.4 % (ref 42.7–76)
NRBC BLD AUTO-RTO: 0 /100 WBC (ref 0–0.2)
PLAT MORPH BLD: NORMAL
PLATELET # BLD AUTO: 291 10*3/MM3 (ref 140–450)
PMV BLD AUTO: 11.2 FL (ref 6–12)
POTASSIUM SERPL-SCNC: 4.8 MMOL/L (ref 3.5–5.2)
PROT SERPL-MCNC: 5.9 G/DL (ref 6–8.5)
QT INTERVAL: 414 MS
QTC INTERVAL: 406 MS
RBC # BLD AUTO: 3.59 10*6/MM3 (ref 3.77–5.28)
RBC MORPH BLD: NORMAL
SODIUM SERPL-SCNC: 141 MMOL/L (ref 136–145)
WBC MORPH BLD: NORMAL
WBC NRBC COR # BLD AUTO: 6.16 10*3/MM3 (ref 3.4–10.8)

## 2023-12-31 PROCEDURE — 85007 BL SMEAR W/DIFF WBC COUNT: CPT | Performed by: INTERNAL MEDICINE

## 2023-12-31 PROCEDURE — 99238 HOSP IP/OBS DSCHRG MGMT 30/<: CPT | Performed by: INTERNAL MEDICINE

## 2023-12-31 PROCEDURE — 80053 COMPREHEN METABOLIC PANEL: CPT | Performed by: STUDENT IN AN ORGANIZED HEALTH CARE EDUCATION/TRAINING PROGRAM

## 2023-12-31 PROCEDURE — 85025 COMPLETE CBC W/AUTO DIFF WBC: CPT | Performed by: INTERNAL MEDICINE

## 2023-12-31 PROCEDURE — 63710000001 DEXAMETHASONE PER 0.25 MG: Performed by: STUDENT IN AN ORGANIZED HEALTH CARE EDUCATION/TRAINING PROGRAM

## 2023-12-31 PROCEDURE — 82948 REAGENT STRIP/BLOOD GLUCOSE: CPT

## 2023-12-31 PROCEDURE — 86140 C-REACTIVE PROTEIN: CPT | Performed by: INTERNAL MEDICINE

## 2023-12-31 RX ORDER — PANTOPRAZOLE SODIUM 40 MG/1
40 TABLET, DELAYED RELEASE ORAL
Qty: 30 TABLET | Refills: 0 | Status: SHIPPED | OUTPATIENT
Start: 2024-01-01

## 2023-12-31 RX ORDER — DEXAMETHASONE 6 MG/1
6 TABLET ORAL DAILY
Qty: 5 TABLET | Refills: 0 | Status: SHIPPED | OUTPATIENT
Start: 2024-01-01 | End: 2024-01-06

## 2023-12-31 RX ADMIN — GABAPENTIN 300 MG: 300 CAPSULE ORAL at 10:25

## 2023-12-31 RX ADMIN — Medication 10 ML: at 10:27

## 2023-12-31 RX ADMIN — ASPIRIN 81 MG: 81 TABLET, CHEWABLE ORAL at 10:24

## 2023-12-31 RX ADMIN — CYANOCOBALAMIN TAB 1000 MCG 500 MCG: 1000 TAB at 10:25

## 2023-12-31 RX ADMIN — Medication 1 TABLET: at 10:24

## 2023-12-31 RX ADMIN — HYDRALAZINE HYDROCHLORIDE 10 MG: 10 TABLET ORAL at 10:25

## 2023-12-31 RX ADMIN — APIXABAN 2.5 MG: 2.5 TABLET, FILM COATED ORAL at 10:25

## 2023-12-31 RX ADMIN — DOXYCYCLINE 100 MG: 100 CAPSULE ORAL at 10:25

## 2023-12-31 RX ADMIN — FAMOTIDINE 20 MG: 20 TABLET, FILM COATED ORAL at 10:25

## 2023-12-31 RX ADMIN — PANTOPRAZOLE SODIUM 40 MG: 40 TABLET, DELAYED RELEASE ORAL at 06:06

## 2023-12-31 RX ADMIN — SERTRALINE HYDROCHLORIDE 200 MG: 100 TABLET ORAL at 10:27

## 2023-12-31 RX ADMIN — DEXAMETHASONE 6 MG: 4 TABLET ORAL at 10:24

## 2023-12-31 RX ADMIN — BUSPIRONE HYDROCHLORIDE 7.5 MG: 15 TABLET ORAL at 10:24

## 2023-12-31 NOTE — OUTREACH NOTE
Prep Survey      Flowsheet Row Responses   [facility] patient discharged from? [place]   Is LACE score < 7 ? No   Eligibility [facility]   Date of Admission 12/26/23   Date of Discharge 12/31/23   Discharge Disposition Home-Health Care Sv   Discharge diagnosis COVID   Does the patient have one of the following disease processes/diagnoses(primary or secondary)? Other   Does the patient have Home health ordered? Yes   What is the Home health agency?  [facility]   Is there a DME ordered? No   Prep survey completed? Yes            Perla GUTIERREZ - Registered Nurse

## 2024-01-02 ENCOUNTER — HOME CARE VISIT (OUTPATIENT)
Dept: HOME HEALTH SERVICES | Facility: HOME HEALTHCARE | Age: 69
End: 2024-01-02
Payer: COMMERCIAL

## 2024-01-02 ENCOUNTER — TRANSITIONAL CARE MANAGEMENT TELEPHONE ENCOUNTER (OUTPATIENT)
Dept: CALL CENTER | Facility: HOSPITAL | Age: 69
End: 2024-01-02
Payer: COMMERCIAL

## 2024-01-02 VITALS
OXYGEN SATURATION: 95 % | RESPIRATION RATE: 18 BRPM | TEMPERATURE: 96.6 F | DIASTOLIC BLOOD PRESSURE: 62 MMHG | HEART RATE: 62 BPM | SYSTOLIC BLOOD PRESSURE: 94 MMHG

## 2024-01-02 PROCEDURE — G0151 HHCP-SERV OF PT,EA 15 MIN: HCPCS
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
## 2024-01-02 NOTE — OUTREACH NOTE
Call Center TCM Note      Flowsheet Row Responses   [facility] patient discharged from? [place]   Does the patient have one of the following disease processes/diagnoses(primary or secondary)? Other   TCM attempt successful? Yes   Call start time 1649   Call end time 1650   Discharge diagnosis COVID   Person spoke with today (if not patient) and relationship patient and    Meds reviewed with patient/caregiver? Yes   Is the patient having any side effects they believe may be caused by any medication additions or changes? No   Does the patient have all medications ordered at discharge? Yes   Is the patient taking all medications as directed (includes completed medication regime)? Yes   Comments Patient has a hospital followup scheduked with Delilah Becker on 1/5/24   Does the patient have an appointment with their PCP within 7-14 days of discharge? Yes   What is the Home health agency?   [facility]   Has home health visited the patient within 72 hours of discharge? Yes   Psychosocial issues? No   Did the patient receive a copy of their discharge instructions? Yes   Nursing interventions Reviewed instructions with patient   What is the patient's perception of their health status since discharge? Improving   Is the patient/caregiver able to teach back signs and symptoms related to disease process for when to call PCP? Yes   Is the patient/caregiver able to teach back signs and symptoms related to disease process for when to call 911? Yes   Is the patient/caregiver able to teach back the hierarchy of who to call/visit for symptoms/problems? PCP, Specialist, Home health nurse, Urgent Care, ED, 911 Yes   If the patient is a current smoker, are they able to teach back resources for cessation? Not a smoker   TCM call completed? Yes   Wrap up additional comments No needs at this time.   Call end time 1650   Would this patient benefit from a Referral to [facility] Social Work? No   Is the patient interested in  additional calls from an ambulatory ? No            Ernst Bentley RN    1/2/2024, 16:50 EST

## 2024-01-02 NOTE — Clinical Note
Resumption of Care Note:     Reason for hospitalization/new problems: COVID19    [facility] Findings:  Patient returns home to single-story home with spouse and adult daughter; patient currently wearing supplemental O2 at 2L/min via NC at night; ambulating in home with SPC; dyspnea on exertion    New/changed medications: dexamethasone 6mg tab once daily; nystatin 100,000 ml/unit swish and spit 5ml 4 times daily for 15 total doses; protonix 40mg EC one table daily in the morning; actos 30 mg one tab daily    New/changed orders: NA    Educated on Emergency Plan, steps to take prior to going to the ER and when to Call Home Health First:  Yes    Plan/Focus of Care and Skilled need: Skilled PT to address COVID19 and deficits in strength, balance, functional mobility, and activity tolerance    Plan for next visit: Progress BLE strengthening, balance, ambulation, and activity tolerance

## 2024-01-05 ENCOUNTER — OFFICE VISIT (OUTPATIENT)
Dept: INTERNAL MEDICINE | Facility: CLINIC | Age: 69
End: 2024-01-05
Payer: COMMERCIAL

## 2024-01-05 VITALS
WEIGHT: 112 LBS | BODY MASS INDEX: 25.19 KG/M2 | SYSTOLIC BLOOD PRESSURE: 150 MMHG | DIASTOLIC BLOOD PRESSURE: 80 MMHG | HEIGHT: 56 IN | HEART RATE: 60 BPM | TEMPERATURE: 97.8 F

## 2024-01-05 DIAGNOSIS — U07.1 COVID-19 VIRUS INFECTION: ICD-10-CM

## 2024-01-05 DIAGNOSIS — R53.1 WEAKNESS: ICD-10-CM

## 2024-01-05 DIAGNOSIS — J44.9 CHRONIC OBSTRUCTIVE PULMONARY DISEASE, UNSPECIFIED COPD TYPE: ICD-10-CM

## 2024-01-05 DIAGNOSIS — Z92.89 HOSPITALIZATION WITHIN LAST 30 DAYS: Primary | ICD-10-CM

## 2024-01-05 DIAGNOSIS — E11.9 TYPE 2 DIABETES MELLITUS WITHOUT COMPLICATION, WITHOUT LONG-TERM CURRENT USE OF INSULIN: ICD-10-CM

## 2024-01-05 RX ORDER — PIOGLITAZONEHYDROCHLORIDE 30 MG/1
30 TABLET ORAL DAILY
Qty: 90 TABLET | Refills: 1 | Status: SHIPPED | OUTPATIENT
Start: 2024-01-05

## 2024-01-05 NOTE — PROGRESS NOTES
Transitional Care Follow Up Note      Name: Sheree Samuel    : 1955     MRN: [id number]   Care Team: Patient Care Team:  Delilah Becker APRN as PCP - General (Family Medicine)  Macho Calabrese MD as Cardiologist (Cardiology)    Transitional Care Follow Up Visit    Chief Complaint  Transitional Care Management    Subjective     History of Present Illness:    Sheree Samuel is a 68 y.o. female who presents for a transitional care management visit.    Within 48 business hours after discharge our office contacted her via telephone to coordinate her care and needs.      I reviewed and discussed the details of that call along with the discharge summary, hospital problems, inpatient lab results, inpatient diagnostic studies, and consultation reports with Sheree.     Current outpatient and discharge medications have been reconciled for the patient.  Reviewed by: GUILLERMO Zamudio          2023     1:27 PM   Date of TCM Phone Call   [facility]   Date of Admission 2023   Date of Discharge 2023   Discharge Disposition [place] Care [facility] [place]     Risk for Readmission (LACE) Score: 14 (2023  6:00 AM)      History of Present Illness  The patient is a 68-year-old female who is here for a transitional care appointment after admission to the hospital for COVID-19 and pneumonia. She is accompanied by her .    She came here and saw Dr. Jackson on 2023. She was sick for 3 days with cough and congestion. She went to the hospital and was there for 6 days. She was discharged on 2023. She has been sleeping frequently since she has been home. She had double pneumonia before. She is going to resume physical therapy. She is eating and drinking okay. Her water consumption is not as good as it should be. She was not given an incentive spirometer when she was in the hospital. She has not seen pulmonology yet. She has access to oxygen at home. She was using 2  "liters of oxygen as needed when she was in the hospital.  She has not utilized oxygen at home in the last week.    Supplemental Information  She is checking her sugars at home and they have been running about 125. She needs a refill on her pioglitazone. She saw ophthalmology.     The following portions of the patient's history were reviewed and updated as appropriate: allergies, current medications, past family history, past medical history, past social history, past surgical history, and problem list.      Procedures    PHQ-9 Total Score:      Objective     Vital Signs  /80   Pulse 60   Temp 97.8 °F (36.6 °C) (Temporal)   Ht 142.2 cm (55.98\")   Wt 50.8 kg (112 lb)   BMI 25.12 kg/m²   Estimated body mass index is 25.12 kg/m² as calculated from the following:    Height as of this encounter: 142.2 cm (55.98\").    Weight as of this encounter: 50.8 kg (112 lb).            Physical Exam  Vitals and nursing note reviewed.   HENT:      Head: Normocephalic.   Cardiovascular:      Rate and Rhythm: Normal rate.   Pulmonary:      Effort: Pulmonary effort is normal.      Breath sounds: Normal breath sounds.   Skin:     General: Skin is warm and dry.   Neurological:      General: No focal deficit present.      Mental Status: She is alert and oriented to person, place, and time.   Psychiatric:         Mood and Affect: Mood normal.         Behavior: Behavior normal.         Thought Content: Thought content normal.         Judgment: Judgment normal.            Assessment and Plan     Assessment/Plan:  Diagnoses and all orders for this visit:    1. Hospitalization within last 30 days (Primary)  -     Ambulatory Referral to Pulmonology  She was informed that it will take a couple of weeks to get over the fatigue from COVID-19 plus being in the hospital and being deconditioned. She was recommended to use the incentive spirometer several times a day. She was advised to get up and move around. She was advised to test her " oxygen at home. She was advised to wait about a month or 6 weeks to get the COVID-19 vaccine.    2. COVID-19 virus infection  -Stable, resolved.    3. Weakness  -Resume PT and OT.    4. Chronic obstructive pulmonary disease, unspecified COPD type  -     Ambulatory Referral to Pulmonology  -     Complete PFT - Pre & Post Bronchodilator; Future    5. Type 2 diabetes mellitus without complication, without long-term current use of insulin  -     pioglitazone (ACTOS) 30 MG tablet; Take 1 tablet by mouth Daily.  Dispense: 90 tablet; Refill: 1  -Continue to monitor blood sugars daily and report any abnormal values.    Follow-up  The patient will follow up in late 03/2024.  There are no Patient Instructions on file for this visit.  Plan of care reviewed with patient at the conclusion of today's visit. Education was provided regarding diagnosis, management and any prescribed or recommended OTC medications.  Patient verbalizes understanding of and agreement with management plan.    Follow Up  Return for Next Scheduled Follow up.    GUILLERMO Zamudio     Transcribed from ambient dictation for GUILLERMO Zamudio by Izabella Lee.  01/05/24   09:47 EST    Patient or patient representative verbalized consent to the visit recording.  I have personally performed the services described in this document as transcribed by the above individual, and it is both accurate and complete.

## 2024-01-08 ENCOUNTER — HOME CARE VISIT (OUTPATIENT)
Dept: HOME HEALTH SERVICES | Facility: HOME HEALTHCARE | Age: 69
End: 2024-01-08
Payer: COMMERCIAL

## 2024-01-08 VITALS
OXYGEN SATURATION: 94 % | SYSTOLIC BLOOD PRESSURE: 120 MMHG | TEMPERATURE: 96.8 F | DIASTOLIC BLOOD PRESSURE: 54 MMHG | HEART RATE: 63 BPM | RESPIRATION RATE: 18 BRPM

## 2024-01-08 PROCEDURE — G0151 HHCP-SERV OF PT,EA 15 MIN: HCPCS

## 2024-01-11 ENCOUNTER — HOME CARE VISIT (OUTPATIENT)
Dept: HOME HEALTH SERVICES | Facility: HOME HEALTHCARE | Age: 69
End: 2024-01-11
Payer: COMMERCIAL

## 2024-01-11 VITALS
DIASTOLIC BLOOD PRESSURE: 70 MMHG | SYSTOLIC BLOOD PRESSURE: 128 MMHG | RESPIRATION RATE: 16 BRPM | OXYGEN SATURATION: 98 % | HEART RATE: 68 BPM | TEMPERATURE: 98.3 F

## 2024-01-11 VITALS
HEART RATE: 64 BPM | SYSTOLIC BLOOD PRESSURE: 112 MMHG | TEMPERATURE: 96.4 F | OXYGEN SATURATION: 97 % | RESPIRATION RATE: 18 BRPM | DIASTOLIC BLOOD PRESSURE: 50 MMHG

## 2024-01-11 PROCEDURE — G0153 HHCP-SVS OF S/L PATH,EA 15MN: HCPCS

## 2024-01-11 PROCEDURE — G0151 HHCP-SERV OF PT,EA 15 MIN: HCPCS

## 2024-01-12 NOTE — HOME HEALTH
"ST Eval Note:     Home Health ordered for: ST services secondary to decreased cognition, decreased safety awareness/judgement.     Reason for Hosp/Primary Dx/Co-morbidities: Pneumonia Other PMH is significant for hypoxia, COPD, CAD, hyperlipidemia, HTN, DMII  .   Focus of Care: Cognition    Patient's goal(s): \"to be as independent as possible.\"    Current Functional status/mobility/DME: Pt. ambulates with FWW    HB status/Living Arrangements: Pt. is homebound secondary to high risk of falls, decreased endurance, inability to drive, environmental barriers.    Fall Risk/Safety concerns: Pt. with significant fall risk with history of multiple falls.     Plan for next visit: Education and training on fall prevention, problem solving and  safety awareness"

## 2024-01-15 ENCOUNTER — HOME CARE VISIT (OUTPATIENT)
Dept: HOME HEALTH SERVICES | Facility: HOME HEALTHCARE | Age: 69
End: 2024-01-15
Payer: COMMERCIAL

## 2024-01-15 VITALS
HEART RATE: 88 BPM | TEMPERATURE: 96.8 F | RESPIRATION RATE: 18 BRPM | OXYGEN SATURATION: 94 % | DIASTOLIC BLOOD PRESSURE: 74 MMHG | SYSTOLIC BLOOD PRESSURE: 146 MMHG

## 2024-01-15 PROCEDURE — G0151 HHCP-SERV OF PT,EA 15 MIN: HCPCS

## 2024-01-17 ENCOUNTER — HOME CARE VISIT (OUTPATIENT)
Dept: HOME HEALTH SERVICES | Facility: HOME HEALTHCARE | Age: 69
End: 2024-01-17
Payer: COMMERCIAL

## 2024-01-17 VITALS
DIASTOLIC BLOOD PRESSURE: 72 MMHG | TEMPERATURE: 97.9 F | SYSTOLIC BLOOD PRESSURE: 118 MMHG | RESPIRATION RATE: 16 BRPM | OXYGEN SATURATION: 97 % | HEART RATE: 65 BPM

## 2024-01-17 VITALS
SYSTOLIC BLOOD PRESSURE: 124 MMHG | TEMPERATURE: 98.5 F | OXYGEN SATURATION: 96 % | DIASTOLIC BLOOD PRESSURE: 82 MMHG | HEART RATE: 81 BPM | RESPIRATION RATE: 16 BRPM

## 2024-01-17 PROCEDURE — G0299 HHS/HOSPICE OF RN EA 15 MIN: HCPCS

## 2024-01-17 PROCEDURE — G0153 HHCP-SVS OF S/L PATH,EA 15MN: HCPCS

## 2024-01-17 NOTE — HOME HEALTH
SN Eval Note    Skill/education provided: wound care LLE, medication education, pain management    Patient/caregiver response: tolerated well; independent with wound care with supplies in the home    Plan for next visit: wound assessment LLE    Other pertinent info: Patient is questioning if she should be taking Actos with Metformin.  Also questioning if she needs to take protonix.  SN to call PCP's office to inquire about medication educations.

## 2024-01-18 ENCOUNTER — HOME CARE VISIT (OUTPATIENT)
Dept: HOME HEALTH SERVICES | Facility: HOME HEALTHCARE | Age: 69
End: 2024-01-18
Payer: COMMERCIAL

## 2024-01-18 VITALS
HEART RATE: 60 BPM | OXYGEN SATURATION: 92 % | TEMPERATURE: 96.4 F | RESPIRATION RATE: 18 BRPM | DIASTOLIC BLOOD PRESSURE: 74 MMHG | SYSTOLIC BLOOD PRESSURE: 144 MMHG

## 2024-01-18 PROCEDURE — G0151 HHCP-SERV OF PT,EA 15 MIN: HCPCS

## 2024-01-18 NOTE — HOME HEALTH
Therapy interventions focused on improving pt's memory recall through implementation of external aides on her phone and in the home.  Therapy also focused on safety awareness, insight and judgement in order to decrease risk of falls.  SLP educated pt. on using her smartphone to log appointments and her Amada device to set up medication reminders.  Therapy also focused on improving safety awareness, judgement and insight.  SLP discussed with patient learning to ask for help, tasks she can do versus should do in terms of safety.  Pt. has had numerous falls and continues to be at high risk of falls and is non-compliant with recommendations.  Next session to focus on using external aides for memory recall.

## 2024-01-22 ENCOUNTER — HOME CARE VISIT (OUTPATIENT)
Dept: HOME HEALTH SERVICES | Facility: HOME HEALTHCARE | Age: 69
End: 2024-01-22
Payer: COMMERCIAL

## 2024-01-22 VITALS
RESPIRATION RATE: 18 BRPM | SYSTOLIC BLOOD PRESSURE: 118 MMHG | OXYGEN SATURATION: 98 % | HEART RATE: 64 BPM | TEMPERATURE: 96.9 F | DIASTOLIC BLOOD PRESSURE: 56 MMHG

## 2024-01-22 PROCEDURE — G0151 HHCP-SERV OF PT,EA 15 MIN: HCPCS

## 2024-01-23 ENCOUNTER — HOSPITAL ENCOUNTER (OUTPATIENT)
Dept: PULMONOLOGY | Facility: HOSPITAL | Age: 69
Discharge: HOME | End: 2024-01-23
Payer: COMMERCIAL

## 2024-01-23 DIAGNOSIS — J44.9 CHRONIC OBSTRUCTIVE PULMONARY DISEASE, UNSPECIFIED COPD TYPE: ICD-10-CM

## 2024-01-23 PROCEDURE — 94010 BREATHING CAPACITY TEST: CPT

## 2024-01-23 PROCEDURE — 94726 PLETHYSMOGRAPHY LUNG VOLUMES: CPT

## 2024-01-23 PROCEDURE — 94729 DIFFUSING CAPACITY: CPT

## 2024-01-24 ENCOUNTER — HOME CARE VISIT (OUTPATIENT)
Dept: HOME HEALTH SERVICES | Facility: HOME HEALTHCARE | Age: 69
End: 2024-01-24
Payer: COMMERCIAL

## 2024-01-24 PROCEDURE — G0153 HHCP-SVS OF S/L PATH,EA 15MN: HCPCS

## 2024-01-25 ENCOUNTER — HOME CARE VISIT (OUTPATIENT)
Dept: HOME HEALTH SERVICES | Facility: HOME HEALTHCARE | Age: 69
End: 2024-01-25
Payer: COMMERCIAL

## 2024-01-25 VITALS
HEART RATE: 60 BPM | DIASTOLIC BLOOD PRESSURE: 62 MMHG | SYSTOLIC BLOOD PRESSURE: 130 MMHG | OXYGEN SATURATION: 95 % | TEMPERATURE: 98.5 F | RESPIRATION RATE: 16 BRPM

## 2024-01-25 VITALS
HEART RATE: 93 BPM | TEMPERATURE: 96.8 F | DIASTOLIC BLOOD PRESSURE: 54 MMHG | RESPIRATION RATE: 18 BRPM | OXYGEN SATURATION: 94 % | SYSTOLIC BLOOD PRESSURE: 98 MMHG

## 2024-01-25 VITALS
TEMPERATURE: 97 F | HEART RATE: 68 BPM | DIASTOLIC BLOOD PRESSURE: 60 MMHG | RESPIRATION RATE: 18 BRPM | OXYGEN SATURATION: 93 % | SYSTOLIC BLOOD PRESSURE: 120 MMHG

## 2024-01-25 PROCEDURE — G0151 HHCP-SERV OF PT,EA 15 MIN: HCPCS

## 2024-01-25 PROCEDURE — G0300 HHS/HOSPICE OF LPN EA 15 MIN: HCPCS

## 2024-01-25 NOTE — HOME HEALTH
Routine Visit Note: LPN    Skill/education provided:   medication education, pain managementc, wound assessment. wound has healed pt using jergans lotion for moisturing    Patient/caregiver response: tolerated well; independent with wound care with supplies in the home.

## 2024-01-25 NOTE — HOME HEALTH
Therapy interventions focused on improving cognitive skills in order to increase her ability to perform daily living tasks with greater independence and safety and reduce pt's risk of falls. SLP educated pt. on external aides and strategies to use to improve recall of recent events including MD appointments, medication administration and recommendations from PT regarding use of her AD.  Therapy also focused on improving safety awareness and generating solutions to possible problems she may encounter in the home to improve fall prevention.  Next session to focus on additional use of external aides to improve memory recall.

## 2024-01-26 NOTE — TELEPHONE ENCOUNTER
.    Caller: Jimmie Sheree TIAGO    Relationship: Self    Best call back number: [phone]     Requested Prescriptions:   Requested Prescriptions     Pending Prescriptions Disp Refills    apixaban (ELIQUIS) 2.5 MG tablet tablet 60 tablet 0     Sig: Take 1 tablet by mouth Every 12 (Twelve) Hours. Indications: history of DVT/PE    pantoprazole (PROTONIX) 40 MG EC tablet 30 tablet 0     Sig: Take 1 tablet by mouth Every Morning.        Pharmacy where request should be sent: [facility] [phone] [place], [place] [phone] FX     Last office visit with prescribing clinician: 1/5/2024   Last telemedicine visit with prescribing clinician: Visit date not found   Next office visit with prescribing clinician: 1/29/2024     Additional details provided by patient:     Does the patient have less than a 3 day supply:  [x] Yes  [] No    Would you like a call back once the refill request has been completed: [] Yes [x] No    If the office needs to give you a call back, can they leave a voicemail: [] Yes [x] No    Rosey Wyman Rep   01/26/24 12:39 EST

## 2024-01-29 ENCOUNTER — HOME CARE VISIT (OUTPATIENT)
Dept: HOME HEALTH SERVICES | Facility: HOME HEALTHCARE | Age: 69
End: 2024-01-29
Payer: COMMERCIAL

## 2024-01-29 ENCOUNTER — OFFICE VISIT (OUTPATIENT)
Dept: INTERNAL MEDICINE | Facility: CLINIC | Age: 69
End: 2024-01-29
Payer: COMMERCIAL

## 2024-01-29 VITALS
SYSTOLIC BLOOD PRESSURE: 130 MMHG | HEART RATE: 78 BPM | OXYGEN SATURATION: 96 % | RESPIRATION RATE: 18 BRPM | DIASTOLIC BLOOD PRESSURE: 54 MMHG | TEMPERATURE: 97.1 F

## 2024-01-29 VITALS
DIASTOLIC BLOOD PRESSURE: 56 MMHG | HEART RATE: 72 BPM | BODY MASS INDEX: 25.87 KG/M2 | SYSTOLIC BLOOD PRESSURE: 104 MMHG | HEIGHT: 56 IN | TEMPERATURE: 97.8 F | WEIGHT: 115 LBS

## 2024-01-29 DIAGNOSIS — G89.29 CHRONIC PAIN OF LEFT KNEE: ICD-10-CM

## 2024-01-29 DIAGNOSIS — I10 ESSENTIAL (PRIMARY) HYPERTENSION: ICD-10-CM

## 2024-01-29 DIAGNOSIS — M25.562 CHRONIC PAIN OF LEFT KNEE: ICD-10-CM

## 2024-01-29 DIAGNOSIS — F32.A DEPRESSIVE DISORDER: ICD-10-CM

## 2024-01-29 DIAGNOSIS — R63.4 WEIGHT LOSS: Primary | ICD-10-CM

## 2024-01-29 DIAGNOSIS — I25.119 CORONARY ARTERY DISEASE INVOLVING NATIVE CORONARY ARTERY OF NATIVE HEART WITH ANGINA PECTORIS: ICD-10-CM

## 2024-01-29 PROCEDURE — G0151 HHCP-SERV OF PT,EA 15 MIN: HCPCS

## 2024-01-29 PROCEDURE — 99214 OFFICE O/P EST MOD 30 MIN: CPT

## 2024-01-29 NOTE — PROGRESS NOTES
"[facility]  [facility]    Chief Complaint  Coronary Artery Disease    Subjective    History of Present Illness {CC  Problem List  Visit  Diagnosis   Encounters  Notes  Medications  Labs  Result Review Imaging  Media :23}     Sehree Samuel is a 68 y.o. female with CAD status post remote CABG, diabetes, hyperlipidemia, chronic pulmonary embolism, hypertension, fibromyalgia, subdural hematoma, seizure disorder, sleep apnea who presents today for CAD.    She believes that she had her CABG around 2013 and had a stent or 2 after. Recently followed by Dr. Britton in [place] (?[place] or [place]). Last heart catheterization was with Dr. Farnsworth in 2018 which showed mild CAD and 4/4 patent bypass grafts. Denies any recent chest pain, she notes chronic SANDHU. Echo 10/2023 showed no significant abnormalities.     Hx DVT/PE. Has chronic intermittent lower extreme edema, currently stable.  She reports history of cellulitis in the past.  Currently on Eliquis (low dose due to SAH)        Objective     Vital Signs:   Vitals:    01/30/24 1357 01/30/24 1358 01/30/24 1359   BP: 140/65 142/63 131/62   BP Location: Right arm Left arm Left arm   Patient Position: Sitting Standing Sitting   Cuff Size: Adult Adult Adult   Pulse: 71 82 70   Resp:   18   Temp: 97.3 °F (36.3 °C) 97.3 °F (36.3 °C) 97.3 °F (36.3 °C)   TempSrc:  Temporal Temporal   SpO2: 96% 96% 94%   Weight:   51.3 kg (113 lb)   Height:   142.2 cm (56\")     Body mass index is 25.33 kg/m².  Physical Exam  Vitals reviewed.   Constitutional:       Appearance: Normal appearance.   HENT:      Head: Normocephalic.   Neck:      Vascular: No carotid bruit.   Cardiovascular:      Rate and Rhythm: Normal rate and regular rhythm.      Pulses: Normal pulses.      Heart sounds: S1 normal and S2 normal. Murmur heard.      Systolic murmur is present with a grade of 2/6.      Comments: Trace edema with brawny discoloration suggestive of PVD  Pulmonary:      " Effort: Pulmonary effort is normal. No respiratory distress.      Breath sounds: Normal breath sounds.   Chest:      Chest wall: No tenderness.   Abdominal:      General: Abdomen is flat.      Palpations: Abdomen is soft.   Musculoskeletal:      Cervical back: Neck supple.      Right lower leg: Edema present.      Left lower leg: Edema present.   Skin:     General: Skin is warm and dry.   Neurological:      General: No focal deficit present.      Mental Status: She is alert and oriented to person, place, and time. Mental status is at baseline.   Psychiatric:         Mood and Affect: Mood normal.         Behavior: Behavior normal.         Thought Content: Thought content normal.              Result Review  Data Reviewed:{ Labs  Result Review  Imaging  Med Tab  Media :23}   Adult Transthoracic Echo Complete W/ Cont if Necessary Per Protocol (10/26/2023 11:55)  Duplex Venous Lower Extremity - Bilateral CAR (10/26/2023 11:51)  ECG 12 Lead ED Triage Standing Order; Weak / Dizzy / AMS (12/26/2023 18:57)  Cardiac Catheterization/Vascular Study (07/12/2018 14:13)     POC Glucose Once (12/31/2023 07:38)  CBC & Differential (12/31/2023 05:13)  C-reactive Protein (12/31/2023 05:13)  Comprehensive Metabolic Panel (12/31/2023 05:13)  Lipid Panel (12/05/2023 10:10)      Assessment and Plan {CC Problem List  Visit Diagnosis  ROS  Review (Popup)  Health Maintenance  Quality  BestPractice  Medications  SmartSets  SnapShot Encounters  Media :23}   1. Coronary artery disease involving coronary bypass graft of native heart without angina pectoris  -Stable symptoms and stable EKG.  -Continue Aspirin.  She has been out of her pravastatin.  Recommended higher intensity statin, sent her in Crestor 20 mg daily.  She is taking this and tolerated in the past  - ECG 12 Lead; Future  - Ambulatory Referral to Cardiology    2. Hyperlipidemia LDL goal <70  -Start rosuvastatin 20 mg daily, would repeat lipid panel at follow-up with  PCP or cardiology    3. Essential (primary) hypertension  - Appears to be reasonably controlled.  Continue amlodipine      Follow Up {Instructions Charge Capture  Follow-up Communications :23}   No follow-ups on file.    Patient was given instructions and counseling regarding her condition or for health maintenance advice. Please see specific information pulled into the AVS if appropriate.  Advised to call the [facility] with any questions, concerns, or worsening symptoms.

## 2024-01-29 NOTE — PROGRESS NOTES
Acute Office Visit      Name: Sheree Samuel    : 1955     MRN: [id number]   Care Team: Patient Care Team:  Delilah Becker APRN as PCP - General (Family Medicine)  Macho Calabrese MD as Cardiologist (Cardiology)    Chief Complaint  Weight Loss (/)    Subjective     History of Present Illness:  The patient is a 68-year-old female who presents for evaluation of multiple medical concerns. She is accompanied by Alessio, her .    She complains of anxiety. She has been taking buspirone 7.5 mg 3 times a day for a long time. She is also taking trazodone at night and sertraline. She has seen a therapist in the past, but reports that she does not have time to add therapy appointments to her already busy schedule.    She did not realize that she was losing weight. She is eating and has been trying to eat a healthy diet. She has occasionally been drinking Mountain Dew; however, she has been trying to decrease her intake.  Alessio reports that at home, he has never seen the patient's weight less than 112 pounds. Her highest weight was 118 pounds. Today, her weight is 114 pounds. Her weight has been stable over the past 1 month.     Her health has declined over the past several years. She is still working with therapy, and it is helping to get her strength back.     She had a fall because she did not have her walker. She came in the back door, got to the top of the ramp and inside the house, and she fell. She is having increased difficulty with ambulation and would benefit from a power wheelchair.    Her blood glucose levels are within normal limits at home. It is about 120 mg/dL before she eats in the morning. She does not check her blood glucose level every day.    She has not been taking metoprolol. She denies any cardiac arrhythmia. She had a quadruple bypass for occlusion and had 2 stents placed. She has not established care with a cardiologist. She has occasionally been checking her blood  pressure. She is still taking amlodipine.    She complains of excruciating pain in her knees. The weather is contributing to her knee pain. She has been checked for arthritis. She has had a right knee replacement. She is not taking any medication for her knees. She had cellulitis in bilateral lower extremities, and they have been wrapped.    Her vision has gotten worse.    She has not been taking famotidine or pantoprazole.      Review of Systems:    Past Medical History:   Diagnosis Date    Anxiety     Arthritis     ASHD (arteriosclerotic heart disease)     Cancer     Cervical disc disorder of mid-cervical region     Chronic pain     Depression     Depression     Diabetes mellitus     Heart disease     Hyperlipidemia     Hypertension     MRSA carrier     Myocardial infarction     Pulmonary embolism        Past Surgical History:   Procedure Laterality Date    BREAST EXCISIONAL BIOPSY Right     x 2    CARDIAC CATHETERIZATION N/A 8/22/2016    Procedure: Left Heart Cath with +/- CBI;  Surgeon: Bernard Palumbo MD;  Location:  [facility];  Service:     CARDIAC CATHETERIZATION Bilateral 7/12/2018    Procedure: Right and Left Heart Cath;  Surgeon: Seth Farnsworth MD;  Location:  [facility];  Service: Cardiovascular    CORONARY ARTERY BYPASS GRAFT      CORONARY ARTERY BYPASS GRAFT      HYSTERECTOMY      OOPHORECTOMY      PAIN PUMP INSERTION/REVISION      PAIN PUMP INSERTION/REVISION      REDUCTION MAMMAPLASTY      early 80's       Social History     Socioeconomic History    Marital status:    Tobacco Use    Smoking status: Never    Smokeless tobacco: Never   Vaping Use    Vaping Use: Never used   Substance and Sexual Activity    Alcohol use: No    Drug use: No    Sexual activity: Not Currently         Current Outpatient Medications:     acetaminophen (TYLENOL) 500 MG tablet, Take 1 tablet by mouth Every 6 (Six) Hours As Needed for Mild Pain., Disp: , Rfl:      albuterol (PROVENTIL HFA;VENTOLIN HFA) 108 (90 Base) MCG/ACT inhaler, Inhale 2 puffs Every 4 (Four) Hours As Needed for Wheezing. Indications: Chronic Obstructive Lung Disease, Disp: , Rfl:     amLODIPine (NORVASC) 5 MG tablet, Take 1 tablet by mouth Daily. Indications: High Blood Pressure Disorder, Disp: , Rfl:     apixaban (ELIQUIS) 2.5 MG tablet tablet, Take 1 tablet by mouth Every 12 (Twelve) Hours. Indications: history of DVT/PE, Disp: 60 tablet, Rfl: 0    aspirin 81 MG chewable tablet, Chew 1 tablet Daily. Indications: Disease involving Lipid Deposits in the Arteries, Disp: , Rfl:     Blood Glucose Monitoring Suppl (Accu-Chek Guide Me) w/Device kit, USE TO CHECK GLUCOSE AS DIRECTED ONCE DAILY, Disp: , Rfl:     busPIRone (BUSPAR) 7.5 MG tablet, Take 1 tablet by mouth 3 (Three) Times a Day. Indications: Anxiety Disorder, Major Depressive Disorder, Disp: 270 tablet, Rfl: 1    Cyanocobalamin (Vitamin B 12) 500 MCG tablet, Take 1 tablet by mouth Daily., Disp: , Rfl:     Fluticasone-Salmeterol (ADVAIR/WIXELA) 250-50 MCG/ACT DISKUS, Inhale 1 puff Daily As Needed. Indications: Chronic Obstructive Lung Disease, Disp: , Rfl:     gabapentin (NEURONTIN) 400 MG capsule, Take 1 capsule by mouth 3 (Three) Times a Day. Indications: Diabetes with Nerve Disease, Disp: 90 capsule, Rfl: 0    glucose blood test strip, Use to check blood sugars once daily, use strips covered by insurance., Disp: 100 each, Rfl: 12    Lancets misc, Use one daily to check blood sugars, use lancets covered by insurance., Disp: 100 each, Rfl: 1    lidocaine (LIDODERM) 5 %, Place 1 patch on the skin as directed by provider Daily As Needed. Remove & Discard patch within 12 hours or as directed by MD, Disp: , Rfl:     metFORMIN (GLUCOPHAGE) 1000 MG tablet, Take 1 tablet by mouth 2 (Two) Times a Day With Meals. Indications: Type 2 Diabetes, Disp: 180 tablet, Rfl: 1    Multiple Vitamins-Minerals (CENTRUM SILVER PO), Take 1 tablet by mouth Daily. Indications:  "nutritional supplement, Disp: , Rfl:     O2 (OXYGEN), Inhale 2 L/min Daily As Needed. Indications: oxygen support, Disp: , Rfl:     pain patient supplied pump, Continuous. Morphine. Patient dose not know the setting, dose, or how much is delivered a day. Can give a bolus every 6 hours. Next refill 11.19.23. Dr. Marshall at [facility] is managing.  Indications: pain, Disp: , Rfl:     pioglitazone (ACTOS) 30 MG tablet, Take 1 tablet by mouth Daily., Disp: 90 tablet, Rfl: 1    pramipexole (MIRAPEX) 0.125 MG tablet, Take 1 tablet by mouth 3 (Three) Times a Day. Indications: Restless Leg Syndrome, Disp: , Rfl:     pravastatin (Pravachol) 20 MG tablet, Take 1 tablet by mouth Daily. Indications: High Amount of Fats in the Blood, Disp: , Rfl:     sennosides-docusate (PERICOLACE) 8.6-50 MG per tablet, Take 1 tablet by mouth Daily As Needed. Indications: Constipation, Disp: , Rfl:     sertraline (ZOLOFT) 100 MG tablet, Take 2 tablets by mouth Daily. Indications: Generalized Anxiety Disorder, Major Depressive Disorder, Disp: 90 tablet, Rfl: 1    tiZANidine (ZANAFLEX) 4 MG tablet, Take 1 tablet by mouth 3 (Three) Times a Day As Needed. Indications: Muscle Spasticity, Musculoskeletal Pain, Disp: , Rfl:     traZODone (DESYREL) 100 MG tablet, Take 2 tablets by mouth Every Night. Indications: Trouble Sleeping, Disp: , Rfl:     Diclofenac Sodium (VOLTAREN) 1 % gel gel, Apply 2 g topically to the appropriate area as directed 2 (Two) Times a Day As Needed (knee pain)., Disp: 350 g, Rfl: 1    Procedures    PHQ-9 Total Score:      Objective     Vital Signs  /56 (BP Location: Left arm, Patient Position: Sitting, Cuff Size: Adult)   Pulse 72   Temp 97.8 °F (36.6 °C) (Temporal)   Ht 142.2 cm (55.98\")   Wt 52.2 kg (115 lb)   BMI 25.80 kg/m²   Estimated body mass index is 25.8 kg/m² as calculated from the following:    Height as of this encounter: 142.2 cm (55.98\").    Weight as of this encounter: 52.2 kg (115 lb).      "       Physical Exam  Vitals and nursing note reviewed.   HENT:      Head: Normocephalic.   Cardiovascular:      Rate and Rhythm: Normal rate.   Pulmonary:      Effort: Pulmonary effort is normal.      Breath sounds: Normal breath sounds.   Skin:     General: Skin is warm and dry.   Neurological:      General: No focal deficit present.      Mental Status: She is alert and oriented to person, place, and time.   Psychiatric:         Mood and Affect: Mood normal.         Behavior: Behavior normal.         Thought Content: Thought content normal.         Judgment: Judgment normal.            @[facility]@    Assessment and Plan     Assessment/Plan:  Diagnoses and all orders for this visit:    1. Weight loss (Primary)  -Suspect likely weight mistake or discrepancy on her home scale.  Her weight is stable over the last several months.  She has had approximately a 30 pound weight loss over the last 8 years.  -Both Ms. Samuel and her , Alessio states that she has a healthy appetite for food, boost, Mountain Dew.    2. Depressive disorder  -Continue with possible increase of buspirone 7.5 mg 3 times a day, trazodone, sertraline.  -Suspect likely worsening seasonal depression related to loss of independence, ability to participate in hobbies that bring her wilfredo, living with her daughter while her house is being built.    3. Essential (primary) hypertension  -     Ambulatory Referral to [facility]  -Continue to monitor BP and report any abnormal values.  -May continue to hold metoprolol until seen by [facility].    4. Coronary artery disease involving native coronary artery of native heart with angina pectoris  -     Ambulatory Referral to [facility]    5. Chronic pain of left knee  -     Diclofenac Sodium (VOLTAREN) 1 % gel gel; Apply 2 g topically to the appropriate area as directed 2 (Two) Times a Day As Needed (knee pain).  Dispense: 350 g;  Refill: 1  -Continue to work with physical therapy on overall strength and endurance.    6. Difficulty Ambulation  -Continue with therapy.  -Power wheelchair.    There are no Patient Instructions on file for this visit.  Plan of care reviewed with patient at the conclusion of today's visit. Education was provided regarding diagnosis, management and any prescribed or recommended OTC medications.  Patient verbalizes understanding of and agreement with management plan.    Follow Up  Return for Next Scheduled Follow up.    GUILLERMO Zamudio     Transcribed from ambient dictation for GUILLERMO Zamudio by Angie Khan.  01/29/24   12:44 EST    Patient or patient representative verbalized consent to the visit recording.  I have personally performed the services described in this document as transcribed by the above individual, and it is both accurate and complete.

## 2024-01-30 ENCOUNTER — HOSPITAL ENCOUNTER (OUTPATIENT)
Dept: CARDIOLOGY | Facility: HOSPITAL | Age: 69
Discharge: HOME | End: 2024-01-30
Payer: COMMERCIAL

## 2024-01-30 ENCOUNTER — OFFICE VISIT (OUTPATIENT)
Dept: CARDIOLOGY | Facility: HOSPITAL | Age: 69
End: 2024-01-30
Payer: COMMERCIAL

## 2024-01-30 VITALS
TEMPERATURE: 97.3 F | HEART RATE: 70 BPM | RESPIRATION RATE: 18 BRPM | BODY MASS INDEX: 25.42 KG/M2 | DIASTOLIC BLOOD PRESSURE: 62 MMHG | SYSTOLIC BLOOD PRESSURE: 131 MMHG | WEIGHT: 113 LBS | HEIGHT: 56 IN | OXYGEN SATURATION: 94 %

## 2024-01-30 DIAGNOSIS — E78.5 HYPERLIPIDEMIA LDL GOAL <70: ICD-10-CM

## 2024-01-30 DIAGNOSIS — I10 ESSENTIAL (PRIMARY) HYPERTENSION: ICD-10-CM

## 2024-01-30 DIAGNOSIS — I25.810 CORONARY ARTERY DISEASE INVOLVING CORONARY BYPASS GRAFT OF NATIVE HEART WITHOUT ANGINA PECTORIS: ICD-10-CM

## 2024-01-30 DIAGNOSIS — I25.810 CORONARY ARTERY DISEASE INVOLVING CORONARY BYPASS GRAFT OF NATIVE HEART WITHOUT ANGINA PECTORIS: Primary | ICD-10-CM

## 2024-01-30 PROCEDURE — 93005 ELECTROCARDIOGRAM TRACING: CPT | Performed by: NURSE PRACTITIONER

## 2024-01-30 RX ORDER — ROSUVASTATIN CALCIUM 20 MG/1
20 TABLET, COATED ORAL DAILY
Qty: 30 TABLET | Refills: 3 | Status: SHIPPED | OUTPATIENT
Start: 2024-01-30

## 2024-01-30 RX ORDER — PANTOPRAZOLE SODIUM 40 MG/1
40 TABLET, DELAYED RELEASE ORAL
Qty: 30 TABLET | Refills: 0 | Status: SHIPPED | OUTPATIENT
Start: 2024-01-30

## 2024-01-31 ENCOUNTER — HOME CARE VISIT (OUTPATIENT)
Dept: HOME HEALTH SERVICES | Facility: HOME HEALTHCARE | Age: 69
End: 2024-01-31
Payer: COMMERCIAL

## 2024-01-31 VITALS
HEART RATE: 78 BPM | DIASTOLIC BLOOD PRESSURE: 60 MMHG | OXYGEN SATURATION: 95 % | SYSTOLIC BLOOD PRESSURE: 112 MMHG | RESPIRATION RATE: 18 BRPM | TEMPERATURE: 97.9 F

## 2024-01-31 LAB
QT INTERVAL: 396 MS
QTC INTERVAL: 408 MS

## 2024-01-31 PROCEDURE — G0300 HHS/HOSPICE OF LPN EA 15 MIN: HCPCS

## 2024-01-31 PROCEDURE — G0153 HHCP-SVS OF S/L PATH,EA 15MN: HCPCS

## 2024-01-31 RX ORDER — TRAZODONE HYDROCHLORIDE 100 MG/1
200 TABLET ORAL NIGHTLY
Qty: 60 TABLET | Refills: 1 | Status: SHIPPED | OUTPATIENT
Start: 2024-01-31

## 2024-01-31 NOTE — HOME HEALTH
Routine Visit Note: LPN    Skill/education provided:   medication education, pain managementc, wound assessment. wound has healed pt using jergans lotion for moisturing. medications changes made to metotoprolo , and pepcid    Patient/caregiver response:, no falls reported. legs continue without wounds.

## 2024-01-31 NOTE — TELEPHONE ENCOUNTER
Caller: Jimmie Sheree A    Relationship: Self    Best call back number: [phone]     Requested Prescriptions:   Requested Prescriptions     Pending Prescriptions Disp Refills    traZODone (DESYREL) 100 MG tablet       Sig: Take 2 tablets by mouth Every Night. Indications: Trouble Sleeping        Pharmacy where request should be sent: [facility] [address] [place] [address] [phone] [place] [phone]      Last office visit with prescribing clinician: 1/29/2024   Last telemedicine visit with prescribing clinician: Visit date not found   Next office visit with prescribing clinician: 3/4/2024     Additional details provided by patient: 1 TABLET LEFT     Does the patient have less than a 3 day supply:  [x] Yes  [] No    Would you like a call back once the refill request has been completed: [] Yes [x] No    If the office needs to give you a call back, can they leave a voicemail: [] Yes [x] No    Rosey Denney Rep   01/31/24 15:49 EST

## 2024-02-01 ENCOUNTER — TELEPHONE (OUTPATIENT)
Dept: INTERNAL MEDICINE | Facility: CLINIC | Age: 69
End: 2024-02-01
Payer: COMMERCIAL

## 2024-02-01 VITALS
RESPIRATION RATE: 16 BRPM | DIASTOLIC BLOOD PRESSURE: 72 MMHG | SYSTOLIC BLOOD PRESSURE: 128 MMHG | TEMPERATURE: 98.5 F | HEART RATE: 75 BPM | OXYGEN SATURATION: 98 %

## 2024-02-01 NOTE — HOME HEALTH
Therapy interventions focused on improving independence with sequencing through home management tasks in order to increase safety awareness and judgement.  SLP educated pt. on using mediplanner to decrease difficulty in taking medication in addition to external alarms to improve pt's independence with medication administration.  Next session to focus on decreasing cues from SLP so that pt. is able to complete medication administration on her own.

## 2024-02-01 NOTE — TELEPHONE ENCOUNTER
Chief Complaint   Patient presents with   • Office Visit   • Blood Pressure Check       HISTORY OF PRESENT ILLNESS  Natasha 1964 female presents with complaints of Elevated blood pressure..     Fanta is a 58-year-old  female presenting to the clinic for follow-up on blood pressure medications and elevated blood pressure.  Patient states when she is at home her blood pressure is normally in the 130s over 80s.  Patient denies any dizziness chest pain shortness of breath.      Past Medical History:   Diagnosis Date   • Asthma    • Essential hypertension    • No known problems      Family History   Problem Relation Age of Onset   • Stroke Mother    • Hypertension Mother    • Patient is unaware of any medical problems Father      Social History     Tobacco Use   • Smoking status: Former     Current packs/day: 0.25     Types: Cigarettes   • Smokeless tobacco: Never   Vaping Use   • Vaping Use: never used   Substance Use Topics   • Alcohol use: Yes     Comment: once a year   • Drug use: Not Currently     ALLERGIES:  Patient has no known allergies.  Past Surgical History:   Procedure Laterality Date   • Dental surgery Right     2 dental implants right hand side lower      Current Medications    ALBUTEROL 108 (90 BASE) MCG/ACT INHALER    Inhale 2 puffs into the lungs every 4 hours as needed for Shortness of Breath.    BIMATOPROST (LUMIGAN) 0.03 % OPHTHALMIC DROPS        NAPROXEN (NAPROSYN) 500 MG TABLET    Take 1 tablet by mouth in the morning and 1 tablet in the evening. Take with meals.       Review of Systems   All other systems reviewed and are negative.      Physical Exam  Vitals and nursing note reviewed.   HENT:      Head: Normocephalic.   Eyes:      Pupils: Pupils are equal, round, and reactive to light.   Cardiovascular:      Rate and Rhythm: Normal rate and regular rhythm.      Pulses: Normal pulses.      Heart sounds: Normal heart sounds.   Pulmonary:      Effort: Pulmonary effort is normal.      Breath  Received order for [employer] power wheelchair. They are wanting office notes explaining the need for the power chair and the benefit. I called them because we were not the ordering provider. It was scheduled by Neisha HARRIS from [employer]. They have an evaluation scheduled for Friday with the patient. If you are in agreement please addend your note from 1/29/24 and I have a form for you to sign.    sounds: Normal breath sounds.   Abdominal:      General: Abdomen is flat.      Palpations: Abdomen is soft.   Musculoskeletal:         General: Normal range of motion.   Skin:     General: Skin is warm.      Capillary Refill: Capillary refill takes less than 2 seconds.   Neurological:      General: No focal deficit present.      Mental Status: She is alert.   Psychiatric:         Mood and Affect: Mood normal.         PHYSICAL EXAM  Visit Vitals  /72 (BP Location: RUE - Right upper extremity, Patient Position: Sitting, Cuff Size: Large Adult)   Pulse 76   Resp 16   Ht 5' 4\" (1.626 m)   Wt 95.9 kg (211 lb 8.5 oz)   SpO2 93%   BMI 36.31 kg/m²     Recent PHQ 2/9 Score    PHQ 2:  PHQ 2 Score Adult PHQ 2 Score Adult PHQ 2 Interpretation Little interest or pleasure in activity?   7/26/2023  10:22 AM 0 No further screening needed 0       PHQ 9:  PHQ 9 Score Adult PHQ 9 Score   6/29/2023  10:07 AM 0       DEPRESSION ASSESSMENT/PLAN:  Depression screening is negative no further plan needed.       ASSESSMENT @ PLAN     Problem List Items Addressed This Visit        Cardiac and Vasculature    Essential hypertension - Primary       She has missed 0 doses in the last 7 days.   No data recorded   Avg. Patient Reported BP:130 / 80    Relevant lab work:  Sodium (mmol/L)   Date Value   12/17/2022 141     Potassium (mmol/L)   Date Value   12/17/2022 4.1     Creatinine (mg/dL)   Date Value   12/17/2022 0.59     Glomerular Filtration Rate (no units)   Date Value   12/17/2022 >90       The 10-year ASCVD risk score (Lyric DK, et al., 2019) is: 3.9%    Values used to calculate the score:      Age: 58 years      Sex: Female      Is Non- : No      Diabetic: No      Tobacco smoker: No      Systolic Blood Pressure: 135 mmHg      Is BP treated: Yes      HDL Cholesterol: 44 mg/dL      Total Cholesterol: 157 mg/dL    Assessment/Recommendations:  BP goal: <130/80. Patient is not currently at goal.    Current HTN  Medications: Valsartan/hctz  Previously tried HTN Medications: Losartan    Plan for adjustments include: Patient will begin to take Diovan 160/25 mg  Provided education on appropriate administration and potential side effects of medication, including cough  Natasha verbalizes understanding of the above information and was given pharmacist contact information for any questions.    Follow-up:  Lab work to be completed: BMP             Relevant Medications    valsartan-hydrochlorothiazide (DIOVAN-HCT) 160-25 MG per tablet    Other Relevant Orders    Basic Metabolic Panel       Endocrine and Metabolic    BMI 36.0-36.9,adult     Counseled on diet and exercise         Class 2 obesity without serious comorbidity with body mass index (BMI) of 35.0 to 35.9 in adult       Genitourinary and Reproductive    Atypical squamous cells of undetermined significance on cytologic smear of cervix (ASC-US)     Discussed Pap smear results with patient advised may repeat her Pap in 1 year.  Although ASCCP recommends 3-year follow-up.             Instructions provided  as documented in the AVS.    Discussed with the patient possible differential diagnosis. The patient verbalized understanding all instructions.    Return in about 2 weeks (around 8/9/2023) for b/p  check .    Jayne Plata, CNP

## 2024-02-02 ENCOUNTER — HOME CARE VISIT (OUTPATIENT)
Dept: HOME HEALTH SERVICES | Facility: HOME HEALTHCARE | Age: 69
End: 2024-02-02
Payer: COMMERCIAL

## 2024-02-02 VITALS
RESPIRATION RATE: 18 BRPM | SYSTOLIC BLOOD PRESSURE: 124 MMHG | HEART RATE: 85 BPM | TEMPERATURE: 96.2 F | DIASTOLIC BLOOD PRESSURE: 62 MMHG | OXYGEN SATURATION: 97 %

## 2024-02-02 PROCEDURE — G0151 HHCP-SERV OF PT,EA 15 MIN: HCPCS

## 2024-02-05 ENCOUNTER — HOME CARE VISIT (OUTPATIENT)
Dept: HOME HEALTH SERVICES | Facility: HOME HEALTHCARE | Age: 69
End: 2024-02-05
Payer: COMMERCIAL

## 2024-02-05 VITALS
OXYGEN SATURATION: 93 % | HEART RATE: 92 BPM | SYSTOLIC BLOOD PRESSURE: 134 MMHG | TEMPERATURE: 97.7 F | DIASTOLIC BLOOD PRESSURE: 70 MMHG | RESPIRATION RATE: 18 BRPM

## 2024-02-05 PROCEDURE — G0151 HHCP-SERV OF PT,EA 15 MIN: HCPCS

## 2024-02-07 ENCOUNTER — HOME CARE VISIT (OUTPATIENT)
Dept: HOME HEALTH SERVICES | Facility: HOME HEALTHCARE | Age: 69
End: 2024-02-07
Payer: COMMERCIAL

## 2024-02-07 VITALS
DIASTOLIC BLOOD PRESSURE: 60 MMHG | TEMPERATURE: 97.6 F | RESPIRATION RATE: 18 BRPM | SYSTOLIC BLOOD PRESSURE: 120 MMHG | OXYGEN SATURATION: 96 % | HEART RATE: 78 BPM

## 2024-02-07 VITALS
OXYGEN SATURATION: 95 % | SYSTOLIC BLOOD PRESSURE: 122 MMHG | HEART RATE: 69 BPM | DIASTOLIC BLOOD PRESSURE: 78 MMHG | TEMPERATURE: 96.7 F

## 2024-02-07 PROCEDURE — G0300 HHS/HOSPICE OF LPN EA 15 MIN: HCPCS

## 2024-02-07 PROCEDURE — G0153 HHCP-SVS OF S/L PATH,EA 15MN: HCPCS

## 2024-02-07 NOTE — CASE COMMUNICATION
pioglitazone (ACTOS) 30 MG tablet    pt reports that she becomes very ill after taking this medication., n/v

## 2024-02-07 NOTE — HOME HEALTH
Routine Visit Note: LPN    Skill/education provided:   medication education, pain management,  wound has healed pt using jergans lotion for moisturing. medications changes made to pt reports not taking actos as it makes her vomit. MD notified.  Patient/caregiver response:, no falls reported. legs continue without wounds.    Pt ready for nsg discharge

## 2024-02-08 NOTE — HOME HEALTH
[facility] Discharge Summary/Summary of Care Provided: Pt. recieved ST services secondary to cognitive decline with memory loss, decreased safety awareness and judgement.  Pt. assessed using the MOCA with an initial score of 24/30 with noted deficits in delayed memory recall.  Pt. reported she has difficulty with recalling medication administration times and needed reminders for appointments.  POC was established to address deficits and SLP focused on education and training of external aides to improve recall of daily tasks and routines.  Pt's '[employer]' device was utilized to create reminders for am, noon, and evening medications, pt was educated on using her phone to log upcoming appointments/or paper calendar.  A mediplanner was implemented to improve ease with medication administration.  Pt. has been able to return demo filling mediplanner independently.     Patient received home health for diagnosis: Cognitive decline    Current level of functional ability: Pt. independent with daily tasks and routines from a cognitive standpoint.  She is independent in using her external aides to assist with these tasks.      Living arrangements: Pt. is currently living in her daughter's house while her house is being built. Living area is cluttered with furniture and multiple pets in the home.      Progress towards goals and/or Were all goals met? Yes    If not all goals met, barriers that prevented patient from meeting goals: NA  SDOH concerns (i.e. Caregiver availability, social isolation, environment, income, transportation access, food insecurity etc.): NA    Follow-up appointment plans and community resources provided:

## 2024-02-11 NOTE — CASE COMMUNICATION
so , she reports.  ----- Message -----  From: Delilah Becker APRN  Sent: 2/8/2024   8:10 AM EST  To: Tatiana Carrillo LPN      My understanding is that she has been on this medication for a long time. It is not a new order. Has she always gotten sick with its use?    ----- Message -----  From: Tatiana Carrillo LPN  Sent: 2/7/2024   2:50 PM EST  To: GUILLERMO Zamudio    pioglitazone (ACTOS) 30 MG tablet    pt reports that she becomes very  ill after taking this medication., n/v

## 2024-02-14 ENCOUNTER — HOME CARE VISIT (OUTPATIENT)
Dept: HOME HEALTH SERVICES | Facility: HOME HEALTHCARE | Age: 69
End: 2024-02-14
Payer: COMMERCIAL

## 2024-02-14 VITALS
OXYGEN SATURATION: 95 % | DIASTOLIC BLOOD PRESSURE: 54 MMHG | SYSTOLIC BLOOD PRESSURE: 124 MMHG | HEART RATE: 55 BPM | RESPIRATION RATE: 18 BRPM | TEMPERATURE: 96.2 F

## 2024-02-14 PROCEDURE — G0151 HHCP-SERV OF PT,EA 15 MIN: HCPCS

## 2024-02-15 ENCOUNTER — HOME CARE VISIT (OUTPATIENT)
Dept: HOME HEALTH SERVICES | Facility: HOME HEALTHCARE | Age: 69
End: 2024-02-15
Payer: COMMERCIAL

## 2024-02-15 VITALS
SYSTOLIC BLOOD PRESSURE: 118 MMHG | HEART RATE: 62 BPM | RESPIRATION RATE: 16 BRPM | DIASTOLIC BLOOD PRESSURE: 64 MMHG | TEMPERATURE: 98.1 F | OXYGEN SATURATION: 96 %

## 2024-02-15 PROCEDURE — G0495 RN CARE TRAIN/EDU IN HH: HCPCS

## 2024-02-16 RX ORDER — PRAMIPEXOLE DIHYDROCHLORIDE 0.12 MG/1
0.12 TABLET ORAL 3 TIMES DAILY
Qty: 90 TABLET | Refills: 1 | Status: SHIPPED | OUTPATIENT
Start: 2024-02-16

## 2024-02-16 RX ORDER — TIZANIDINE 4 MG/1
4 TABLET ORAL 3 TIMES DAILY PRN
Qty: 90 TABLET | Refills: 1 | Status: SHIPPED | OUTPATIENT
Start: 2024-02-16

## 2024-02-21 NOTE — PROGRESS NOTES
New Cardiology Patient Office Visit      Date: 2024  Patient Name: Sheree Samuel  : 1955   MRN: [id number]   PCP: Delilah Becker APRN   Referring Provider: Odalys Alvarado A*     Chief Complaint:    Chief Complaint   Patient presents with    Coronary Artery Disease       History of Present Illness: Sheree Samuel is a 68 y.o. female who is here today for establish care as a cardiologist.  Patient has a past history of coronary artery disease which includes coronary artery bypass graft along with few cath afterwards.  She also has a history of pulmonary clots.  She has been doing fine lately.  She denies any chest pain any shortness of breath any dizziness any palpitations or any other symptoms.  She is able to move slowly but unable to do most of her works without any problem.      Problem List   CARDIAC  Coronary Artery Disease:   Last cath 2018: Left main 10%, LAD with patent LIMA but no significant disease, circumflex normal RCA 50%, LIMA-LAD patent, SVG-PDA patent, SVG-OM patent     Myocardium:   Echo, 10/26/2023: LVEF 60%     Valvular:   MAC, AV calcification     Electrical:   NSR     Pericardium:   Normal       CARDIAC RISK FACTORS:  Hypertension  Diabetes  Dyslipidemia    NON-CARDIAC  Anxiety  Arthritis  Asthma  COPD  DVT  Depression  Visual impairment    SURGERIES  Cholecystectomy  Hysterectomy  Joint replacement  Oophorectomy      Subjective      Review of Systems:   Review of Systems   Respiratory: Negative.     Cardiovascular:  Positive for leg swelling.       Medications:   Current Outpatient Medications   Medication Sig Dispense Refill    acetaminophen (TYLENOL) 500 MG tablet Take 1 tablet by mouth Every 6 (Six) Hours As Needed for Mild Pain.      albuterol (PROVENTIL HFA;VENTOLIN HFA) 108 (90 Base) MCG/ACT inhaler Inhale 2 puffs Every 4 (Four) Hours As Needed for Wheezing. Indications: Chronic Obstructive Lung Disease      aspirin 81 MG chewable tablet Chew 1 tablet Daily.  Indications: Disease involving Lipid Deposits in the Arteries      Blood Glucose Monitoring Suppl (Accu-Chek Guide Me) w/Device kit USE TO CHECK GLUCOSE AS DIRECTED ONCE DAILY      busPIRone (BUSPAR) 7.5 MG tablet Take 1 tablet by mouth 3 (Three) Times a Day. Indications: Anxiety Disorder, Major Depressive Disorder 270 tablet 1    Cyanocobalamin (Vitamin B 12) 500 MCG tablet Take 1 tablet by mouth Daily.      Diclofenac Sodium (VOLTAREN) 1 % gel gel Apply 2 g topically to the appropriate area as directed 2 (Two) Times a Day As Needed (knee pain). 350 g 1    Fluticasone-Salmeterol (ADVAIR/WIXELA) 250-50 MCG/ACT DISKUS Inhale 1 puff Daily As Needed. Indications: Chronic Obstructive Lung Disease      glucose blood test strip Use to check blood sugars once daily, use strips covered by insurance. 100 each 12    Lancets misc Use one daily to check blood sugars, use lancets covered by insurance. 100 each 1    lidocaine (LIDODERM) 5 % Place 1 patch on the skin as directed by provider Daily As Needed. Remove & Discard patch within 12 hours or as directed by MD      metFORMIN (GLUCOPHAGE) 1000 MG tablet Take 1 tablet by mouth 2 (Two) Times a Day With Meals. Indications: Type 2 Diabetes 180 tablet 1    Multiple Vitamins-Minerals (CENTRUM SILVER PO) Take 1 tablet by mouth Daily. Indications: nutritional supplement      O2 (OXYGEN) Inhale 2 L/min Daily As Needed. Indications: oxygen support      pain patient supplied pump Continuous. Morphine. Patient dose not know the setting, dose, or how much is delivered a day. Can give a bolus every 6 hours. Next refill 11.19.23. Dr[facility] Marshall at [facility] is managing.  Indications: pain      pramipexole (MIRAPEX) 0.125 MG tablet Take 1 tablet by mouth 3 (Three) Times a Day. Indications: Restless Leg Syndrome 90 tablet 1    rosuvastatin (CRESTOR) 20 MG tablet Take 1 tablet by mouth Daily. 30 tablet 3    sertraline (ZOLOFT) 100 MG tablet Take 2 tablets by mouth Daily. Indications:  "Generalized Anxiety Disorder, Major Depressive Disorder 90 tablet 1    tiZANidine (ZANAFLEX) 4 MG tablet Take 1 tablet by mouth 3 (Three) Times a Day As Needed for Muscle Spasms. Indications: Muscle Spasticity, Musculoskeletal Pain 90 tablet 1    Eliquis 2.5 MG tablet tablet TAKE 1 TABLET BY MOUTH EVERY 12 HOURS 60 tablet 0    gabapentin (NEURONTIN) 400 MG capsule Take 1 capsule by mouth 3 (Three) Times a Day. Indications: Diabetes with Nerve Disease 90 capsule 0    sennosides-docusate (PERICOLACE) 8.6-50 MG per tablet Take 1 tablet by mouth Daily As Needed.      traZODone (DESYREL) 100 MG tablet Take 2 tablets by mouth Every Night as directed for Trouble Sleeping 60 tablet 1     No current facility-administered medications for this visit.           The following portions of the patient's history were reviewed and updated as appropriate: allergies, current medications, past family history, past medical history, past social history, past surgical history and problem list.    Objective     Physical Exam:  Vital Signs:   Vitals:    02/22/24 1419 02/22/24 1420   BP: 122/56 108/56   BP Location: Left arm Right arm   Patient Position: Sitting Sitting   Pulse:  61   SpO2: 94%    Weight: 52.2 kg (115 lb)    Height: 152.4 cm (60\")      Body mass index is 22.46 kg/m².     Constitutional:       General: Not in acute distress.     Appearance: Healthy appearance. Not in distress.     Neck:     JVP: Not elevated     Carotid artery: No carotid bruit    Pulmonary:      Effort: Pulmonary effort is normal.      Breath sounds: Normal breath sounds. No wheezing. No rhonchi. No rales.     Cardiovascular:      Normal rate. Regular rhythm. Normal S1. Normal S2.      Murmurs: There is no murmur.      No gallop. No click. No rub.     Abdominal:      General: Bowel sounds are normal.      Palpations: Abdomen is soft.      Tenderness: There is no abdominal tenderness.    Extremities:     Pulses: Good pulses     Edema: No edema    Labs:  Lab " Results   Component Value Date    GLUCOSE 79 12/31/2023    BUN 27 (H) 12/31/2023    CREATININE 0.77 12/31/2023    EGFRIFNONA 44 (L) 07/12/2018    EGFRIFAFRI 77 08/22/2016    BCR 35.1 (H) 12/31/2023    K 4.8 12/31/2023    CO2 32.0 (H) 12/31/2023    CALCIUM 8.9 12/31/2023    ALBUMIN 2.9 (L) 12/31/2023    AST 15 12/31/2023    ALT 12 12/31/2023     Lab Results   Component Value Date    WBC 6.16 12/31/2023    HGB 9.5 (L) 12/31/2023    HCT 31.1 (L) 12/31/2023    MCV 86.6 12/31/2023     12/31/2023     Lab Results   Component Value Date    CHOL 167 12/05/2023    TRIG 84 12/05/2023    HDL 44 12/05/2023     (H) 12/05/2023     Lab Results   Component Value Date    TSH 2.110 12/05/2023     Lab Results   Component Value Date    HGBA1C 6.1 (A) 03/04/2024           ECG 12 Lead    Date/Time: 2/22/2024 2:52 PM  Performed by: Isaias Leija MD    Authorized by: Isaias Leija MD  Comparison: compared with previous ECG from 1/20/2023  Similar to previous ECG  Rhythm: sinus rhythm  Other findings: left ventricular hypertrophy with strain    Clinical impression: abnormal EKG          Smoking Cessation:   Tobacco Product History : Patient never smoked    Advance Care Planning   ACP discussion was declined by the patient. Patient does not have an advance directive, declines further assistance.            Assessment / Plan      Assessment:   Diagnosis Plan   1. Coronary artery disease involving native coronary artery of native heart with angina pectoris  ECG 12 Lead      2. Hyperlipidemia LDL goal <70        3. Essential (primary) hypertension             Plan:  Patient has been stable on her coronary artery disease.  Her last cath in 2018 showed no significant disease.  She is going to continue taking aspirin and Crestor for it.  Patient has been on Eliquis for pulmonary embolism will continue on low-dose.  Her cholesterol has been stable and she will continue with the current medication.  I do not believe at this time  patient need any significant change in her medications.            Follow Up:   Return in about 1 year (around 2/22/2025).    Isaias Leija MD

## 2024-02-22 ENCOUNTER — OFFICE VISIT (OUTPATIENT)
Dept: CARDIOLOGY | Facility: CLINIC | Age: 69
End: 2024-02-22
Payer: COMMERCIAL

## 2024-02-22 VITALS
HEIGHT: 60 IN | BODY MASS INDEX: 22.58 KG/M2 | DIASTOLIC BLOOD PRESSURE: 56 MMHG | WEIGHT: 115 LBS | SYSTOLIC BLOOD PRESSURE: 108 MMHG | HEART RATE: 61 BPM | OXYGEN SATURATION: 94 %

## 2024-02-22 DIAGNOSIS — E78.5 HYPERLIPIDEMIA LDL GOAL <70: ICD-10-CM

## 2024-02-22 DIAGNOSIS — I10 ESSENTIAL (PRIMARY) HYPERTENSION: ICD-10-CM

## 2024-02-22 DIAGNOSIS — I25.119 CORONARY ARTERY DISEASE INVOLVING NATIVE CORONARY ARTERY OF NATIVE HEART WITH ANGINA PECTORIS: Primary | ICD-10-CM

## 2024-02-22 PROCEDURE — 99204 OFFICE O/P NEW MOD 45 MIN: CPT | Performed by: INTERNAL MEDICINE

## 2024-02-22 PROCEDURE — 93000 ELECTROCARDIOGRAM COMPLETE: CPT | Performed by: INTERNAL MEDICINE

## 2024-02-23 ENCOUNTER — HOME CARE VISIT (OUTPATIENT)
Dept: HOME HEALTH SERVICES | Facility: HOME HEALTHCARE | Age: 69
End: 2024-02-23
Payer: COMMERCIAL

## 2024-02-23 VITALS
HEART RATE: 74 BPM | RESPIRATION RATE: 18 BRPM | OXYGEN SATURATION: 96 % | TEMPERATURE: 96.2 F | DIASTOLIC BLOOD PRESSURE: 54 MMHG | SYSTOLIC BLOOD PRESSURE: 118 MMHG

## 2024-02-23 PROCEDURE — G0151 HHCP-SERV OF PT,EA 15 MIN: HCPCS

## 2024-02-27 ENCOUNTER — HOME CARE VISIT (OUTPATIENT)
Dept: HOME HEALTH SERVICES | Facility: HOME HEALTHCARE | Age: 69
End: 2024-02-27
Payer: COMMERCIAL

## 2024-02-27 VITALS
RESPIRATION RATE: 18 BRPM | DIASTOLIC BLOOD PRESSURE: 66 MMHG | TEMPERATURE: 96.8 F | OXYGEN SATURATION: 92 % | HEART RATE: 94 BPM | SYSTOLIC BLOOD PRESSURE: 126 MMHG

## 2024-02-27 PROCEDURE — G0151 HHCP-SERV OF PT,EA 15 MIN: HCPCS

## 2024-02-27 RX ORDER — APIXABAN 2.5 MG/1
2.5 TABLET, FILM COATED ORAL EVERY 12 HOURS
Qty: 60 TABLET | Refills: 0 | Status: SHIPPED | OUTPATIENT
Start: 2024-02-27

## 2024-03-04 ENCOUNTER — OFFICE VISIT (OUTPATIENT)
Dept: INTERNAL MEDICINE | Facility: CLINIC | Age: 69
End: 2024-03-04
Payer: COMMERCIAL

## 2024-03-04 VITALS
DIASTOLIC BLOOD PRESSURE: 54 MMHG | HEIGHT: 60 IN | HEART RATE: 76 BPM | TEMPERATURE: 97.8 F | WEIGHT: 114 LBS | BODY MASS INDEX: 22.38 KG/M2 | SYSTOLIC BLOOD PRESSURE: 126 MMHG

## 2024-03-04 DIAGNOSIS — G47.00 INSOMNIA, UNSPECIFIED TYPE: ICD-10-CM

## 2024-03-04 DIAGNOSIS — G89.29 CHRONIC PAIN OF BOTH KNEES: ICD-10-CM

## 2024-03-04 DIAGNOSIS — E11.9 TYPE 2 DIABETES MELLITUS WITHOUT COMPLICATION, WITHOUT LONG-TERM CURRENT USE OF INSULIN: Primary | ICD-10-CM

## 2024-03-04 DIAGNOSIS — M25.561 CHRONIC PAIN OF BOTH KNEES: ICD-10-CM

## 2024-03-04 DIAGNOSIS — G62.9 NEUROPATHY: ICD-10-CM

## 2024-03-04 DIAGNOSIS — M25.562 CHRONIC PAIN OF BOTH KNEES: ICD-10-CM

## 2024-03-04 LAB
EXPIRATION DATE: ABNORMAL
HBA1C MFR BLD: 6.1 % (ref 4.5–5.7)
Lab: ABNORMAL

## 2024-03-04 RX ORDER — TRAZODONE HYDROCHLORIDE 100 MG/1
200 TABLET ORAL NIGHTLY
Qty: 60 TABLET | Refills: 1 | Status: SHIPPED | OUTPATIENT
Start: 2024-03-04

## 2024-03-04 RX ORDER — GABAPENTIN 400 MG/1
400 CAPSULE ORAL 3 TIMES DAILY
Qty: 90 CAPSULE | Refills: 0 | Status: SHIPPED | OUTPATIENT
Start: 2024-03-04

## 2024-03-04 RX ORDER — GABAPENTIN 400 MG/1
400 CAPSULE ORAL 3 TIMES DAILY
Qty: 90 CAPSULE | Refills: 0 | Status: CANCELLED | OUTPATIENT
Start: 2024-03-04
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
## 2024-03-05 DIAGNOSIS — G47.00 INSOMNIA, UNSPECIFIED TYPE: ICD-10-CM

## 2024-03-05 RX ORDER — TRAZODONE HYDROCHLORIDE 100 MG/1
200 TABLET ORAL NIGHTLY
Qty: 60 TABLET | Refills: 1 | Status: CANCELLED | OUTPATIENT
Start: 2024-03-05

## 2024-03-05 NOTE — TELEPHONE ENCOUNTER
Caller: Sheree Samuel    Relationship: Self    Best call back number: [phone]     Requested Prescriptions:   Requested Prescriptions     Pending Prescriptions Disp Refills    traZODone (DESYREL) 100 MG tablet 60 tablet 1     Sig: Take 2 tablets by mouth Every Night as directed for Trouble Sleeping        Pharmacy where request should be sent: [facility]     Last office visit with prescribing clinician: 3/4/2024   Last telemedicine visit with prescribing clinician: Visit date not found   Next office visit with prescribing clinician: 6/4/2024     Additional details provided by patient: NEEDS ALL OF HER MEDICATIONS SENT TO [facility] TO BE MAILED TO HER.     Does the patient have less than a 3 day supply:  [x] Yes  OUT FOR 4 DAYS OF TRAzODONE    Would you like a call back once the refill request has been completed: [x] Yes [] No    If the office needs to give you a call back, can they leave a voicemail: [x] Yes [] No    Rosey Baer Rep   03/05/24 16:22 EST

## 2024-03-08 ENCOUNTER — OFFICE VISIT (OUTPATIENT)
Dept: ORTHOPEDIC SURGERY | Facility: CLINIC | Age: 69
End: 2024-03-08
Payer: COMMERCIAL

## 2024-03-08 VITALS
HEIGHT: 60 IN | BODY MASS INDEX: 22.38 KG/M2 | DIASTOLIC BLOOD PRESSURE: 80 MMHG | WEIGHT: 114 LBS | SYSTOLIC BLOOD PRESSURE: 122 MMHG

## 2024-03-08 DIAGNOSIS — Z96.651 PAIN DUE TO TOTAL RIGHT KNEE REPLACEMENT, INITIAL ENCOUNTER: ICD-10-CM

## 2024-03-08 DIAGNOSIS — M25.562 BILATERAL CHRONIC KNEE PAIN: ICD-10-CM

## 2024-03-08 DIAGNOSIS — G89.29 BILATERAL CHRONIC KNEE PAIN: ICD-10-CM

## 2024-03-08 DIAGNOSIS — T84.84XA PAIN DUE TO TOTAL RIGHT KNEE REPLACEMENT, INITIAL ENCOUNTER: ICD-10-CM

## 2024-03-08 DIAGNOSIS — M17.12 PRIMARY OSTEOARTHRITIS OF LEFT KNEE: Primary | ICD-10-CM

## 2024-03-08 DIAGNOSIS — M25.561 BILATERAL CHRONIC KNEE PAIN: ICD-10-CM

## 2024-03-08 RX ORDER — TRIAMCINOLONE ACETONIDE 40 MG/ML
2 INJECTION, SUSPENSION INTRA-ARTICULAR; INTRAMUSCULAR
Status: COMPLETED | OUTPATIENT
Start: 2024-03-08 | End: 2024-03-08

## 2024-03-08 RX ORDER — LIDOCAINE HYDROCHLORIDE 10 MG/ML
3 INJECTION, SOLUTION EPIDURAL; INFILTRATION; INTRACAUDAL; PERINEURAL
Status: COMPLETED | OUTPATIENT
Start: 2024-03-08 | End: 2024-03-08

## 2024-03-08 RX ORDER — BUPIVACAINE HYDROCHLORIDE 2.5 MG/ML
3 INJECTION, SOLUTION EPIDURAL; INFILTRATION; INTRACAUDAL
Status: COMPLETED | OUTPATIENT
Start: 2024-03-08 | End: 2024-03-08

## 2024-03-08 RX ADMIN — BUPIVACAINE HYDROCHLORIDE 3 ML: 2.5 INJECTION, SOLUTION EPIDURAL; INFILTRATION; INTRACAUDAL at 12:12

## 2024-03-08 RX ADMIN — TRIAMCINOLONE ACETONIDE 2 ML: 40 INJECTION, SUSPENSION INTRA-ARTICULAR; INTRAMUSCULAR at 12:12

## 2024-03-08 RX ADMIN — LIDOCAINE HYDROCHLORIDE 3 ML: 10 INJECTION, SOLUTION EPIDURAL; INFILTRATION; INTRACAUDAL; PERINEURAL at 12:12

## 2024-03-08 NOTE — PROGRESS NOTES
Procedure   - Large Joint Arthrocentesis: L knee on 3/8/2024 12:12 PM  Indications: pain  Details: 21 G needle, superolateral approach  Medications: 3 mL bupivacaine (PF) 0.25 %; 2 mL triamcinolone acetonide 40 MG/ML; 3 mL lidocaine PF 1% 1 %  Outcome: tolerated well, no immediate complications  Procedure, treatment alternatives, risks and benefits explained, specific risks discussed. Consent was given by the patient. Immediately prior to procedure a time out was called to verify the correct patient, procedure, equipment, support staff and site/side marked as required. Patient was prepped and draped in the usual sterile fashion.
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
## 2024-03-12 ENCOUNTER — TELEPHONE (OUTPATIENT)
Dept: NEUROLOGY | Facility: CLINIC | Age: 69
End: 2024-03-12
Payer: COMMERCIAL

## 2024-03-12 DIAGNOSIS — S06.5XAA SDH (SUBDURAL HEMATOMA): Primary | ICD-10-CM

## 2024-03-12 DIAGNOSIS — Z87.820 HISTORY OF MULTIPLE CONCUSSIONS: ICD-10-CM

## 2024-03-12 DIAGNOSIS — R41.3 MEMORY LOSS: ICD-10-CM

## 2024-03-13 NOTE — TELEPHONE ENCOUNTER
neuropsychological testing was completed on 1/24/2024.  She has been diagnosed with a mild neurocognitive disorder.  The neuropsychologist feels like this is related to several conditions.      He recommends that she continue to see sleep medicine or reestablish with sleep medicine to manage her sleep apnea.      She should continue managing her vascular risk factors with taking her medications for her heart condition and her cholesterol and diabetes medication.      There was also a recommendation that she may benefit from seeing psychiatry to improve management of anxiety and depression.    He did recommend repeat imaging of her brain. I ordered a CT of her head with and without contrast, however, central scheduling contacted her 3 times and never were able to schedule her. I am putting in another order for this to be done before she comes back to see us. He also recommended more labs, but she has already had labs completed in December 2023.    If she needs any referrals placed for sleep medicine or psychiatry-please let me know.    She does follow with pulmonology already.    Would also make sure to speak with her .    Thanks, GUILLERMO Marcos

## 2024-03-18 ENCOUNTER — OFFICE VISIT (OUTPATIENT)
Dept: PULMONOLOGY | Facility: CLINIC | Age: 69
End: 2024-03-18
Payer: COMMERCIAL

## 2024-03-18 VITALS
HEART RATE: 67 BPM | WEIGHT: 108 LBS | SYSTOLIC BLOOD PRESSURE: 150 MMHG | DIASTOLIC BLOOD PRESSURE: 70 MMHG | BODY MASS INDEX: 21.2 KG/M2 | TEMPERATURE: 98.6 F | HEIGHT: 60 IN | OXYGEN SATURATION: 99 %

## 2024-03-18 DIAGNOSIS — I27.82 CHRONIC PULMONARY EMBOLISM, UNSPECIFIED PULMONARY EMBOLISM TYPE, UNSPECIFIED WHETHER ACUTE COR PULMONALE PRESENT: Primary | ICD-10-CM

## 2024-03-18 DIAGNOSIS — Z86.69 HISTORY OF SLEEP APNEA: ICD-10-CM

## 2024-03-18 DIAGNOSIS — U07.1 COVID-19 VIRUS INFECTION: ICD-10-CM

## 2024-03-18 PROBLEM — D89.831 CYTOKINE RELEASE SYNDROME, GRADE 1: Status: RESOLVED | Noted: 2023-12-31 | Resolved: 2024-03-18

## 2024-03-18 PROBLEM — R79.89 OTHER SPECIFIED ABNORMAL FINDINGS OF BLOOD CHEMISTRY: Status: RESOLVED | Noted: 2018-10-29 | Resolved: 2024-03-18

## 2024-03-18 PROBLEM — R05.1 ACUTE COUGH: Status: RESOLVED | Noted: 2023-12-26 | Resolved: 2024-03-18

## 2024-03-18 PROBLEM — I20.0 UNSTABLE ANGINA: Status: RESOLVED | Noted: 2018-06-27 | Resolved: 2024-03-18

## 2024-03-18 PROBLEM — J18.9 PNEUMONIA: Status: RESOLVED | Noted: 2023-10-26 | Resolved: 2024-03-18

## 2024-03-18 PROBLEM — I26.99 PULMONARY EMBOLISM: Status: RESOLVED | Noted: 2023-12-13 | Resolved: 2024-03-18

## 2024-03-18 PROBLEM — J44.9 CHRONIC OBSTRUCTIVE LUNG DISEASE: Status: RESOLVED | Noted: 2023-12-14 | Resolved: 2024-03-18

## 2024-03-18 PROCEDURE — 99203 OFFICE O/P NEW LOW 30 MIN: CPT | Performed by: INTERNAL MEDICINE
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
## 2024-03-20 ENCOUNTER — PATIENT ROUNDING (BHMG ONLY) (OUTPATIENT)
Dept: NEUROLOGY | Facility: CLINIC | Age: 69
End: 2024-03-20
Payer: COMMERCIAL

## 2024-03-20 ENCOUNTER — TELEPHONE (OUTPATIENT)
Dept: INTERNAL MEDICINE | Facility: CLINIC | Age: 69
End: 2024-03-20
Payer: COMMERCIAL

## 2024-03-20 NOTE — TELEPHONE ENCOUNTER
Ok, thank you. If she needs referral for psychiatry or sleep medicine, she can let us or her PCP know. We will follow up as scheduled in clinic. Thanks, Sandra

## 2024-03-20 NOTE — TELEPHONE ENCOUNTER
I HAVE CALLED [facility] AND VERIFIED THE FAX NUMBER [phone].  I HAVE RE-FAXED THE 03/04/2024 [facility] DME PAPERS AND THE CHART NOTE TO THAT FAX NUMBER.    I HAVE CALLED THE PT AND LET HER KNOW THAT THIS HAS BEEN DONE.

## 2024-03-20 NOTE — TELEPHONE ENCOUNTER
Please send most recent office note to [facility] along with the DME document scanned 3/4/24. Fax number is on the scanned document.

## 2024-03-20 NOTE — TELEPHONE ENCOUNTER
Provider: ROSA FLORIAN    Caller: CARLITO JOHNSON      Phone Number: [phone]     Reason for Call: PATIENT IS CALLING O SEE IF ROS[facility] FLORIAN [facility]CE[facility]OWER WHEEL CHAIR.   PHONE # [phone]

## 2024-04-02 DIAGNOSIS — F33.2 MAJOR DEPRESSIVE DISORDER, RECURRENT SEVERE WITHOUT PSYCHOTIC FEATURES: ICD-10-CM

## 2024-04-02 DIAGNOSIS — G62.9 NEUROPATHY: ICD-10-CM

## 2024-04-02 RX ORDER — GABAPENTIN 400 MG/1
400 CAPSULE ORAL 3 TIMES DAILY
Qty: 90 CAPSULE | Refills: 0 | Status: SHIPPED | OUTPATIENT
Start: 2024-04-02

## 2024-04-02 RX ORDER — SERTRALINE HYDROCHLORIDE 100 MG/1
200 TABLET, FILM COATED ORAL DAILY
Qty: 90 TABLET | Refills: 1 | Status: SHIPPED | OUTPATIENT
Start: 2024-04-02

## 2024-04-02 NOTE — TELEPHONE ENCOUNTER
Caller: Sheree Samuel    Relationship: Self    Best call back number: [phone]     Requested Prescriptions:   Requested Prescriptions     Pending Prescriptions Disp Refills    sertraline (ZOLOFT) 100 MG tablet 90 tablet 1     Sig: Take 2 tablets by mouth Daily. Indications: Generalized Anxiety Disorder, Major Depressive Disorder    gabapentin (NEURONTIN) 400 MG capsule 90 capsule 0     Sig: Take 1 capsule by mouth 3 (Three) Times a Day. Indications: Diabetes with Nerve Disease    apixaban (Eliquis) 2.5 MG tablet tablet 60 tablet 0     Sig: Take 1 tablet by mouth Every 12 (Twelve) Hours.     -TRAZODONE (DESYREL) 100MG    Pharmacy where request should be sent: [facility]     Last office visit with prescribing clinician: 3/4/2024   Last telemedicine visit with prescribing clinician: Visit date not found   Next office visit with prescribing clinician: 6/4/2024     Additional details provided by patient:   PATIENT IS COMPLETELY OUT OF THE SERTRALINE PRESCRIPTION AND HAS A COUPLE DAYS LEFT OF THE GABAPENTIN PRESCRIPTION.    PATIENT HAS 1 REMAINING OF THE TRAZODONE PRESCRIPTION AND DOES HAVE A REFILL AVAILABLE AT THE PHARMACY, HOWEVER, SHE STATED THAT SHE DOES NOT KNOW HOW TO USE MYCHART TO REQUEST REFILLS.    Does the patient have less than a 3 day supply:  [x] Yes  [] No    Would you like a call back once the refill request has been completed: [] Yes [x] No    If the office needs to give you a call back, can they leave a voicemail: [] Yes [x] No    Rosey Kerr Rep   04/02/24 14:59 EDT

## 2024-04-03 ENCOUNTER — APPOINTMENT (OUTPATIENT)
Dept: GENERAL RADIOLOGY | Facility: HOSPITAL | Age: 69
End: 2024-04-03
Payer: COMMERCIAL

## 2024-04-03 ENCOUNTER — HOSPITAL ENCOUNTER (EMERGENCY)
Facility: HOSPITAL | Age: 69
Discharge: HOME | End: 2024-04-03
Attending: EMERGENCY MEDICINE | Admitting: EMERGENCY MEDICINE
Payer: COMMERCIAL

## 2024-04-03 ENCOUNTER — APPOINTMENT (OUTPATIENT)
Dept: CT IMAGING | Facility: HOSPITAL | Age: 69
End: 2024-04-03
Payer: COMMERCIAL

## 2024-04-03 VITALS
TEMPERATURE: 98.3 F | OXYGEN SATURATION: 97 % | BODY MASS INDEX: 24.3 KG/M2 | WEIGHT: 108 LBS | SYSTOLIC BLOOD PRESSURE: 149 MMHG | HEIGHT: 56 IN | DIASTOLIC BLOOD PRESSURE: 76 MMHG | HEART RATE: 75 BPM | RESPIRATION RATE: 20 BRPM

## 2024-04-03 DIAGNOSIS — M54.50 ACUTE MIDLINE LOW BACK PAIN WITHOUT SCIATICA: ICD-10-CM

## 2024-04-03 DIAGNOSIS — G44.319 ACUTE POST-TRAUMATIC HEADACHE, NOT INTRACTABLE: ICD-10-CM

## 2024-04-03 DIAGNOSIS — D64.9 NORMOCYTIC ANEMIA: ICD-10-CM
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
## 2024-04-03 LAB
ALBUMIN SERPL-MCNC: 3.8 G/DL (ref 3.5–5.2)
ALBUMIN/GLOB SERPL: 1.2 G/DL
ALP SERPL-CCNC: 105 U/L (ref 39–117)
ALT SERPL W P-5'-P-CCNC: 12 U/L (ref 1–33)
ANION GAP SERPL CALCULATED.3IONS-SCNC: 7 MMOL/L (ref 5–15)
AST SERPL-CCNC: 12 U/L (ref 1–32)
BASOPHILS # BLD AUTO: 0.02 10*3/MM3 (ref 0–0.2)
BASOPHILS NFR BLD AUTO: 0.2 % (ref 0–1.5)
BILIRUB SERPL-MCNC: 0.2 MG/DL (ref 0–1.2)
BUN SERPL-MCNC: 23 MG/DL (ref 8–23)
BUN/CREAT SERPL: 34.3 (ref 7–25)
CALCIUM SPEC-SCNC: 9.6 MG/DL (ref 8.6–10.5)
CHLORIDE SERPL-SCNC: 97 MMOL/L (ref 98–107)
CO2 SERPL-SCNC: 30 MMOL/L (ref 22–29)
CREAT SERPL-MCNC: 0.67 MG/DL (ref 0.57–1)
DEPRECATED RDW RBC AUTO: 49.5 FL (ref 37–54)
EGFRCR SERPLBLD CKD-EPI 2021: 95.3 ML/MIN/1.73
EOSINOPHIL # BLD AUTO: 0.03 10*3/MM3 (ref 0–0.4)
EOSINOPHIL NFR BLD AUTO: 0.4 % (ref 0.3–6.2)
ERYTHROCYTE [DISTWIDTH] IN BLOOD BY AUTOMATED COUNT: 15.3 % (ref 12.3–15.4)
GLOBULIN UR ELPH-MCNC: 3.3 GM/DL
GLUCOSE SERPL-MCNC: 150 MG/DL (ref 65–99)
HCT VFR BLD AUTO: 35.4 % (ref 34–46.6)
HGB BLD-MCNC: 10.9 G/DL (ref 12–15.9)
IMM GRANULOCYTES # BLD AUTO: 0.02 10*3/MM3 (ref 0–0.05)
IMM GRANULOCYTES NFR BLD AUTO: 0.2 % (ref 0–0.5)
LYMPHOCYTES # BLD AUTO: 0.69 10*3/MM3 (ref 0.7–3.1)
LYMPHOCYTES NFR BLD AUTO: 8.6 % (ref 19.6–45.3)
MCH RBC QN AUTO: 27 PG (ref 26.6–33)
MCHC RBC AUTO-ENTMCNC: 30.8 G/DL (ref 31.5–35.7)
MCV RBC AUTO: 87.8 FL (ref 79–97)
MONOCYTES # BLD AUTO: 0.52 10*3/MM3 (ref 0.1–0.9)
MONOCYTES NFR BLD AUTO: 6.5 % (ref 5–12)
NEUTROPHILS NFR BLD AUTO: 6.78 10*3/MM3 (ref 1.7–7)
NEUTROPHILS NFR BLD AUTO: 84.1 % (ref 42.7–76)
NRBC BLD AUTO-RTO: 0 /100 WBC (ref 0–0.2)
PLATELET # BLD AUTO: 241 10*3/MM3 (ref 140–450)
PMV BLD AUTO: 10.6 FL (ref 6–12)
POTASSIUM SERPL-SCNC: 3.7 MMOL/L (ref 3.5–5.2)
PROT SERPL-MCNC: 7.1 G/DL (ref 6–8.5)
RBC # BLD AUTO: 4.03 10*6/MM3 (ref 3.77–5.28)
SODIUM SERPL-SCNC: 134 MMOL/L (ref 136–145)
WBC NRBC COR # BLD AUTO: 8.06 10*3/MM3 (ref 3.4–10.8)

## 2024-04-03 PROCEDURE — 70450 CT HEAD/BRAIN W/O DYE: CPT

## 2024-04-03 PROCEDURE — 36415 COLL VENOUS BLD VENIPUNCTURE: CPT

## 2024-04-03 PROCEDURE — 72072 X-RAY EXAM THORAC SPINE 3VWS: CPT

## 2024-04-03 PROCEDURE — 85025 COMPLETE CBC W/AUTO DIFF WBC: CPT | Performed by: EMERGENCY MEDICINE

## 2024-04-03 PROCEDURE — 63710000001 ONDANSETRON ODT 4 MG TABLET DISPERSIBLE: Performed by: EMERGENCY MEDICINE

## 2024-04-03 PROCEDURE — 80053 COMPREHEN METABOLIC PANEL: CPT | Performed by: EMERGENCY MEDICINE

## 2024-04-03 PROCEDURE — 72100 X-RAY EXAM L-S SPINE 2/3 VWS: CPT

## 2024-04-03 PROCEDURE — 99284 EMERGENCY DEPT VISIT MOD MDM: CPT

## 2024-04-03 RX ORDER — OXYCODONE HYDROCHLORIDE 5 MG/1
5 TABLET ORAL ONCE
Status: COMPLETED | OUTPATIENT
Start: 2024-04-03 | End: 2024-04-03

## 2024-04-03 RX ORDER — ACETAMINOPHEN 500 MG
1000 TABLET ORAL ONCE
Status: COMPLETED | OUTPATIENT
Start: 2024-04-03 | End: 2024-04-03

## 2024-04-03 RX ORDER — ONDANSETRON 4 MG/1
4 TABLET, ORALLY DISINTEGRATING ORAL ONCE
Status: COMPLETED | OUTPATIENT
Start: 2024-04-03 | End: 2024-04-03

## 2024-04-03 RX ORDER — LIDOCAINE 50 MG/G
1 PATCH TOPICAL EVERY 24 HOURS
Qty: 7 PATCH | Refills: 0 | Status: SHIPPED | OUTPATIENT
Start: 2024-04-03 | End: 2024-04-10

## 2024-04-03 RX ADMIN — ONDANSETRON 4 MG: 4 TABLET, ORALLY DISINTEGRATING ORAL at 07:23

## 2024-04-03 RX ADMIN — ACETAMINOPHEN 1000 MG: 500 TABLET ORAL at 05:42

## 2024-04-03 RX ADMIN — OXYCODONE 5 MG: 5 TABLET ORAL at 05:42
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
## 2024-04-03 NOTE — DISCHARGE INSTRUCTIONS
Continue using your home pain medications.  You can also use Tylenol and prescribed lidocaine patches for additional relief.  Use extra caution to avoid any further falls or injuries.  Return to the ER as needed for new or worsening symptoms

## 2024-04-17 ENCOUNTER — TELEPHONE (OUTPATIENT)
Dept: INTERNAL MEDICINE | Facility: CLINIC | Age: 69
End: 2024-04-17
Payer: COMMERCIAL

## 2024-04-17 ENCOUNTER — TELEPHONE (OUTPATIENT)
Dept: INTERNAL MEDICINE | Facility: CLINIC | Age: 69
End: 2024-04-17

## 2024-04-17 NOTE — TELEPHONE ENCOUNTER
I advised patient to call [employer] in regards to these letters since they are the one processing the order for the powerchair. She said she would do this and if she continues to have issues she will contact me.

## 2024-04-17 NOTE — TELEPHONE ENCOUNTER
Caller: Sheree Samuel    Relationship: Self    Best call back number     What is the best time to reach you: ANYTIME    Who are you requesting to speak with (clinical staff, provider,  specific staff member): CLINICAL STAFF    Do you know the name of the person who called: SHEREE    What was the call regarding: PATIENT HAS 4 REFERRALS AND SHE NEEDS GUIDANCE ON THESE REFERRALS AS WHERE DOES NEED TO GO, THEY WERE  ALL FOR OPTHOMOLOGY    Is it okay if the provider responds through MyChart: PHONE CALL

## 2024-04-17 NOTE — TELEPHONE ENCOUNTER
"  Caller: Sheree Samuel    Relationship: Self    Best call back number:      What is the best time to reach you: ANYTIME    Who are you requesting to speak with (clinical staff, provider,  specific staff member): CLINICAL STAFF    Do you know the name of the person who called: SHEREE    What was the call regarding: PATIENT RECEIVED 2 LETTERS FROM HER INSURANCE SAID SHE HAS BEEN APPROVED FOR A POWER CHAIR,  AND ANOTHER LETTER STATED ALL \"THE [employer]\" WERENT APPROVED FOR THE WHEELCHAIR; SHE IS ASKING WHAT DO WE NEED TO DO TO PROCEED?     Is it okay if the provider responds through MyChart: CALL BACK      "

## 2024-04-17 NOTE — TELEPHONE ENCOUNTER
I HAVE SPOKEN WITH THE PT.  WE CAME TO THE CONCLUSION THAT DR GRIDER WAS REFERRING TO ANOTHER OPHTHALMOLOGIST.  SHE WILL CALL DR GRIDER'S OFFICE TO SEE IF THEY THEIR NUMBER.

## 2024-04-30 ENCOUNTER — OFFICE VISIT (OUTPATIENT)
Dept: INTERNAL MEDICINE | Facility: CLINIC | Age: 69
End: 2024-04-30
Payer: COMMERCIAL

## 2024-04-30 VITALS
DIASTOLIC BLOOD PRESSURE: 54 MMHG | HEIGHT: 56 IN | SYSTOLIC BLOOD PRESSURE: 116 MMHG | BODY MASS INDEX: 25.87 KG/M2 | TEMPERATURE: 97.5 F | WEIGHT: 115 LBS | HEART RATE: 56 BPM
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
## 2024-04-30 DIAGNOSIS — E11.9 TYPE 2 DIABETES MELLITUS WITHOUT COMPLICATION, WITHOUT LONG-TERM CURRENT USE OF INSULIN: ICD-10-CM

## 2024-04-30 PROCEDURE — 99214 OFFICE O/P EST MOD 30 MIN: CPT

## 2024-04-30 RX ORDER — AMLODIPINE BESYLATE 5 MG/1
5 TABLET ORAL DAILY
COMMUNITY

## 2024-04-30 RX ORDER — FAMOTIDINE 20 MG/1
20 TABLET, FILM COATED ORAL NIGHTLY PRN
COMMUNITY

## 2024-04-30 RX ORDER — BLOOD-GLUCOSE METER
1 EACH MISCELLANEOUS DAILY
Qty: 1 KIT | Refills: 0 | Status: SHIPPED | OUTPATIENT
Start: 2024-04-30

## 2024-04-30 RX ORDER — LANCETS
1 EACH MISCELLANEOUS DAILY
Qty: 100 EACH | Refills: 1 | Status: SHIPPED | OUTPATIENT
Start: 2024-04-30
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
## 2024-05-02 ENCOUNTER — TELEPHONE (OUTPATIENT)
Dept: INTERNAL MEDICINE | Facility: CLINIC | Age: 69
End: 2024-05-02
Payer: COMMERCIAL

## 2024-05-02 NOTE — TELEPHONE ENCOUNTER
Call placed to  in reference to Ms. Arizmendi pending eye procedure.  They stated that they were waiting on Dr. Miles or Dr. Sharma to place the order for surgery and then they would call Ms. Samuel to schedule the surgery appointment.    Call placed to Ms. Samuel and made her aware of above information.  Verbalized understanding that she is pending a phone call to schedule surgery.

## 2024-05-03 ENCOUNTER — TELEPHONE (OUTPATIENT)
Dept: ORTHOPEDIC SURGERY | Facility: CLINIC | Age: 69
End: 2024-05-03
Payer: COMMERCIAL

## 2024-05-03 DIAGNOSIS — M17.12 PRIMARY OSTEOARTHRITIS OF LEFT KNEE: Primary | ICD-10-CM

## 2024-05-03 NOTE — TELEPHONE ENCOUNTER
LVM with pt requesting she give me a call back to discuss her message (no verbal release on file allowing me to leave a detailed message).    HUB OK TO RELAY: If pt returns call, please let her know an order has been placed for her to try the gel injections in her left knee but she will not be able to get these injections in her right knee since it has been replaced; Also let her know that once we get authorization from her insurance, we will give her a call to schedule if approved.    Jess RUELAS CMA ([facility]), ROT

## 2024-05-03 NOTE — TELEPHONE ENCOUNTER
Provider: MARK    Caller: CARLITO    Relationship to Patient: SELF    Pharmacy:     Phone Number: [phone]    Reason for Call: PT CALLED AND STATED THAT PCP RECOMMENDED A GEL INJ IN HER KNEES AND PT WOULD LIKE TO START THAT PROCESS

## 2024-05-08 DIAGNOSIS — G62.9 NEUROPATHY: ICD-10-CM

## 2024-05-08 DIAGNOSIS — I10 ESSENTIAL (PRIMARY) HYPERTENSION: Primary | ICD-10-CM

## 2024-05-08 RX ORDER — GABAPENTIN 400 MG/1
400 CAPSULE ORAL 3 TIMES DAILY
Qty: 90 CAPSULE | Refills: 0 | Status: CANCELLED | OUTPATIENT
Start: 2024-05-08

## 2024-05-09 RX ORDER — AMLODIPINE BESYLATE 5 MG/1
5 TABLET ORAL DAILY
Qty: 90 TABLET | Refills: 1 | Status: SHIPPED | OUTPATIENT
Start: 2024-05-09

## 2024-05-09 RX ORDER — GABAPENTIN 400 MG/1
400 CAPSULE ORAL 3 TIMES DAILY
Qty: 90 CAPSULE | Refills: 0 | Status: SHIPPED | OUTPATIENT
Start: 2024-05-09

## 2024-05-10 ENCOUNTER — TELEPHONE (OUTPATIENT)
Dept: INTERNAL MEDICINE | Facility: CLINIC | Age: 69
End: 2024-05-10
Payer: COMMERCIAL

## 2024-05-14 ENCOUNTER — TELEMEDICINE (OUTPATIENT)
Dept: INTERNAL MEDICINE | Facility: CLINIC | Age: 69
End: 2024-05-14
Payer: COMMERCIAL

## 2024-05-14 DIAGNOSIS — I10 ESSENTIAL (PRIMARY) HYPERTENSION: Primary | ICD-10-CM

## 2024-05-14 DIAGNOSIS — I27.82 CHRONIC PULMONARY EMBOLISM, UNSPECIFIED PULMONARY EMBOLISM TYPE, UNSPECIFIED WHETHER ACUTE COR PULMONALE PRESENT: ICD-10-CM

## 2024-05-14 PROCEDURE — 99214 OFFICE O/P EST MOD 30 MIN: CPT
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
## 2024-05-15 RX ORDER — ROSUVASTATIN CALCIUM 20 MG/1
20 TABLET, COATED ORAL DAILY
Qty: 90 TABLET | Refills: 1 | Status: SHIPPED | OUTPATIENT
Start: 2024-05-15

## 2024-05-15 RX ORDER — TRAZODONE HYDROCHLORIDE 100 MG/1
200 TABLET ORAL NIGHTLY
Qty: 180 TABLET | Refills: 1 | Status: SHIPPED | OUTPATIENT
Start: 2024-05-15

## 2024-05-15 NOTE — TELEPHONE ENCOUNTER
Caller: Sheree Samuel    Relationship: Self    Best call back number: [phone]     Requested Prescriptions:   Requested Prescriptions     Pending Prescriptions Disp Refills    rosuvastatin (CRESTOR) 20 MG tablet 30 tablet 3     Sig: Take 1 tablet by mouth Daily.    metFORMIN (GLUCOPHAGE) 1000 MG tablet 180 tablet 1     Sig: Take 1 tablet by mouth 2 (Two) Times a Day With Meals.    traZODone (DESYREL) 100 MG tablet 60 tablet 1     Sig: Take 2 tablets by mouth Every Night.        Pharmacy where request should be sent: [facility]     Last office visit with prescribing clinician: 4/30/2024   Last telemedicine visit with prescribing clinician: 5/14/2024   Next office visit with prescribing clinician: 6/4/2024     Does the patient have less than a 3 day supply:  [x] Yes  [] No    Would you like a call back once the refill request has been completed: [] Yes [x] No    If the office needs to give you a call back, can they leave a voicemail: [] Yes [x] No    Rosey Tyler Rep   05/15/24 10:18 EDT

## 2024-05-29 ENCOUNTER — TELEPHONE (OUTPATIENT)
Dept: INTERNAL MEDICINE | Facility: CLINIC | Age: 69
End: 2024-05-29
Payer: COMMERCIAL

## 2024-05-29 RX ORDER — PRAMIPEXOLE DIHYDROCHLORIDE 0.12 MG/1
0.12 TABLET ORAL 3 TIMES DAILY
Qty: 270 TABLET | Refills: 1 | Status: SHIPPED | OUTPATIENT
Start: 2024-05-29

## 2024-05-29 RX ORDER — TIZANIDINE 4 MG/1
4 TABLET ORAL 3 TIMES DAILY PRN
Qty: 90 TABLET | Refills: 1 | Status: SHIPPED | OUTPATIENT
Start: 2024-05-29

## 2024-05-29 NOTE — TELEPHONE ENCOUNTER
Rx Refill Note  Requested Prescriptions     Pending Prescriptions Disp Refills    tiZANidine (ZANAFLEX) 4 MG tablet 90 tablet 1     Sig: Take 1 tablet by mouth 3 (Three) Times a Day As Needed for muscle spasms    pramipexole (MIRAPEX) 0.125 MG tablet 270 tablet 1     Sig: Take 1 tablet by mouth 3 times a day.      Last office visit with prescribing clinician: 4/30/2024   Last telemedicine visit with prescribing clinician: 5/14/2024   Next office visit with prescribing clinician: 6/4/2024                         Would you like a call back once the refill request has been completed: [] Yes [] No    If the office needs to give you a call back, can they leave a voicemail: [] Yes [] No    Alejandra Mendoza LPN  05/29/24, 14:40 EDT

## 2024-05-29 NOTE — TELEPHONE ENCOUNTER
PT WILL BE LEAVING FOR [place] ON THE 6TH AND HAS SEVERAL PRESCRIPTIONS THAT SHE NEEDS TO HAVE REFILLED EARLY.    PT DIDN'T SAY WHAT MEDICATIONS SHE NEEDS REFILLED.  PLEASE GIVE THE PT A CALL.

## 2024-06-04 ENCOUNTER — OFFICE VISIT (OUTPATIENT)
Dept: INTERNAL MEDICINE | Facility: CLINIC | Age: 69
End: 2024-06-04
Payer: COMMERCIAL

## 2024-06-04 VITALS
BODY MASS INDEX: 26.85 KG/M2 | TEMPERATURE: 98.2 F | HEART RATE: 64 BPM | SYSTOLIC BLOOD PRESSURE: 108 MMHG | DIASTOLIC BLOOD PRESSURE: 50 MMHG | HEIGHT: 55 IN | WEIGHT: 116 LBS

## 2024-06-04 DIAGNOSIS — G89.29 CHRONIC PAIN OF LEFT KNEE: ICD-10-CM

## 2024-06-04 DIAGNOSIS — K21.9 GASTROESOPHAGEAL REFLUX DISEASE WITHOUT ESOPHAGITIS: ICD-10-CM

## 2024-06-04 DIAGNOSIS — M25.562 CHRONIC PAIN OF LEFT KNEE: ICD-10-CM

## 2024-06-04 DIAGNOSIS — Z12.31 BREAST CANCER SCREENING BY MAMMOGRAM: ICD-10-CM

## 2024-06-04 DIAGNOSIS — Z00.00 ANNUAL PHYSICAL EXAM: Primary | ICD-10-CM

## 2024-06-04 DIAGNOSIS — Z13.820 ENCOUNTER FOR OSTEOPOROSIS SCREENING IN ASYMPTOMATIC POSTMENOPAUSAL PATIENT: ICD-10-CM

## 2024-06-04 DIAGNOSIS — E11.9 TYPE 2 DIABETES MELLITUS WITHOUT COMPLICATION, WITHOUT LONG-TERM CURRENT USE OF INSULIN: ICD-10-CM

## 2024-06-04 DIAGNOSIS — Z78.0 ENCOUNTER FOR OSTEOPOROSIS SCREENING IN ASYMPTOMATIC POSTMENOPAUSAL PATIENT: ICD-10-CM

## 2024-06-04 LAB
EXPIRATION DATE: ABNORMAL
HBA1C MFR BLD: 6.3 % (ref 4.5–5.7)
Lab: ABNORMAL

## 2024-06-04 PROCEDURE — 83036 HEMOGLOBIN GLYCOSYLATED A1C: CPT

## 2024-06-04 PROCEDURE — 99214 OFFICE O/P EST MOD 30 MIN: CPT

## 2024-06-04 PROCEDURE — 99397 PER PM REEVAL EST PAT 65+ YR: CPT

## 2024-06-04 RX ORDER — OMEPRAZOLE 20 MG/1
20 CAPSULE, DELAYED RELEASE ORAL DAILY PRN
Qty: 30 CAPSULE | Refills: 0 | Status: SHIPPED | OUTPATIENT
Start: 2024-06-04

## 2024-06-04 NOTE — PROGRESS NOTES
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 are experiencing any of these symptoms.  General instructions  See your health care provider for regular wellness exams and vaccines. This may include:  Scheduling regular health, dental, and eye exams.  Getting and maintaining your vaccines. These include:  Influenza vaccine. Get this vaccine each year before the flu season begins.  Pneumonia vaccine.  Shingles vaccine.  Tetanus, diphtheria, and pertussis (Tdap) booster vaccine.  Your health care provider may also recommend other immunizations.  Tell your health care provider if you have ever been abused or do not feel safe at home.  Summary  Menopause is a normal process in which your ability to get pregnant comes to an end.  This condition causes hot flashes, night sweats, decreased interest in sex, mood swings, headaches, or lack of sleep.  Treatment for this condition may include hormone replacement therapy.  Take actions to keep yourself healthy, including exercising regularly, eating a healthy diet, watching your weight, and checking your blood pressure and blood sugar levels.  Get screened for cancer and depression. Make sure that you are up to date with all your vaccines.  This  information is not intended to replace advice given to you by your health care provider. Make sure you discuss any questions you have with your health care provider.        Counseling/Anticipatory Guidance:   Plan of care reviewed with patient at the conclusion of today's visit. Education was provided in regards to diagnosis, diet and exercise, cervical cancer screening, self breast exams, breast cancer screening, and the importance of yearly mammograms.   Nutrition, family planning/contraception, physical activity, healthy weight,ways to reduce stress, adequate sleep, injury prevention, misuse of tobacco, alcohol and drugs, sexual behavior and STD's, dental health, mental health, and immunizations.    Management and any prescribed or recommended OTC medications.  Patient verbalizes understanding of and agreement with management plan.    Follow Up  Return in about 3 months (around 9/4/2024) for Recheck.    GUILLERMO Velásquez  [facility]

## 2024-06-05 ENCOUNTER — TELEPHONE (OUTPATIENT)
Dept: INTERNAL MEDICINE | Facility: CLINIC | Age: 69
End: 2024-06-05
Payer: COMMERCIAL

## 2024-06-05 DIAGNOSIS — Z13.820 ENCOUNTER FOR OSTEOPOROSIS SCREENING IN ASYMPTOMATIC POSTMENOPAUSAL PATIENT: Primary | ICD-10-CM

## 2024-06-05 DIAGNOSIS — Z78.0 ENCOUNTER FOR OSTEOPOROSIS SCREENING IN ASYMPTOMATIC POSTMENOPAUSAL PATIENT: Primary | ICD-10-CM

## 2024-06-05 DIAGNOSIS — Z12.31 BREAST CANCER SCREENING BY MAMMOGRAM: Primary | ICD-10-CM

## 2024-06-05 NOTE — TELEPHONE ENCOUNTER
I have been in contact with Jovita Cline via email in regards to powerchair order. He needs your note to be more specific statement about the need/recommendation for a new PWC.

## 2024-06-06 NOTE — TELEPHONE ENCOUNTER
I added some specific occurrences of why she is experiencing more frequent falls to include chronic back pain and chronic bilateral knee pain.

## 2024-06-07 ENCOUNTER — TELEPHONE (OUTPATIENT)
Dept: ORTHOPEDIC SURGERY | Facility: CLINIC | Age: 69
End: 2024-06-07
Payer: COMMERCIAL

## 2024-06-07 NOTE — TELEPHONE ENCOUNTER
CALLED PATIENT ABOUT GEL INJECTIONS. HER INSURANCE IS NEEDING A STATUS UPDATE ABOUT HOW SHE DID WITH HER CORTISONE INJECTION. SHE HAS NOT FOLLOWED UP SINCE SHE RECEIVED IT. THEY NEED TO KNOW HOW SHE DID, BEFORE THEY MOVE FORWARD WITH THEIR DECISION. THEY NEED THE INFORMATION WITHIN 10 DAYS OR THEY WILL DENY THE REQUEST.     THE PATIENT IS CURRENTLY IN [place], AND WILL NOT BE BACK UNTIL THE 19TH. WOULD IT BE POSSIBLE TO DO A TELEPHONE OR VIRTUAL VISIT WITH THE PATIENT?     THEY ALSO NEED TO KNOW THAT THE PATIENT HAS FAILED TOPICAL NSAID'S IF THAT CAN BE NOTED AS WELL.       PATIENT'S CALL BACK # [phone] - OK TO [facility] IF SHE DOESN'T ANSWER.

## 2024-06-07 NOTE — TELEPHONE ENCOUNTER
I spoke with the patient to let her know we would have to have an in person visit. The patient stated that was okay. It looks like she had an appointment with Melody scheduled for 6/20/24 I asked if that still worked for her she stated yes she will be there.     Sandra Lin

## 2024-06-13 DIAGNOSIS — G62.9 NEUROPATHY: ICD-10-CM

## 2024-06-13 RX ORDER — GABAPENTIN 400 MG/1
400 CAPSULE ORAL 3 TIMES DAILY
Qty: 90 CAPSULE | Refills: 0 | Status: SHIPPED | OUTPATIENT
Start: 2024-06-13

## 2024-06-13 NOTE — TELEPHONE ENCOUNTER
Rx Refill Note  Requested Prescriptions     Pending Prescriptions Disp Refills    gabapentin (NEURONTIN) 400 MG capsule 90 capsule 0     Sig: Take 1 capsule by mouth 3 (Three) Times a Day. Indications: Diabetes with Nerve Disease      Last office visit with prescribing clinician: 6/4/24  Last telemedicine visit with prescribing clinician: 5/14/2024   Next office visit with prescribing clinician: 9/5/24     LA: 05/09/24 #90 0R                          Would you like a call back once the refill request has been completed: [] Yes [] No    If the office needs to give you a call back, can they leave a voicemail: [] Yes [] No    Alejandra Mendoza LPN  06/13/24, 09:47 EDT

## 2024-06-20 ENCOUNTER — OFFICE VISIT (OUTPATIENT)
Dept: ORTHOPEDIC SURGERY | Facility: CLINIC | Age: 69
End: 2024-06-20
Payer: COMMERCIAL

## 2024-06-20 VITALS
WEIGHT: 116 LBS | SYSTOLIC BLOOD PRESSURE: 110 MMHG | DIASTOLIC BLOOD PRESSURE: 64 MMHG | HEIGHT: 55 IN | BODY MASS INDEX: 26.85 KG/M2

## 2024-06-20 DIAGNOSIS — G89.29 CHRONIC PAIN OF LEFT KNEE: Primary | ICD-10-CM

## 2024-06-20 DIAGNOSIS — M25.562 CHRONIC PAIN OF LEFT KNEE: Primary | ICD-10-CM

## 2024-06-20 DIAGNOSIS — M17.12 PRIMARY OSTEOARTHRITIS OF LEFT KNEE: ICD-10-CM

## 2024-06-20 DIAGNOSIS — Z79.01 LONG TERM CURRENT USE OF ANTICOAGULANT: ICD-10-CM
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
## 2024-07-03 ENCOUNTER — LAB (OUTPATIENT)
Dept: LAB | Facility: HOSPITAL | Age: 69
End: 2024-07-03
Payer: COMMERCIAL

## 2024-07-03 ENCOUNTER — OFFICE VISIT (OUTPATIENT)
Dept: NEUROLOGY | Facility: CLINIC | Age: 69
End: 2024-07-03
Payer: COMMERCIAL

## 2024-07-03 VITALS
DIASTOLIC BLOOD PRESSURE: 68 MMHG | BODY MASS INDEX: 26.85 KG/M2 | OXYGEN SATURATION: 97 % | WEIGHT: 116 LBS | SYSTOLIC BLOOD PRESSURE: 132 MMHG | HEART RATE: 71 BPM | HEIGHT: 55 IN

## 2024-07-03 DIAGNOSIS — R41.3 MEMORY LOSS: Primary | ICD-10-CM

## 2024-07-03 DIAGNOSIS — F06.71 MILD NEUROCOGNITIVE DISORDER DUE TO MULTIPLE ETIOLOGIES, WITH BEHAVIORAL DISTURBANCE: ICD-10-CM

## 2024-07-03 DIAGNOSIS — R41.3 MEMORY LOSS: ICD-10-CM

## 2024-07-03 DIAGNOSIS — Z87.898 HISTORY OF SEIZURES: ICD-10-CM

## 2024-07-03 DIAGNOSIS — Z87.820 HISTORY OF MULTIPLE CONCUSSIONS: ICD-10-CM

## 2024-07-03 DIAGNOSIS — Z86.69 HISTORY OF SLEEP APNEA: ICD-10-CM

## 2024-07-03 DIAGNOSIS — Z74.09 IMPAIRED FUNCTIONAL MOBILITY, BALANCE, GAIT, AND ENDURANCE: ICD-10-CM

## 2024-07-03 PROBLEM — H49.12: Status: ACTIVE | Noted: 2024-04-11

## 2024-07-03 PROBLEM — H50.34 ALTERNATING INTERMITTENT EXOTROPIA: Status: ACTIVE | Noted: 2024-04-11

## 2024-07-03 PROBLEM — Z96.1 PRESENCE OF INTRAOCULAR LENS: Status: ACTIVE | Noted: 2024-04-11

## 2024-07-03 PROBLEM — R41.89 COGNITIVE DECLINE: Status: ACTIVE | Noted: 2024-03-11

## 2024-07-03 PROBLEM — H90.3 ASYMMETRIC SNHL (SENSORINEURAL HEARING LOSS): Status: ACTIVE | Noted: 2018-08-31

## 2024-07-03 PROBLEM — S09.90XA CLOSED HEAD INJURY: Status: ACTIVE | Noted: 2024-04-16

## 2024-07-03 PROBLEM — H26.493 OTHER SECONDARY CATARACT, BILATERAL: Status: ACTIVE | Noted: 2024-04-11

## 2024-07-03 PROBLEM — H93.13 BILATERAL TINNITUS: Status: ACTIVE | Noted: 2018-08-31

## 2024-07-03 LAB
ANION GAP SERPL CALCULATED.3IONS-SCNC: 13.3 MMOL/L (ref 5–15)
BUN SERPL-MCNC: 17 MG/DL (ref 8–23)
BUN/CREAT SERPL: 19.1 (ref 7–25)
CALCIUM SPEC-SCNC: 9.9 MG/DL (ref 8.6–10.5)
CHLORIDE SERPL-SCNC: 99 MMOL/L (ref 98–107)
CO2 SERPL-SCNC: 27.7 MMOL/L (ref 22–29)
CREAT SERPL-MCNC: 0.89 MG/DL (ref 0.57–1)
EGFRCR SERPLBLD CKD-EPI 2021: 70.7 ML/MIN/1.73
FOLATE SERPL-MCNC: >20 NG/ML (ref 4.78–24.2)
GLUCOSE SERPL-MCNC: 160 MG/DL (ref 65–99)
POTASSIUM SERPL-SCNC: 3.9 MMOL/L (ref 3.5–5.2)
SODIUM SERPL-SCNC: 140 MMOL/L (ref 136–145)
T4 FREE SERPL-MCNC: 1.1 NG/DL (ref 0.92–1.68)
TSH SERPL DL<=0.05 MIU/L-ACNC: 1.83 UIU/ML (ref 0.27–4.2)
VIT B12 BLD-MCNC: 282 PG/ML (ref 211–946)

## 2024-07-03 PROCEDURE — 36415 COLL VENOUS BLD VENIPUNCTURE: CPT

## 2024-07-03 PROCEDURE — 84443 ASSAY THYROID STIM HORMONE: CPT

## 2024-07-03 PROCEDURE — 99215 OFFICE O/P EST HI 40 MIN: CPT | Performed by: NURSE PRACTITIONER

## 2024-07-03 PROCEDURE — 83921 ORGANIC ACID SINGLE QUANT: CPT

## 2024-07-03 PROCEDURE — 82607 VITAMIN B-12: CPT

## 2024-07-03 PROCEDURE — 86592 SYPHILIS TEST NON-TREP QUAL: CPT

## 2024-07-03 PROCEDURE — 82746 ASSAY OF FOLIC ACID SERUM: CPT

## 2024-07-03 PROCEDURE — 84439 ASSAY OF FREE THYROXINE: CPT

## 2024-07-03 PROCEDURE — 80048 BASIC METABOLIC PNL TOTAL CA: CPT
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
## 2024-07-03 NOTE — PATIENT INSTRUCTIONS
CALL [facility] SLEEP MEDICINE TO SCHEDULE AN INITIAL [phone]    CONTINUE MEETING WITH PSYCHIATRY.    Get labs done today at outpatient center.

## 2024-07-04 LAB — RPR SER QL: NORMAL

## 2024-07-05 ENCOUNTER — TELEPHONE (OUTPATIENT)
Dept: NEUROLOGY | Facility: CLINIC | Age: 69
End: 2024-07-05
Payer: COMMERCIAL

## 2024-07-05 DIAGNOSIS — F33.2 MAJOR DEPRESSIVE DISORDER, RECURRENT SEVERE WITHOUT PSYCHOTIC FEATURES: ICD-10-CM

## 2024-07-05 NOTE — PROGRESS NOTES
Please notify Sheree that her labs are essentially normal with the exception of her Vitamin B12 level is low at 282. We ideally like to see this above 400. I have on her medication list that she is taking Vitamin B12 500mcg daily-if she is still currently taking this, I would recommend she increase her dose to Vitamin B12 1,000 mcg daily (double) over the counter. If she has not been taking the B12 supplement, then I recommend she restart at 500mcg daily. Thanks, GUILLERMO Heath PCP

## 2024-07-05 NOTE — TELEPHONE ENCOUNTER
Caller: King Shereebeatrice Leger    Relationship: Self    Best call back number: [phone]     Requested Prescriptions:   Requested Prescriptions     Pending Prescriptions Disp Refills    sertraline (ZOLOFT) 100 MG tablet 90 tablet 1     Sig: Take 2 tablets by mouth Daily.        Pharmacy where request should be sent: [facility]     Last office visit with prescribing clinician: 6/4/2024   Last telemedicine visit with prescribing clinician: 5/14/2024   Next office visit with prescribing clinician: 9/5/2024       THIS NEEDS TO BE SENT TO [facility]     Does the patient have less than a 3 day supply:  [] Yes  [x] No      Rosey Nathan Rep   07/05/24 13:38 EDT

## 2024-07-05 NOTE — TELEPHONE ENCOUNTER
----- Message from Sandra Mckoy sent at 7/5/2024  7:59 AM EDT -----  Please notify Shreee that her labs are essentially normal with the exception of her Vitamin B12 level is low at 282. We ideally like to see this above 400. I have on her medication list that she is taking Vitamin B12 500mcg daily-if she is still currently taking this, I would recommend she increase her dose to Vitamin B12 1,000 mcg daily (double) over the counter. If she has not been taking the B12 supplement, then I recommend she restart at 500mcg daily. Thanks, Sandra Mckoy, GUILLERMO PIÑA PCP

## 2024-07-08 LAB — METHYLMALONATE SERPL-SCNC: 767 NMOL/L (ref 0–378)

## 2024-07-08 RX ORDER — SERTRALINE HYDROCHLORIDE 100 MG/1
200 TABLET, FILM COATED ORAL DAILY
Qty: 90 TABLET | Refills: 1 | Status: SHIPPED | OUTPATIENT
Start: 2024-07-08

## 2024-07-08 NOTE — PROGRESS NOTES
Vitamin B12 levels indicate true deficiency. I recommended Sheree double her vitamin B12 from 500mcg daily over the counter to 1000 mcg daily over the counter. I am going to reach out to PCP to see if they would be able to set her up for some Vitamin B12 shots to give her a jump start on improving the Vitamin B12 deficiency. They should be in touch with her. Thanks, GUILLERMO Heath

## 2024-07-09 ENCOUNTER — CLINICAL SUPPORT (OUTPATIENT)
Dept: INTERNAL MEDICINE | Facility: CLINIC | Age: 69
End: 2024-07-09
Payer: COMMERCIAL

## 2024-07-09 DIAGNOSIS — E53.8 B12 DEFICIENCY: Primary | ICD-10-CM

## 2024-07-09 PROCEDURE — 96372 THER/PROPH/DIAG INJ SC/IM: CPT

## 2024-07-09 RX ORDER — CYANOCOBALAMIN 1000 UG/ML
1000 INJECTION, SOLUTION INTRAMUSCULAR; SUBCUTANEOUS
Status: SHIPPED | OUTPATIENT
Start: 2024-08-30 | End: 2024-11-22

## 2024-07-09 RX ORDER — CYANOCOBALAMIN 1000 UG/ML
1000 INJECTION, SOLUTION INTRAMUSCULAR; SUBCUTANEOUS WEEKLY
Status: SHIPPED | OUTPATIENT
Start: 2024-07-09 | End: 2024-08-06

## 2024-07-09 RX ADMIN — CYANOCOBALAMIN 1000 MCG: 1000 INJECTION, SOLUTION INTRAMUSCULAR; SUBCUTANEOUS at 13:28

## 2024-07-11 ENCOUNTER — CLINICAL SUPPORT (OUTPATIENT)
Dept: ORTHOPEDIC SURGERY | Facility: CLINIC | Age: 69
End: 2024-07-11
Payer: COMMERCIAL

## 2024-07-11 DIAGNOSIS — M17.12 PRIMARY OSTEOARTHRITIS OF LEFT KNEE: ICD-10-CM

## 2024-07-11 RX ORDER — HYALURONATE SODIUM 10 MG/ML
20 SYRINGE (ML) INTRAARTICULAR
Status: COMPLETED | OUTPATIENT
Start: 2024-07-11 | End: 2024-07-11

## 2024-07-11 RX ADMIN — Medication 20 MG: at 13:26

## 2024-07-11 NOTE — PROGRESS NOTES
Procedure   - Large Joint Arthrocentesis: L knee on 7/11/2024 1:26 PM  Indications: pain  Details: 21 G needle, anterolateral approach  Medications: 20 mg Sodium Hyaluronate (Viscosup) 20 MG/2ML  Outcome: tolerated well, no immediate complications  Procedure, treatment alternatives, risks and benefits explained, specific risks discussed. Consent was given by the patient. Immediately prior to procedure a time out was called to verify the correct patient, procedure, equipment, support staff and site/side marked as required. Patient was prepped and draped in the usual sterile fashion.

## 2024-07-11 NOTE — PROGRESS NOTES
CC: Follow-up left knee osteoarthritis, 1/3 Euflexxa related injection today    History of present illness: Patient presents for her first Euflexxa injection to the left knee today.  At this time the patient denies any numbness or tingling into the distal extremity.  No fever, chills, night sweats or other constitutional symptoms.    See chart for PMH, PSH, Meds, All - reviewed.    Ortho exam:  Left knee  Skin: Intact without redness, warmth.  Trace effusion.  Patient also has erythema noted to the lower leg with some mild soft tissue swelling.    Tenderness: Moderate medial joint line tenderness.  Motor/sensory: Grossly intact L2-S1.    Assessment/plan:  Left knee osteoarthritis    Proceed today with 1/3 of Euflexxa injection.  Patient will follow-up in 1 week for second injection.      After discussing risks of injection the patient gave consent to proceed.  Her left knee was confirmed as the correct joint to be injected with a timeout.  The knee was then prepped with Hibiclens and injected with a prefilled syringe of Euflexxa without any resistance using anterior lateral approach, patient in seated position.  The patient tolerated procedure well.  Hemostasis was achieved and a Band-Aid was applied over the injection site.  I instructed the patient on signs and symptoms of infection.  They should report to the ED if any of these develop.  Recommended modifying activity to include rest, ice, elevation and/or heat along with oral pain medication as needed.

## 2024-07-18 ENCOUNTER — CLINICAL SUPPORT (OUTPATIENT)
Dept: ORTHOPEDIC SURGERY | Facility: CLINIC | Age: 69
End: 2024-07-18
Payer: COMMERCIAL

## 2024-07-18 DIAGNOSIS — K21.9 GASTROESOPHAGEAL REFLUX DISEASE WITHOUT ESOPHAGITIS: ICD-10-CM

## 2024-07-18 DIAGNOSIS — I27.82 CHRONIC PULMONARY EMBOLISM, UNSPECIFIED PULMONARY EMBOLISM TYPE, UNSPECIFIED WHETHER ACUTE COR PULMONALE PRESENT: ICD-10-CM

## 2024-07-18 DIAGNOSIS — M17.12 PRIMARY OSTEOARTHRITIS OF LEFT KNEE: Primary | ICD-10-CM

## 2024-07-18 RX ORDER — OMEPRAZOLE 20 MG/1
20 CAPSULE, DELAYED RELEASE ORAL DAILY PRN
Qty: 30 CAPSULE | Refills: 0 | Status: SHIPPED | OUTPATIENT
Start: 2024-07-18

## 2024-07-18 RX ORDER — HYALURONATE SODIUM 10 MG/ML
20 SYRINGE (ML) INTRAARTICULAR
Status: COMPLETED | OUTPATIENT
Start: 2024-07-18 | End: 2024-07-18

## 2024-07-18 RX ORDER — BUSPIRONE HYDROCHLORIDE 7.5 MG/1
7.5 TABLET ORAL 3 TIMES DAILY
Qty: 270 TABLET | Refills: 1 | Status: SHIPPED | OUTPATIENT
Start: 2024-07-18

## 2024-07-18 RX ADMIN — Medication 20 MG: at 11:07

## 2024-07-18 NOTE — TELEPHONE ENCOUNTER
Caller: Sheree Samuel    Relationship: Self    Best call back number:[phone]     Requested Prescriptions:   Requested Prescriptions     Pending Prescriptions Disp Refills    apixaban (Eliquis) 2.5 MG tablet tablet 180 tablet 1     Sig: Take 1 tablet by mouth Every 12 (Twelve) Hours.    omeprazole (priLOSEC) 20 MG capsule 30 capsule 0     Sig: Take 1 capsule by mouth Daily As Needed (For GERD).    busPIRone (BUSPAR) 7.5 MG tablet 270 tablet 1     Sig: Take 1 tablet by mouth 3 times a day.        Pharmacy where request should be sent: [facility]     Last office visit with prescribing clinician: 6/4/2024   Last telemedicine visit with prescribing clinician: 5/14/2024   Next office visit with prescribing clinician: 9/5/2024     Additional details provided by patient: PATIENT IS OUT    Does the patient have less than a 3 day supply:  [x] Yes  [] No    Would you like a call back once the refill request has been completed: [] Yes [x] No    If the office needs to give you a call back, can they leave a voicemail: [] Yes [x] No    Rosey Brice   07/18/24 09:24 EDT

## 2024-07-18 NOTE — PROGRESS NOTES
CC: Follow-up left knee osteoarthritis, 2/3 Euflexxa related injection today     History of present illness: Patient presents for her second Euflexxa injection to the left knee today.  At this time the patient denies any numbness or tingling into the distal extremity.  No fever, chills, night sweats or other constitutional symptoms.    Slight improvement.     See chart for PMH, PSH, Meds, All - reviewed.     Ortho exam:  Left knee  Skin: Intact without redness, warmth.  Trace effusion.      Tenderness: Moderate medial joint line tenderness.  Motor/sensory: Grossly intact L2-S1.     Assessment/plan:  Left knee osteoarthritis     Proceed today with 2/3 of Euflexxa injection.  Patient will follow-up in 1 week for third injection.       After discussing risks of injection the patient gave consent to proceed.  Her left knee was confirmed as the correct joint to be injected with a timeout.  The knee was then prepped with Hibiclens and injected with a prefilled syringe of Euflexxa without any resistance using anterior lateral approach, patient in seated position.  The patient tolerated procedure well.  Hemostasis was achieved and a Band-Aid was applied over the injection site.  I instructed the patient on signs and symptoms of infection.  They should report to the ED if any of these develop.  Recommended modifying activity to include rest, ice, elevation and/or heat along with oral pain medication as needed.

## 2024-07-18 NOTE — PROGRESS NOTES
Procedure   Large Joint Arthrocentesis: L knee  Date/Time: 7/18/2024 11:07 AM  Consent given by: patient  Site marked: site marked  Timeout: Immediately prior to procedure a time out was called to verify the correct patient, procedure, equipment, support staff and site/side marked as required   Supporting Documentation  Indications: pain   Procedure Details  Location: knee - L knee  Preparation: Patient was prepped and draped in the usual sterile fashion  Needle gauge: 21g.  Approach: anterolateral  Medications administered: 20 mg Sodium Hyaluronate (Viscosup) 20 MG/2ML  Patient tolerance: patient tolerated the procedure well with no immediate complications

## 2024-07-22 ENCOUNTER — CLINICAL SUPPORT (OUTPATIENT)
Dept: INTERNAL MEDICINE | Facility: CLINIC | Age: 69
End: 2024-07-22
Payer: COMMERCIAL

## 2024-07-22 DIAGNOSIS — E53.8 B12 DEFICIENCY: Primary | ICD-10-CM

## 2024-07-22 PROCEDURE — 96372 THER/PROPH/DIAG INJ SC/IM: CPT

## 2024-07-22 RX ADMIN — CYANOCOBALAMIN 1000 MCG: 1000 INJECTION, SOLUTION INTRAMUSCULAR; SUBCUTANEOUS at 13:05

## 2024-07-25 ENCOUNTER — CLINICAL SUPPORT (OUTPATIENT)
Dept: ORTHOPEDIC SURGERY | Facility: CLINIC | Age: 69
End: 2024-07-25
Payer: COMMERCIAL

## 2024-07-25 DIAGNOSIS — G62.9 NEUROPATHY: ICD-10-CM

## 2024-07-25 DIAGNOSIS — M17.12 PRIMARY OSTEOARTHRITIS OF LEFT KNEE: Primary | ICD-10-CM

## 2024-07-25 RX ORDER — GABAPENTIN 400 MG/1
400 CAPSULE ORAL 3 TIMES DAILY
Qty: 90 CAPSULE | Refills: 0 | Status: SHIPPED | OUTPATIENT
Start: 2024-07-25

## 2024-07-25 RX ORDER — HYALURONATE SODIUM 10 MG/ML
20 SYRINGE (ML) INTRAARTICULAR
Status: COMPLETED | OUTPATIENT
Start: 2024-07-25 | End: 2024-07-25

## 2024-07-25 RX ORDER — GABAPENTIN 400 MG/1
400 CAPSULE ORAL 3 TIMES DAILY
Qty: 90 CAPSULE | Refills: 0 | Status: CANCELLED | OUTPATIENT
Start: 2024-07-25

## 2024-07-25 RX ADMIN — Medication 20 MG: at 13:30

## 2024-07-25 NOTE — PROGRESS NOTES
Procedure   Large Joint Arthrocentesis: L knee  Date/Time: 7/25/2024 1:30 PM  Consent given by: patient  Site marked: site marked  Timeout: Immediately prior to procedure a time out was called to verify the correct patient, procedure, equipment, support staff and site/side marked as required   Supporting Documentation  Indications: pain   Procedure Details  Location: knee - L knee  Preparation: Patient was prepped and draped in the usual sterile fashion  Needle gauge: 21g.  Approach: anterolateral  Medications administered: 20 mg Sodium Hyaluronate (Viscosup) 20 MG/2ML  Patient tolerance: patient tolerated the procedure well with no immediate complications

## 2024-07-25 NOTE — PROGRESS NOTES
CC: Follow-up left knee osteoarthritis, 3/3 Euflexxa related injection today     History of present illness: Patient presents for her third Euflexxa injection to the left knee today.  At this time the patient denies any numbness or tingling into the distal extremity.  No fever, chills, night sweats or other constitutional symptoms.     Slight improvement.     See chart for PMH, PSH, Meds, All - reviewed.     Ortho exam:  Left knee  Skin: Intact without redness, warmth.  Trace effusion.      Tenderness: Moderate medial joint line tenderness.  Motor/sensory: Grossly intact L2-S1.     Assessment/plan:  Left knee osteoarthritis     Proceed today with 3/3 of Euflexxa injection.  Patient will follow-up in 3 months.  Patient also has an appoint with her pain management the next day or today recommend she discuss possible genicular blocks, RFA.       After discussing risks of injection the patient gave consent to proceed.  Her left knee was confirmed as the correct joint to be injected with a timeout.  The knee was then prepped with Hibiclens and injected with a prefilled syringe of Euflexxa without any resistance using anterior lateral approach, patient in seated position.  The patient tolerated procedure well.  Hemostasis was achieved and a Band-Aid was applied over the injection site.  I instructed the patient on signs and symptoms of infection.  They should report to the ED if any of these develop.  Recommended modifying activity to include rest, ice, elevation and/or heat along with oral pain medication as needed.

## 2024-07-25 NOTE — TELEPHONE ENCOUNTER
Caller: Sheree Samuel    Relationship: Self    Best call back number: [phone]     Requested Prescriptions:   Requested Prescriptions     Pending Prescriptions Disp Refills    gabapentin (NEURONTIN) 400 MG capsule 90 capsule 0     Sig: Take 1 capsule by mouth 3 (Three) Times a Day.        Pharmacy where request should be sent: [facility]     Last office visit with prescribing clinician: 6/4/2024   Last telemedicine visit with prescribing clinician: 5/14/2024   Next office visit with prescribing clinician: 9/5/2024     Additional details provided by patient: PATIENT HAS 2 DAYS LEFT.     Does the patient have less than a 3 day supply:  [x] Yes  [] No    Would you like a call back once the refill request has been completed: [] Yes [x] No    If the office needs to give you a call back, can they leave a voicemail: [] Yes [x] No    Cadance Dunaway, RegSched Rep   07/25/24 11:23 EDT

## 2024-07-30 ENCOUNTER — CLINICAL SUPPORT (OUTPATIENT)
Dept: INTERNAL MEDICINE | Facility: CLINIC | Age: 69
End: 2024-07-30
Payer: COMMERCIAL

## 2024-07-30 DIAGNOSIS — E53.8 B12 DEFICIENCY: Primary | ICD-10-CM

## 2024-07-30 PROCEDURE — 96372 THER/PROPH/DIAG INJ SC/IM: CPT

## 2024-07-30 RX ADMIN — CYANOCOBALAMIN 1000 MCG: 1000 INJECTION, SOLUTION INTRAMUSCULAR; SUBCUTANEOUS at 12:46

## 2024-08-15 DIAGNOSIS — K21.9 GASTROESOPHAGEAL REFLUX DISEASE WITHOUT ESOPHAGITIS: ICD-10-CM

## 2024-08-15 RX ORDER — OMEPRAZOLE 20 MG/1
20 CAPSULE, DELAYED RELEASE ORAL DAILY PRN
Qty: 30 CAPSULE | Refills: 0 | Status: SHIPPED | OUTPATIENT
Start: 2024-08-15

## 2024-08-15 NOTE — TELEPHONE ENCOUNTER
Caller: Sheree Samuel    Relationship: Self    Best call back number: [phone]     Requested Prescriptions:   Requested Prescriptions     Pending Prescriptions Disp Refills    omeprazole (priLOSEC) 20 MG capsule 30 capsule 0     Sig: Take 1 capsule by mouth Daily As Needed (For GERD).        Pharmacy where request should be sent: [facility]     Last office visit with prescribing clinician: 6/4/2024   Last telemedicine visit with prescribing clinician: 5/14/2024   Next office visit with prescribing clinician: 9/5/2024     Additional details provided by patient: PATIENT IS OUT    Does the patient have less than a 3 day supply:  [x] Yes  [] No    Would you like a call back once the refill request has been completed: [] Yes [x] No    If the office needs to give you a call back, can they leave a voicemail: [] Yes [x] No    Rosey Brice Rep   08/15/24 11:12 EDT

## 2024-08-21 DIAGNOSIS — I27.82 CHRONIC PULMONARY EMBOLISM, UNSPECIFIED PULMONARY EMBOLISM TYPE, UNSPECIFIED WHETHER ACUTE COR PULMONALE PRESENT: ICD-10-CM

## 2024-08-21 RX ORDER — PIOGLITAZONEHYDROCHLORIDE 30 MG/1
30 TABLET ORAL DAILY
Qty: 90 TABLET | Refills: 1 | Status: SHIPPED | OUTPATIENT
Start: 2024-08-21

## 2024-08-21 NOTE — TELEPHONE ENCOUNTER
Caller: Sheree Samuel    Relationship: Self    Best call back number: [phone]    Requested Prescriptions:   Requested Prescriptions     Pending Prescriptions Disp Refills    apixaban (Eliquis) 2.5 MG tablet tablet 180 tablet 1     Sig: Take 1 tablet by mouth Every 12 (Twelve) Hours.    pioglitazone (ACTOS) 30 MG tablet 90 tablet 1     Sig: Take 1 tablet by mouth Daily.        Pharmacy where request should be sent: [facility]     Last office visit with prescribing clinician: 6/4/2024   Last telemedicine visit with prescribing clinician: 5/14/2024   Next office visit with prescribing clinician: 9/5/2024     Additional details provided by patient: PATIENT HAS LESS THAN 3 DAY SUPPLY AND IS MAIL DELIVERY FROM [facility].    Does the patient have less than a 3 day supply:  [x] Yes  [] No    Would you like a call back once the refill request has been completed: [] Yes [x] No    If the office needs to give you a call back, can they leave a voicemail: [] Yes [x] No    Rosey Leslie Rep   08/21/24 11:14 EDT

## 2024-08-27 LAB
NCCN CRITERIA FLAG: NORMAL
TYRER CUZICK SCORE: 3.7

## 2024-09-03 ENCOUNTER — HOSPITAL ENCOUNTER (OUTPATIENT)
Dept: BONE DENSITY | Facility: HOSPITAL | Age: 69
Discharge: HOME | End: 2024-09-03
Payer: COMMERCIAL

## 2024-09-03 ENCOUNTER — HOSPITAL ENCOUNTER (OUTPATIENT)
Dept: MAMMOGRAPHY | Facility: HOSPITAL | Age: 69
Discharge: HOME | End: 2024-09-03
Payer: COMMERCIAL

## 2024-09-03 DIAGNOSIS — Z78.0 ENCOUNTER FOR OSTEOPOROSIS SCREENING IN ASYMPTOMATIC POSTMENOPAUSAL PATIENT: ICD-10-CM

## 2024-09-03 DIAGNOSIS — Z13.820 ENCOUNTER FOR OSTEOPOROSIS SCREENING IN ASYMPTOMATIC POSTMENOPAUSAL PATIENT: ICD-10-CM

## 2024-09-03 DIAGNOSIS — Z12.31 BREAST CANCER SCREENING BY MAMMOGRAM: ICD-10-CM

## 2024-09-03 PROCEDURE — 77063 BREAST TOMOSYNTHESIS BI: CPT

## 2024-09-03 PROCEDURE — 77067 SCR MAMMO BI INCL CAD: CPT

## 2024-09-04 DIAGNOSIS — G62.9 NEUROPATHY: ICD-10-CM

## 2024-09-04 RX ORDER — GABAPENTIN 400 MG/1
400 CAPSULE ORAL 3 TIMES DAILY
Qty: 90 CAPSULE | Refills: 0 | Status: SHIPPED | OUTPATIENT
Start: 2024-09-04

## 2024-09-04 NOTE — TELEPHONE ENCOUNTER
Caller: Sheree Samuel    Relationship: Self    Best call back number: [phone]     Requested Prescriptions:   Requested Prescriptions     Pending Prescriptions Disp Refills    tiZANidine (ZANAFLEX) 4 MG tablet 90 tablet 1     Sig: Take 1 tablet by mouth 3 (Three) Times a Day As Needed for muscle spasms    gabapentin (NEURONTIN) 400 MG capsule 90 capsule 0     Sig: Take 1 capsule by mouth 3 (Three) Times a Day.        Pharmacy where request should be sent: [facility]     Last office visit with prescribing clinician: 6/4/2024   Last telemedicine visit with prescribing clinician: 5/14/2024   Next office visit with prescribing clinician: 9/5/2024     Additional details provided by patient: THIS MEDICATION IS TO BE MAILED OUT TO PATIENT.     Does the patient have less than a 3 day supply:  [x] Yes  [] No    Would you like a call back once the refill request has been completed: [] Yes [x] No    If the office needs to give you a call back, can they leave a voicemail: [] Yes [x] No    Rosey Mosher   09/04/24 13:28 EDT

## 2024-09-05 ENCOUNTER — TELEMEDICINE (OUTPATIENT)
Dept: INTERNAL MEDICINE | Facility: CLINIC | Age: 69
End: 2024-09-05
Payer: COMMERCIAL

## 2024-09-05 DIAGNOSIS — R09.81 NASAL CONGESTION: ICD-10-CM

## 2024-09-05 DIAGNOSIS — R19.7 DIARRHEA, UNSPECIFIED TYPE: ICD-10-CM

## 2024-09-05 DIAGNOSIS — R05.1 ACUTE COUGH: Primary | ICD-10-CM

## 2024-09-05 PROCEDURE — 99214 OFFICE O/P EST MOD 30 MIN: CPT

## 2024-09-05 RX ORDER — AZITHROMYCIN 250 MG/1
TABLET, FILM COATED ORAL
Qty: 6 TABLET | Refills: 0 | Status: SHIPPED | OUTPATIENT
Start: 2024-09-05
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
## 2024-10-07 ENCOUNTER — OFFICE VISIT (OUTPATIENT)
Dept: INTERNAL MEDICINE | Facility: CLINIC | Age: 69
End: 2024-10-07
Payer: COMMERCIAL

## 2024-10-07 VITALS
SYSTOLIC BLOOD PRESSURE: 118 MMHG | WEIGHT: 115 LBS | HEART RATE: 68 BPM | BODY MASS INDEX: 26.61 KG/M2 | TEMPERATURE: 98 F | DIASTOLIC BLOOD PRESSURE: 62 MMHG | HEIGHT: 55 IN

## 2024-10-07 DIAGNOSIS — R29.6 FREQUENT FALLS: ICD-10-CM

## 2024-10-07 DIAGNOSIS — F33.2 MAJOR DEPRESSIVE DISORDER, RECURRENT SEVERE WITHOUT PSYCHOTIC FEATURES: Primary | ICD-10-CM

## 2024-10-07 DIAGNOSIS — G89.29 CHRONIC PAIN OF LEFT KNEE: ICD-10-CM

## 2024-10-07 DIAGNOSIS — F41.9 ANXIETY: ICD-10-CM

## 2024-10-07 DIAGNOSIS — G89.4 CHRONIC PAIN DISORDER: ICD-10-CM

## 2024-10-07 DIAGNOSIS — F33.2 MAJOR DEPRESSIVE DISORDER, RECURRENT SEVERE WITHOUT PSYCHOTIC FEATURES: ICD-10-CM

## 2024-10-07 DIAGNOSIS — M25.562 CHRONIC PAIN OF LEFT KNEE: ICD-10-CM

## 2024-10-07 DIAGNOSIS — N39.41 URGE INCONTINENCE OF URINE: ICD-10-CM

## 2024-10-07 DIAGNOSIS — K21.9 GASTROESOPHAGEAL REFLUX DISEASE WITHOUT ESOPHAGITIS: ICD-10-CM

## 2024-10-07 DIAGNOSIS — G62.9 NEUROPATHY: ICD-10-CM

## 2024-10-07 PROCEDURE — 99214 OFFICE O/P EST MOD 30 MIN: CPT

## 2024-10-07 RX ORDER — GABAPENTIN 400 MG/1
400 CAPSULE ORAL 3 TIMES DAILY
Qty: 90 CAPSULE | Refills: 0 | Status: SHIPPED | OUTPATIENT
Start: 2024-10-07

## 2024-10-07 RX ORDER — SERTRALINE HYDROCHLORIDE 100 MG/1
200 TABLET, FILM COATED ORAL DAILY
Qty: 180 TABLET | Refills: 1 | Status: SHIPPED | OUTPATIENT
Start: 2024-10-07

## 2024-10-07 NOTE — TELEPHONE ENCOUNTER
Caller: Sheree Samuel    Relationship: Self    Best call back number: [phone]     Requested Prescriptions:   Requested Prescriptions     Pending Prescriptions Disp Refills    sertraline (ZOLOFT) 100 MG tablet 90 tablet 1     Sig: Take 2 tablets by mouth Daily.    omeprazole (priLOSEC) 20 MG capsule 30 capsule 0     Sig: Take 1 capsule by mouth Daily As Needed for GERD.    gabapentin (NEURONTIN) 400 MG capsule 90 capsule 0     Sig: Take 1 capsule by mouth 3 (Three) Times a Day.        Pharmacy where request should be sent: [facility]     Last office visit with prescribing clinician: 6/4/2024   Last telemedicine visit with prescribing clinician: 9/5/2024   Next office visit with prescribing clinician: 10/7/2024     Additional details provided by patient:     Does the patient have less than a 3 day supply:  [x] Yes  [] No    Would you like a call back once the refill request has been completed: [] Yes [x] No    If the office needs to give you a call back, can they leave a voicemail: [] Yes [x] No    Rosey Wyman Rep   10/07/24 09:53 EDT
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
## 2024-10-07 NOTE — TELEPHONE ENCOUNTER
Rx Refill Note  Requested Prescriptions     Pending Prescriptions Disp Refills    sertraline (ZOLOFT) 100 MG tablet 90 tablet 1     Sig: Take 2 tablets by mouth Daily.    omeprazole (priLOSEC) 20 MG capsule 30 capsule 0     Sig: Take 1 capsule by mouth Daily As Needed for GERD.    gabapentin (NEURONTIN) 400 MG capsule 90 capsule 0     Sig: Take 1 capsule by mouth 3 (Three) Times a Day.      Last office visit with prescribing clinician: 6/4/2024   Last telemedicine visit with prescribing clinician: 9/5/2024   Next office visit with prescribing clinician: 10/7/2024     LA: 09/4/24 #90 0R                        Would you like a call back once the refill request has been completed: [] Yes [] No    If the office needs to give you a call back, can they leave a voicemail: [] Yes [] No    Alejandra Mendoza LPN  10/07/24, 10:10 EDT

## 2024-10-08 PROBLEM — M25.562 CHRONIC PAIN OF LEFT KNEE: Status: ACTIVE | Noted: 2024-10-08

## 2024-10-08 PROBLEM — G89.29 CHRONIC PAIN OF LEFT KNEE: Status: ACTIVE | Noted: 2024-10-08

## 2024-10-08 PROBLEM — F41.9 ANXIETY: Status: ACTIVE | Noted: 2024-10-08

## 2024-10-08 PROBLEM — F32.A DEPRESSIVE DISORDER: Status: RESOLVED | Noted: 2018-04-25 | Resolved: 2024-10-08
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
## 2024-10-08 PROBLEM — R29.6 FREQUENT FALLS: Status: ACTIVE | Noted: 2024-10-08

## 2024-10-08 PROBLEM — F32.9 MAJOR DEPRESSIVE DISORDER, SINGLE EPISODE, UNSPECIFIED: Status: RESOLVED | Noted: 2018-10-29 | Resolved: 2024-10-08

## 2024-10-08 PROBLEM — U07.1 COVID-19 VIRUS INFECTION: Status: RESOLVED | Noted: 2023-12-26 | Resolved: 2024-10-08

## 2024-10-29 ENCOUNTER — OFFICE VISIT (OUTPATIENT)
Dept: INTERNAL MEDICINE | Facility: CLINIC | Age: 69
End: 2024-10-29
Payer: COMMERCIAL

## 2024-10-29 ENCOUNTER — OFFICE VISIT (OUTPATIENT)
Dept: ORTHOPEDIC SURGERY | Facility: CLINIC | Age: 69
End: 2024-10-29
Payer: COMMERCIAL

## 2024-10-29 VITALS
WEIGHT: 115 LBS | SYSTOLIC BLOOD PRESSURE: 124 MMHG | DIASTOLIC BLOOD PRESSURE: 62 MMHG | BODY MASS INDEX: 26.61 KG/M2 | TEMPERATURE: 98.9 F | HEIGHT: 55 IN | HEART RATE: 72 BPM

## 2024-10-29 VITALS
BODY MASS INDEX: 26.61 KG/M2 | WEIGHT: 115 LBS | DIASTOLIC BLOOD PRESSURE: 68 MMHG | SYSTOLIC BLOOD PRESSURE: 122 MMHG | HEIGHT: 55 IN

## 2024-10-29 DIAGNOSIS — F33.2 MAJOR DEPRESSIVE DISORDER, RECURRENT SEVERE WITHOUT PSYCHOTIC FEATURES: ICD-10-CM

## 2024-10-29 DIAGNOSIS — H50.34 ALTERNATING INTERMITTENT EXOTROPIA: ICD-10-CM

## 2024-10-29 DIAGNOSIS — G89.29 CHRONIC PAIN OF LEFT KNEE: ICD-10-CM

## 2024-10-29 DIAGNOSIS — M17.12 PRIMARY OSTEOARTHRITIS OF LEFT KNEE: Primary | ICD-10-CM

## 2024-10-29 DIAGNOSIS — Z79.01 LONG TERM CURRENT USE OF ANTICOAGULANT: ICD-10-CM

## 2024-10-29 DIAGNOSIS — M25.562 CHRONIC PAIN OF LEFT KNEE: ICD-10-CM

## 2024-10-29 DIAGNOSIS — Z12.11 COLON CANCER SCREENING: ICD-10-CM

## 2024-10-29 DIAGNOSIS — I10 ESSENTIAL (PRIMARY) HYPERTENSION: ICD-10-CM

## 2024-10-29 DIAGNOSIS — L84 CORN OR CALLUS: ICD-10-CM

## 2024-10-29 DIAGNOSIS — E11.9 TYPE 2 DIABETES MELLITUS WITHOUT COMPLICATION, WITHOUT LONG-TERM CURRENT USE OF INSULIN: Primary | ICD-10-CM

## 2024-10-29 DIAGNOSIS — F41.1 GENERALIZED ANXIETY DISORDER: ICD-10-CM

## 2024-10-29 PROBLEM — F41.9 ANXIETY: Status: RESOLVED | Noted: 2024-10-08 | Resolved: 2024-10-29

## 2024-10-29 PROCEDURE — 90472 IMMUNIZATION ADMIN EACH ADD: CPT

## 2024-10-29 PROCEDURE — 90471 IMMUNIZATION ADMIN: CPT

## 2024-10-29 PROCEDURE — 90662 IIV NO PRSV INCREASED AG IM: CPT

## 2024-10-29 PROCEDURE — 99214 OFFICE O/P EST MOD 30 MIN: CPT

## 2024-10-29 PROCEDURE — 90677 PCV20 VACCINE IM: CPT
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
## 2024-10-29 NOTE — LETTER
[facility]  Vaccine Consent Form    Patient Name:  Sheree Samuel  Patient :  1955  MRN: [id number]     Vaccine(s) Ordered    Pneumococcal Conjugate Vaccine 20-Valent All  Fluzone High-Dose 65+yrs        Screening Checklist  The following questions should be completed prior to vaccination. If you answer “yes” to any question, it does not necessarily mean you should not be vaccinated. It just means we may need to clarify or ask more questions. If a question is unclear, please ask your healthcare provider to explain it.    Yes No   Any fever or moderate to severe illness today (mild illness and/or antibiotic treatment are not contraindications)?     Do you have a history of a serious reaction to any previous vaccinations, such as anaphylaxis, encephalopathy within 7 days, Guillain-Fargo syndrome within 6 weeks, seizure?     Have you received any live vaccine(s) (e.g MMR, CHRISTOPHER) or any other vaccines in the last month (to ensure duplicate doses aren't given)?     Do you have an anaphylactic allergy to latex (DTaP, DTaP-IPV, Hep A, Hep B, MenB, RV, Td, Tdap), baker’s yeast (Hep B, HPV), polysorbates (RSV, nirsevimab, PCV 20, Rotavirrus, Tdap, Shingrix), or gelatin (CHRISTOPHER, MMR)?     Do you have an anaphylactic allergy to neomycin (Rabies, CHRISTOPHER, MMR, IPV, Hep A), polymyxin B (IPV), or streptomycin (IPV)?      Any cancer, leukemia, AIDS, or other immune system disorder? (CHRISTOPHER, MMR, RV)     Do you have a parent, brother, or sister with an immune system problem (if immune competence of vaccine recipient clinically verified, can proceed)? (MMR, CHRISTOPHER)     Any recent steroid treatments for >2 weeks, chemotherapy, or radiation treatment? (CHRISTOPHER, MMR)     Have you received antibody-containing blood transfusions or IVIG in the past 11 months (recommended interval is dependent on product)? (MMR, CHRISTOPHER)     Have you taken antiviral drugs (acyclovir, famciclovir, valacyclovir for CHRISTOPHER) in the last 24 or 48 hours, respectively?     "  Are you pregnant or planning to become pregnant within 1 month? (CHRISTOPHER, MMR, HPV, IPV, MenB, Abrexvy; For Hep B- refer to Engerix-B; For RSV - Abrysvo is indicated for 32-36 weeks of pregnancy from September to January)     For infants, have you ever been told your child has had intussusception or a medical emergency involving obstruction of the intestine (Rotavirus)? If not for an infant, can skip this question.         *Ordering Physicians/APC should be consulted if \"yes\" is checked by the patient or guardian above.  I have received, read, and understand the Vaccine Information Statement (VIS) for each vaccine ordered.  I have considered my or my child's health status as well as the health status of my close contacts.  I have taken the opportunity to discuss my vaccine questions with my or my child's health care provider.   I have requested that the ordered vaccine(s) be given to me or my child.  I understand the benefits and risks of the vaccines.  I understand that I should remain in the clinic for 15 minutes after receiving the vaccine(s).  _________________________________________________________  Signature of Patient or Parent/Legal Guardian ____________________  Date     "

## 2024-10-31 RX ORDER — PRAMIPEXOLE DIHYDROCHLORIDE 0.12 MG/1
0.12 TABLET ORAL 3 TIMES DAILY
Qty: 270 TABLET | Refills: 1 | Status: SHIPPED | OUTPATIENT
Start: 2024-10-31

## 2024-10-31 RX ORDER — ROSUVASTATIN CALCIUM 20 MG/1
20 TABLET, COATED ORAL DAILY
Qty: 90 TABLET | Refills: 1 | Status: SHIPPED | OUTPATIENT
Start: 2024-10-31

## 2024-10-31 NOTE — TELEPHONE ENCOUNTER
Caller: Sheree Samuel    Relationship: Self    Best call back number: [phone]     Requested Prescriptions:   Requested Prescriptions     Pending Prescriptions Disp Refills    pramipexole (MIRAPEX) 0.125 MG tablet 270 tablet 1     Sig: Take 1 tablet by mouth 3 times a day.    rosuvastatin (CRESTOR) 20 MG tablet 90 tablet 1     Sig: Take 1 tablet by mouth Daily.        Pharmacy where request should be sent: [facility]     Last office visit with prescribing clinician: 10/29/2024   Last telemedicine visit with prescribing clinician: 9/5/2024   Next office visit with prescribing clinician: 1/30/2025     Additional details provided by patient: PATIENT HAS 2 DAYS LEFT.     Does the patient have less than a 3 day supply:  [x] Yes  [] No    Would you like a call back once the refill request has been completed: [] Yes [x] No    If the office needs to give you a call back, can they leave a voicemail: [] Yes [x] No    Cadance Dunaway, RegSched Rep   10/31/24 14:06 EDT

## 2024-11-01 ENCOUNTER — TELEPHONE (OUTPATIENT)
Dept: GASTROENTEROLOGY | Facility: CLINIC | Age: 69
End: 2024-11-01
Payer: COMMERCIAL

## 2024-11-01 NOTE — TELEPHONE ENCOUNTER
November 1, 2024 [address] [facility], [place] [address]  Phone: [phone]  Fax: [phone]                 Sheree Samuel  [address]  [facility] [address]  1955        Patient will be having a a colonoscopy by Dr. Alejandra Cantrell  on 01/15/2024 . The patient is needing  clearance due to being on blood thinners. Please complete the following and fax back to the office.     Patient's was last seen in the office on:_____________________    Procedure/Test Performed:    _____ Stress Test       _____ Echocardiogram    _____ EKG     _____ Heart Cath    Based on the above test results and/or clinical evaluation it is my recommendation:    ____ Patient may proceed with the scheduled surgical procedure with an acceptable cardiac risk.    ____ Patient CAN NOT stop Plavix, Effient, Ticlid, Aspirin, Coumadin, Pradaxa, Xarelto, Eliquis, Savaysa, or Brilinta prior to procedure.     ____ Patient CAN stop Plavix, Effient, Ticlid, Aspirin, Coumadin, Pradaxa, Xarelto, Eliquis, Savaysa, or Brilinta  ______ days prior to procedure.    Please sign and date below.    Signature________________________________________   Date:_________________     Thank You    Mark I. Brunner, M.D.  BRIAN Henao M.D. V. Alex Howard, M.D. Amy C. Tiu, M.D. Gregory M. Woolfolk, M.D. Laura Travis, APRN Stephanie Miller, PA Kyle Bloomfield, APRN Katie Cecil, PA

## 2024-11-01 NOTE — TELEPHONE ENCOUNTER
PT SCHED FOR COLON W/ DR MATTA 1/15, TAKES ELIQUIS BLOOD THINNER, NEEDS CARDIAC CLEARANCE. PRESCRIBED BY:    Delilah Becker, APRN  [address] [address]  Phone: [phone]   Fax: [phone]

## 2024-11-04 ENCOUNTER — TELEPHONE (OUTPATIENT)
Dept: INTERNAL MEDICINE | Facility: CLINIC | Age: 69
End: 2024-11-04
Payer: COMMERCIAL

## 2024-11-04 NOTE — TELEPHONE ENCOUNTER
Please call Ms. Samuel and let her know that there is very little that can be done about broken fingers or toes besides splinting to the next toe.  She is more than welcome to schedule an appointment and we can look into other needs such as physical therapy if needed.

## 2024-11-04 NOTE — TELEPHONE ENCOUNTER
PATIENT STATES SHE BROKE SOME TOES OVER THE WEEKEND PUSHING A EMPTY WHEELCHAIR AND IT GOT AWAY FROM HER AND THAT'S WHEN SHE SAID SHE BROKE HER TOES. SHE IS ASKING ADVISE ON WHAT SHE NEEDS TO DO.  SHE IS REQUESTING THAT ROSA FLORIAN CALL HER.

## 2024-11-06 DIAGNOSIS — I10 ESSENTIAL (PRIMARY) HYPERTENSION: ICD-10-CM

## 2024-11-06 DIAGNOSIS — G62.9 NEUROPATHY: ICD-10-CM

## 2024-11-06 RX ORDER — AMLODIPINE BESYLATE 5 MG/1
5 TABLET ORAL DAILY
Qty: 90 TABLET | Refills: 1 | Status: SHIPPED | OUTPATIENT
Start: 2024-11-06

## 2024-11-06 RX ORDER — PIOGLITAZONEHYDROCHLORIDE 30 MG/1
30 TABLET ORAL DAILY
Qty: 90 TABLET | Refills: 1 | Status: SHIPPED | OUTPATIENT
Start: 2024-11-06

## 2024-11-07 RX ORDER — GABAPENTIN 400 MG/1
400 CAPSULE ORAL 3 TIMES DAILY
Qty: 90 CAPSULE | Refills: 0 | Status: SHIPPED | OUTPATIENT
Start: 2024-11-07

## 2024-11-11 ENCOUNTER — OFFICE VISIT (OUTPATIENT)
Dept: UROLOGY | Facility: CLINIC | Age: 69
End: 2024-11-11
Payer: COMMERCIAL

## 2024-11-11 VITALS — BODY MASS INDEX: 26.61 KG/M2 | WEIGHT: 115 LBS | HEIGHT: 55 IN

## 2024-11-11 DIAGNOSIS — N39.41 URGE INCONTINENCE: Primary | ICD-10-CM

## 2024-11-11 DIAGNOSIS — I27.82 CHRONIC PULMONARY EMBOLISM, UNSPECIFIED PULMONARY EMBOLISM TYPE, UNSPECIFIED WHETHER ACUTE COR PULMONALE PRESENT: ICD-10-CM

## 2024-11-11 LAB
BILIRUB BLD-MCNC: NEGATIVE MG/DL
CLARITY, POC: CLEAR
COLOR UR: YELLOW
EXPIRATION DATE: NORMAL
GLUCOSE UR STRIP-MCNC: NEGATIVE MG/DL
KETONES UR QL: NEGATIVE
LEUKOCYTE EST, POC: NEGATIVE
Lab: NORMAL
NITRITE UR-MCNC: NEGATIVE MG/ML
PH UR: 6 [PH] (ref 5–8)
PROT UR STRIP-MCNC: NEGATIVE MG/DL
RBC # UR STRIP: NEGATIVE /UL
SP GR UR: 1.01 (ref 1–1.03)
UROBILINOGEN UR QL: NORMAL

## 2024-11-11 PROCEDURE — 99204 OFFICE O/P NEW MOD 45 MIN: CPT | Performed by: UROLOGY

## 2024-11-11 PROCEDURE — 81003 URINALYSIS AUTO W/O SCOPE: CPT | Performed by: UROLOGY

## 2024-11-11 NOTE — TELEPHONE ENCOUNTER
Caller: Sheree Samuel    Relationship: Self    Best call back number:       [phone] (Mobile)     Requested Prescriptions:   Requested Prescriptions     Pending Prescriptions Disp Refills    apixaban (Eliquis) 2.5 MG tablet tablet 180 tablet 1     Sig: Take 1 tablet by mouth Every 12 (Twelve) Hours.        traZODone (DESYREL) 100 MG tablet     Pharmacy where request should be sent:    [facility]     Last office visit with prescribing clinician: 10/29/2024   Last telemedicine visit with prescribing clinician: 9/5/2024   Next office visit with prescribing clinician: 1/30/2025     Additional details provided by patient:     PATIENT STATED SHE HAS AT LEAST 3 DAY SUPPLY     PATIENT ALSO STATED SHE WILL [place]AV[place] WEDNESDAY, 11[place]13 AND WILL NEED THE MEDICATIONS PRIOR TO LEAVING    PATIENT ALSO REQUESTED THE MEDICATIONS BE DELIVERED    Does the patient have less than a 3 day supply:  [] Yes  [x] No    Would you like a call back once the refill request has been completed: [] Yes [] No    If the office needs to give you a call back, can they leave a voicemail: [] Yes [] No    Rosey Joseph Rep   11/11/24 09:57 EST
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
## 2024-11-15 ENCOUNTER — PATIENT ROUNDING (BHMG ONLY) (OUTPATIENT)
Dept: UROLOGY | Facility: CLINIC | Age: 69
End: 2024-11-15
Payer: COMMERCIAL

## 2024-11-15 NOTE — PROGRESS NOTES
A [employer] message has been sent to the patient for PATIENT ROUNDING with [facility].

## 2024-11-19 RX ORDER — TRAZODONE HYDROCHLORIDE 100 MG/1
200 TABLET ORAL NIGHTLY
Qty: 180 TABLET | Refills: 1 | Status: SHIPPED | OUTPATIENT
Start: 2024-11-19

## 2024-12-03 DIAGNOSIS — I10 ESSENTIAL (PRIMARY) HYPERTENSION: ICD-10-CM

## 2024-12-03 DIAGNOSIS — G62.9 NEUROPATHY: ICD-10-CM

## 2024-12-03 RX ORDER — GABAPENTIN 400 MG/1
400 CAPSULE ORAL 3 TIMES DAILY
Qty: 90 CAPSULE | Refills: 0 | Status: SHIPPED | OUTPATIENT
Start: 2024-12-03

## 2024-12-03 RX ORDER — METOPROLOL TARTRATE 25 MG/1
25 TABLET, FILM COATED ORAL DAILY
Start: 2024-12-03

## 2024-12-03 NOTE — TELEPHONE ENCOUNTER
Rx Refill Note  Requested Prescriptions     Pending Prescriptions Disp Refills    tiZANidine (ZANAFLEX) 4 MG tablet 90 tablet 1     Sig: Take 1 tablet by mouth up to 3 Times a Day As Needed for muscle spasms    gabapentin (NEURONTIN) 400 MG capsule 90 capsule 0     Sig: Take 1 capsule by mouth 3 (Three) Times a Day.    metoprolol tartrate (LOPRESSOR) 25 MG tablet       Sig: Take 1 tablet by mouth Daily.      Last office visit with prescribing clinician: 10/29/2024   Last telemedicine visit with prescribing clinician: 9/5/2024   Next office visit with prescribing clinician: 1/30/2025                         Would you like a call back once the refill request has been completed: [] Yes [] No    If the office needs to give you a call back, can they leave a voicemail: [] Yes [] No    Rachel Cruz MA  12/03/24, 12:50 EST

## 2024-12-03 NOTE — TELEPHONE ENCOUNTER
Caller: Sheree Samuel    Relationship: Self    Best call back number: [phone]     Requested Prescriptions:   Requested Prescriptions     Pending Prescriptions Disp Refills    tiZANidine (ZANAFLEX) 4 MG tablet 90 tablet 1     Sig: Take 1 tablet by mouth up to 3 Times a Day As Needed for muscle spasms    gabapentin (NEURONTIN) 400 MG capsule 90 capsule 0     Sig: Take 1 capsule by mouth 3 (Three) Times a Day.    metoprolol tartrate (LOPRESSOR) 25 MG tablet       Sig: Take 1 tablet by mouth Daily.        [facility]     Last office visit with prescribing clinician: 10/29/2024   Last telemedicine visit with prescribing clinician: 9/5/2024   Next office visit with prescribing clinician: 1/30/2025     Does the patient have less than a 3 day supply:  [] Yes  [x] No    Would you like a call back once the refill request has been completed: [] Yes [] No    If the office needs to give you a call back, can they leave a voicemail: [] Yes [] No    Rosey Tyler Rep   12/03/24 12:47 EST

## 2024-12-26 DIAGNOSIS — K21.9 GASTROESOPHAGEAL REFLUX DISEASE WITHOUT ESOPHAGITIS: ICD-10-CM

## 2024-12-26 NOTE — TELEPHONE ENCOUNTER
Caller: Sheree Samuel    Relationship: Self    Best call back number: [phone]     Requested Prescriptions:   Requested Prescriptions     Pending Prescriptions Disp Refills    omeprazole (priLOSEC) 20 MG capsule 90 capsule 0     Sig: Take 1 capsule by mouth Daily As Needed for GERD.        Pharmacy where request should be sent: [facility]     Last office visit with prescribing clinician: 10/29/2024   Last telemedicine visit with prescribing clinician: 9/5/2024   Next office visit with prescribing clinician: 1/30/2025     Additional details provided by patient:     Does the patient have less than a 3 day supply:  [] Yes  [x] No    Would you like a call back once the refill request has been completed: [] Yes [x] No    If the office needs to give you a call back, can they leave a voicemail: [] Yes [x] No    Rosey Narvaez Rep   12/26/24 12:01 EST

## 2024-12-26 NOTE — TELEPHONE ENCOUNTER
Rx Refill Note  Requested Prescriptions     Pending Prescriptions Disp Refills    omeprazole (priLOSEC) 20 MG capsule 90 capsule 0     Sig: Take 1 capsule by mouth Daily As Needed for GERD.      Last office visit with prescribing clinician: 10/29/2024   Last telemedicine visit with prescribing clinician: 9/5/2024   Next office visit with prescribing clinician: 1/30/2025                         Would you like a call back once the refill request has been completed: [] Yes [] No    If the office needs to give you a call back, can they leave a voicemail: [] Yes [] No    Rachel Cruz MA  12/26/24, 13:16 EST

## 2024-12-31 RX ORDER — SODIUM, POTASSIUM,MAG SULFATES 17.5-3.13G
1 SOLUTION, RECONSTITUTED, ORAL ORAL TAKE AS DIRECTED
Qty: 354 ML | Refills: 0 | Status: SHIPPED | OUTPATIENT
Start: 2024-12-31

## 2025-01-09 DIAGNOSIS — G62.9 NEUROPATHY: ICD-10-CM

## 2025-01-09 RX ORDER — GABAPENTIN 400 MG/1
400 CAPSULE ORAL 3 TIMES DAILY
Qty: 90 CAPSULE | Refills: 0 | Status: SHIPPED | OUTPATIENT
Start: 2025-01-09

## 2025-01-09 NOTE — TELEPHONE ENCOUNTER
Caller: Sheree Samuel    Relationship: Self    Best call back number: [phone]     Requested Prescriptions:   Requested Prescriptions     Pending Prescriptions Disp Refills    gabapentin (NEURONTIN) 400 MG capsule 90 capsule 0     Sig: Take 1 capsule by mouth 3 (Three) Times a Day.        Pharmacy where request should be sent: [facility]     Last office visit with prescribing clinician: 10/29/2024   Last telemedicine visit with prescribing clinician: 9/5/2024   Next office visit with prescribing clinician: 1/30/2025     Additional details provided by patient: PATIENT HAS CALLED REQUSTING A REFILL ON ABOVE MEDICATION.     Does the patient have less than a 3 day supply:  [] Yes  [x] No    Would you like a call back once the refill request has been completed: [] Yes [x] No    If the office needs to give you a call back, can they leave a voicemail: [] Yes [x] No    Monica Palmer   01/09/25 10:23 EST

## 2025-01-09 NOTE — TELEPHONE ENCOUNTER
Rx Refill Note  Requested Prescriptions     Pending Prescriptions Disp Refills    gabapentin (NEURONTIN) 400 MG capsule 90 capsule 0     Sig: Take 1 capsule by mouth 3 (Three) Times a Day.      Last office visit with prescribing clinician: 10/29/2024   Last telemedicine visit with prescribing clinician: 9/5/2024   Next office visit with prescribing clinician: 1/30/2025                         Would you like a call back once the refill request has been completed: [] Yes [] No    If the office needs to give you a call back, can they leave a voicemail: [] Yes [] No    Rachel Cruz MA  01/09/25, 10:28 EST

## 2025-01-27 DIAGNOSIS — I27.82 CHRONIC PULMONARY EMBOLISM, UNSPECIFIED PULMONARY EMBOLISM TYPE, UNSPECIFIED WHETHER ACUTE COR PULMONALE PRESENT: ICD-10-CM

## 2025-01-27 NOTE — TELEPHONE ENCOUNTER
Caller: Sheree Samuel    Relationship: Self    Best call back number: [phone]     Requested Prescriptions:   Requested Prescriptions     Pending Prescriptions Disp Refills    apixaban (Eliquis) 2.5 MG tablet tablet 180 tablet 1     Sig: Take 1 tablet by mouth.        Pharmacy where request should be sent: [facility]     Last office visit with prescribing clinician: 10/29/2024   Last telemedicine visit with prescribing clinician: 9/5/2024   Next office visit with prescribing clinician: 1/30/2025     Additional details provided by patient: PATIENT HAS 12 DAYS LEFT     Does the patient have less than a 3 day supply:  [] Yes  [x] No    Would you like a call back once the refill request has been completed: [x] Yes [] No    If the office needs to give you a call back, can they leave a voicemail: [x] Yes [] No    Rosey Parr Rep   01/27/25 12:37 EST

## 2025-01-29 ENCOUNTER — OFFICE VISIT (OUTPATIENT)
Dept: NEUROLOGY | Facility: CLINIC | Age: 70
End: 2025-01-29
Payer: COMMERCIAL

## 2025-01-29 ENCOUNTER — LAB (OUTPATIENT)
Dept: LAB | Facility: HOSPITAL | Age: 70
End: 2025-01-29
Payer: COMMERCIAL

## 2025-01-29 VITALS
SYSTOLIC BLOOD PRESSURE: 130 MMHG | OXYGEN SATURATION: 91 % | HEART RATE: 70 BPM | HEIGHT: 55 IN | DIASTOLIC BLOOD PRESSURE: 60 MMHG | WEIGHT: 116 LBS | BODY MASS INDEX: 26.85 KG/M2

## 2025-01-29 DIAGNOSIS — E53.8 VITAMIN B12 DEFICIENCY: ICD-10-CM

## 2025-01-29 DIAGNOSIS — Z87.898 HISTORY OF SEIZURES: ICD-10-CM

## 2025-01-29 DIAGNOSIS — Z74.09 IMPAIRED FUNCTIONAL MOBILITY, BALANCE, GAIT, AND ENDURANCE: ICD-10-CM

## 2025-01-29 DIAGNOSIS — F06.71 MILD NEUROCOGNITIVE DISORDER DUE TO MULTIPLE ETIOLOGIES, WITH BEHAVIORAL DISTURBANCE: Primary | ICD-10-CM

## 2025-01-29 PROBLEM — D68.61 ANTIPHOSPHOLIPID SYNDROME: Status: ACTIVE | Noted: 2025-01-29

## 2025-01-29 PROBLEM — D68.59: Status: ACTIVE | Noted: 2025-01-29

## 2025-01-29 PROBLEM — Z98.890 HISTORY OF STRABISMUS SURGERY: Status: ACTIVE | Noted: 2025-01-21

## 2025-01-29 PROBLEM — N32.81 OVERACTIVE BLADDER: Status: ACTIVE | Noted: 2025-01-29

## 2025-01-29 PROBLEM — H50.32 INTERMITTENT ESOTROPIA, ALTERNATING: Status: ACTIVE | Noted: 2025-01-21

## 2025-01-29 PROBLEM — R32 URINARY INCONTINENCE: Status: ACTIVE | Noted: 2025-01-29

## 2025-01-29 LAB — VIT B12 BLD-MCNC: 283 PG/ML (ref 211–946)

## 2025-01-29 PROCEDURE — 36415 COLL VENOUS BLD VENIPUNCTURE: CPT

## 2025-01-29 PROCEDURE — 83921 ORGANIC ACID SINGLE QUANT: CPT

## 2025-01-29 PROCEDURE — 82607 VITAMIN B-12: CPT

## 2025-01-29 PROCEDURE — 99214 OFFICE O/P EST MOD 30 MIN: CPT | Performed by: NURSE PRACTITIONER
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
## 2025-01-29 NOTE — LETTER
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 information, facts as evident, and the clinical opinion of the provider.

## 2025-01-30 ENCOUNTER — OFFICE VISIT (OUTPATIENT)
Dept: INTERNAL MEDICINE | Facility: CLINIC | Age: 70
End: 2025-01-30
Payer: COMMERCIAL

## 2025-01-30 ENCOUNTER — HOSPITAL ENCOUNTER (OUTPATIENT)
Dept: CT IMAGING | Facility: HOSPITAL | Age: 70
Discharge: HOME | End: 2025-01-30
Payer: COMMERCIAL

## 2025-01-30 VITALS
HEIGHT: 55 IN | HEART RATE: 72 BPM | DIASTOLIC BLOOD PRESSURE: 58 MMHG | TEMPERATURE: 98.2 F | SYSTOLIC BLOOD PRESSURE: 130 MMHG | BODY MASS INDEX: 27.54 KG/M2 | WEIGHT: 119 LBS
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
## 2025-01-30 DIAGNOSIS — E11.9 TYPE 2 DIABETES MELLITUS WITHOUT COMPLICATION, WITHOUT LONG-TERM CURRENT USE OF INSULIN: ICD-10-CM

## 2025-01-30 DIAGNOSIS — S09.90XA TRAUMATIC INJURY OF HEAD, INITIAL ENCOUNTER: ICD-10-CM
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
## 2025-01-30 LAB
EXPIRATION DATE: ABNORMAL
HBA1C MFR BLD: 6.3 % (ref 4.5–5.7)
Lab: ABNORMAL

## 2025-01-30 PROCEDURE — 70450 CT HEAD/BRAIN W/O DYE: CPT
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
## 2025-01-31 ENCOUNTER — TELEPHONE (OUTPATIENT)
Dept: INTERNAL MEDICINE | Facility: CLINIC | Age: 70
End: 2025-01-31
Payer: COMMERCIAL

## 2025-01-31 NOTE — TELEPHONE ENCOUNTER
Caller: Sheree Samuel    Relationship: Self    Best call back number: [phone]        What test was performed: CT SCAN     When was the test performed: 01/30/2025        Additional notes: THE PATIENT HAD THE SCAN DONE AT [facility] by [facility] YESTERDAY SHE WOULD LIKE TO GET THE RESULTS

## 2025-02-03 NOTE — TELEPHONE ENCOUNTER
Left voicemail for patient to return call.      MyChart message seen by patient Sheree Samuel on 1/31/2025  4:14 PM

## 2025-02-03 NOTE — TELEPHONE ENCOUNTER
Please call the patient and let her know that the CT of her head was normal and showed no signs of a bleed.  I sent her message in [employer] on Friday.

## 2025-02-04 LAB — METHYLMALONATE SERPL-SCNC: 561 NMOL/L (ref 0–378)

## 2025-02-06 DIAGNOSIS — E53.8 B12 DEFICIENCY: Primary | ICD-10-CM

## 2025-02-06 RX ORDER — CYANOCOBALAMIN 1000 UG/ML
1000 INJECTION, SOLUTION INTRAMUSCULAR; SUBCUTANEOUS WEEKLY
Status: SHIPPED | OUTPATIENT
Start: 2025-02-06 | End: 2025-03-06

## 2025-02-06 RX ORDER — CYANOCOBALAMIN 1000 UG/ML
1000 INJECTION, SOLUTION INTRAMUSCULAR; SUBCUTANEOUS
Status: SHIPPED | OUTPATIENT
Start: 2025-03-27 | End: 2025-06-19

## 2025-02-10 ENCOUNTER — TELEPHONE (OUTPATIENT)
Dept: INTERNAL MEDICINE | Facility: CLINIC | Age: 70
End: 2025-02-10
Payer: COMMERCIAL

## 2025-02-10 DIAGNOSIS — I10 ESSENTIAL (PRIMARY) HYPERTENSION: ICD-10-CM

## 2025-02-10 DIAGNOSIS — G62.9 NEUROPATHY: ICD-10-CM

## 2025-02-10 RX ORDER — GABAPENTIN 400 MG/1
400 CAPSULE ORAL 3 TIMES DAILY
Qty: 90 CAPSULE | Refills: 0 | Status: SHIPPED | OUTPATIENT
Start: 2025-02-10

## 2025-02-10 RX ORDER — AMLODIPINE BESYLATE 5 MG/1
5 TABLET ORAL DAILY
Qty: 90 TABLET | Refills: 1 | Status: SHIPPED | OUTPATIENT
Start: 2025-02-10

## 2025-02-10 RX ORDER — ROSUVASTATIN CALCIUM 20 MG/1
20 TABLET, COATED ORAL DAILY
Qty: 90 TABLET | Refills: 1 | Status: SHIPPED | OUTPATIENT
Start: 2025-02-10

## 2025-02-10 NOTE — TELEPHONE ENCOUNTER
Patient is requesting a return call regarding if she needs to receive b-12 injections or not. She states that at her last visit she was told that her b-12 levels were fine.

## 2025-02-10 NOTE — TELEPHONE ENCOUNTER
Lvm, advising pt we had been calling about CT results. Pt has seen that [employer] message on 1/31/2025  4:14 PM.

## 2025-02-10 NOTE — TELEPHONE ENCOUNTER
Rx Refill Note  Requested Prescriptions     Pending Prescriptions Disp Refills    metFORMIN (GLUCOPHAGE) 1000 MG tablet 180 tablet 1     Sig: Take 1 tablet by mouth 2 (Two) Times a Day With Meals.    gabapentin (NEURONTIN) 400 MG capsule 90 capsule 0     Sig: Take 1 capsule by mouth 3 (Three) Times a Day.    amLODIPine (NORVASC) 5 MG tablet 90 tablet 1     Sig: Take 1 tablet by mouth Daily.    rosuvastatin (CRESTOR) 20 MG tablet 90 tablet 1     Sig: Take 1 tablet by mouth Daily.      Last office visit with prescribing clinician: 1/30/2025   Last telemedicine visit with prescribing clinician: 9/5/2024   Next office visit with prescribing clinician: Visit date not found                         Would you like a call back once the refill request has been completed: [] Yes [] No    If the office needs to give you a call back, can they leave a voicemail: [] Yes [] No    Rachel Cruz MA  02/10/25, 13:24 EST

## 2025-02-10 NOTE — TELEPHONE ENCOUNTER
She does need to begin B12 injections.  A conversation was held between neurology and I related to another lab value which shows that she may not be absorbing the B12 appropriately.  She will need B12 injections weekly x 4 doses then monthly x 3.

## 2025-02-10 NOTE — TELEPHONE ENCOUNTER
Caller: Sheree Samuel    Relationship: Self    Best call back number: [phone]     Requested Prescriptions:   Requested Prescriptions     Pending Prescriptions Disp Refills    metFORMIN (GLUCOPHAGE) 1000 MG tablet 180 tablet 1     Sig: Take 1 tablet by mouth 2 (Two) Times a Day With Meals.    gabapentin (NEURONTIN) 400 MG capsule 90 capsule 0     Sig: Take 1 capsule by mouth 3 (Three) Times a Day.    amLODIPine (NORVASC) 5 MG tablet 90 tablet 1     Sig: Take 1 tablet by mouth Daily.    rosuvastatin (CRESTOR) 20 MG tablet 90 tablet 1     Sig: Take 1 tablet by mouth Daily.        Pharmacy where request should be sent: [facility]     Last office visit with prescribing clinician: 1/30/2025   Last telemedicine visit with prescribing clinician: 9/5/2024   Next office visit with prescribing clinician: Visit date not found     Additional details provided by patient: PATIENT NEEDS REFILLS OF GABAPENTIN AND MET FORMIN ADDED.    Does the patient have less than a 3 day supply:  [x] Yes  [] No    Would you like a call back once the refill request has been completed: [] Yes [] No    If the office needs to give you a call back, can they leave a voicemail: [] Yes [] No    Rosey Tyler Rep   02/10/25 13:21 EST

## 2025-02-11 ENCOUNTER — OFFICE VISIT (OUTPATIENT)
Dept: UROLOGY | Facility: CLINIC | Age: 70
End: 2025-02-11
Payer: COMMERCIAL

## 2025-02-11 VITALS — SYSTOLIC BLOOD PRESSURE: 130 MMHG | HEART RATE: 59 BPM | DIASTOLIC BLOOD PRESSURE: 77 MMHG | OXYGEN SATURATION: 99 %

## 2025-02-11 DIAGNOSIS — N39.41 URGE INCONTINENCE: Primary | ICD-10-CM
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
## 2025-02-14 ENCOUNTER — CLINICAL SUPPORT (OUTPATIENT)
Dept: INTERNAL MEDICINE | Facility: CLINIC | Age: 70
End: 2025-02-14
Payer: COMMERCIAL

## 2025-02-17 RX ORDER — TRAZODONE HYDROCHLORIDE 100 MG/1
200 TABLET ORAL NIGHTLY
Qty: 180 TABLET | Refills: 1 | Status: SHIPPED | OUTPATIENT
Start: 2025-02-17

## 2025-02-17 NOTE — TELEPHONE ENCOUNTER
Caller: Sheree Samuel    Relationship: Self    Best call back number: [phone]     Requested Prescriptions:   Requested Prescriptions     Pending Prescriptions Disp Refills    traZODone (DESYREL) 100 MG tablet 180 tablet 1     Sig: Take 2 tablets by mouth Every Night.        Pharmacy where request should be sent: [facility]     Last office visit with prescribing clinician: 1/30/2025   Last telemedicine visit with prescribing clinician: 9/5/2024   Next office visit with prescribing clinician: Visit date not found       Does the patient have less than a 3 day supply:  [x] Yes  [] No        Rosey Nathan Rep   02/17/25 13:05 EST

## 2025-02-19 ENCOUNTER — TELEPHONE (OUTPATIENT)
Dept: INTERNAL MEDICINE | Facility: CLINIC | Age: 70
End: 2025-02-19
Payer: COMMERCIAL

## 2025-02-19 ENCOUNTER — CLINICAL SUPPORT (OUTPATIENT)
Dept: INTERNAL MEDICINE | Facility: CLINIC | Age: 70
End: 2025-02-19
Payer: COMMERCIAL

## 2025-02-19 DIAGNOSIS — E53.8 B12 DEFICIENCY: Primary | ICD-10-CM

## 2025-02-19 PROCEDURE — 96372 THER/PROPH/DIAG INJ SC/IM: CPT

## 2025-02-19 RX ADMIN — CYANOCOBALAMIN 1000 MCG: 1000 INJECTION, SOLUTION INTRAMUSCULAR; SUBCUTANEOUS at 16:23

## 2025-02-19 NOTE — TELEPHONE ENCOUNTER
I called and advised her it was okay to be a day or two off. She will stop by today for her shot.

## 2025-02-19 NOTE — TELEPHONE ENCOUNTER
Caller: Sheree Samuel    Relationship: Self    Best call back number: [phone]     What was the call regarding: PATIENT WOULD LIKE TO KNOW IF SHE NEEDS TO GET THE B12 INJECTION ON THE SAME DAY EVERY WEEK OR CAN SHE SCHEDULE THEM ON DIFFERENT DAYS.

## 2025-02-24 DIAGNOSIS — I27.82 CHRONIC PULMONARY EMBOLISM, UNSPECIFIED PULMONARY EMBOLISM TYPE, UNSPECIFIED WHETHER ACUTE COR PULMONALE PRESENT: ICD-10-CM

## 2025-02-24 RX ORDER — PRAMIPEXOLE DIHYDROCHLORIDE 0.12 MG/1
0.12 TABLET ORAL 3 TIMES DAILY
Qty: 270 TABLET | Refills: 1 | Status: SHIPPED | OUTPATIENT
Start: 2025-02-24

## 2025-02-24 RX ORDER — BUSPIRONE HYDROCHLORIDE 7.5 MG/1
7.5 TABLET ORAL 3 TIMES DAILY
Qty: 270 TABLET | Refills: 1 | Status: SHIPPED | OUTPATIENT
Start: 2025-02-24

## 2025-02-24 RX ORDER — PIOGLITAZONE 30 MG/1
30 TABLET ORAL DAILY
Qty: 90 TABLET | Refills: 1 | Status: SHIPPED | OUTPATIENT
Start: 2025-02-24

## 2025-02-24 NOTE — TELEPHONE ENCOUNTER
Caller: Sheree Samuel    Relationship: Self    Best call back number: [phone]    Requested Prescriptions:   Requested Prescriptions     Pending Prescriptions Disp Refills    pioglitazone (ACTOS) 30 MG tablet 90 tablet 1     Sig: Take 1 tablet by mouth Daily.    busPIRone (BUSPAR) 7.5 MG tablet 270 tablet 1     Sig: Take 1 tablet by mouth 3 times a day.    pramipexole (MIRAPEX) 0.125 MG tablet 270 tablet 1     Sig: Take 1 tablet by mouth 3 times a day.    apixaban (Eliquis) 2.5 MG tablet tablet 180 tablet 1     Sig: Take 1 tablet by mouth Every 12 (Twelve) Hours.        Pharmacy where request should be sent: [facility]     Last office visit with prescribing clinician: 1/30/2025   Last telemedicine visit with prescribing clinician: 9/5/2024   Next office visit with prescribing clinician: Visit date not found     Additional details provided by patient: PATIENT HAS LESS THAN 3 DAYS ON PIOGLITAZONE    Does the patient have less than a 3 day supply:  [x] Yes  [] No    Would you like a call back once the refill request has been completed: [] Yes [x] No    If the office needs to give you a call back, can they leave a voicemail: [] Yes [x] No    Serenity Edwards, Rosey Rep   02/24/25 11:07 EST

## 2025-02-24 NOTE — TELEPHONE ENCOUNTER
Rx Refill Note  Requested Prescriptions     Pending Prescriptions Disp Refills    pioglitazone (ACTOS) 30 MG tablet 90 tablet 1     Sig: Take 1 tablet by mouth Daily.    busPIRone (BUSPAR) 7.5 MG tablet 270 tablet 1     Sig: Take 1 tablet by mouth 3 times a day.    pramipexole (MIRAPEX) 0.125 MG tablet 270 tablet 1     Sig: Take 1 tablet by mouth 3 times a day.    apixaban (Eliquis) 2.5 MG tablet tablet 180 tablet 1     Sig: Take 1 tablet by mouth Every 12 (Twelve) Hours.      Last office visit with prescribing clinician: 1/30/2025   Last telemedicine visit with prescribing clinician: 9/5/2024   Next office visit with prescribing clinician: Visit date not found                         Would you like a call back once the refill request has been completed: [] Yes [] No    If the office needs to give you a call back, can they leave a voicemail: [] Yes [] No    Marissa Bello MA  02/24/25, 11:11 EST

## 2025-02-27 ENCOUNTER — CLINICAL SUPPORT (OUTPATIENT)
Dept: INTERNAL MEDICINE | Facility: CLINIC | Age: 70
End: 2025-02-27
Payer: COMMERCIAL

## 2025-02-27 ENCOUNTER — OFFICE VISIT (OUTPATIENT)
Dept: CARDIOLOGY | Facility: CLINIC | Age: 70
End: 2025-02-27
Payer: COMMERCIAL

## 2025-02-27 VITALS
HEIGHT: 56 IN | DIASTOLIC BLOOD PRESSURE: 60 MMHG | WEIGHT: 123.2 LBS | SYSTOLIC BLOOD PRESSURE: 158 MMHG | BODY MASS INDEX: 27.71 KG/M2 | HEART RATE: 76 BPM

## 2025-02-27 DIAGNOSIS — I25.119 CORONARY ARTERY DISEASE INVOLVING NATIVE CORONARY ARTERY OF NATIVE HEART WITH ANGINA PECTORIS: Primary | ICD-10-CM

## 2025-02-27 DIAGNOSIS — I35.0 NONRHEUMATIC AORTIC VALVE STENOSIS: ICD-10-CM

## 2025-02-27 DIAGNOSIS — I10 ESSENTIAL (PRIMARY) HYPERTENSION: ICD-10-CM

## 2025-02-27 DIAGNOSIS — E78.5 HYPERLIPIDEMIA LDL GOAL <70: ICD-10-CM

## 2025-02-27 DIAGNOSIS — E53.8 VITAMIN B12 DEFICIENCY: Primary | ICD-10-CM

## 2025-02-27 PROCEDURE — 93000 ELECTROCARDIOGRAM COMPLETE: CPT | Performed by: INTERNAL MEDICINE

## 2025-02-27 PROCEDURE — 99213 OFFICE O/P EST LOW 20 MIN: CPT | Performed by: INTERNAL MEDICINE

## 2025-02-27 PROCEDURE — 96372 THER/PROPH/DIAG INJ SC/IM: CPT

## 2025-02-27 RX ADMIN — CYANOCOBALAMIN 1000 MCG: 1000 INJECTION, SOLUTION INTRAMUSCULAR; SUBCUTANEOUS at 14:40

## 2025-02-27 NOTE — PROGRESS NOTES
Follow-up Visit      Date: 2025  Patient Name: Sheree Samuel  : 1955   MRN: [id number]     Chief Complaint:    Chief Complaint   Patient presents with    Coronary Artery Disease     Coronary artery disease involving native coronary artery of native heart with angina pectoris         History of Present Illness: Sheree Samuel is a 69 y.o. female who is here today for follow-up on her coronary artery disease.  Patient has been doing fine cardiac wise.    Patient starting having more problem with her balance.  She has been falling a little more.  She has been using her walker but still not able to hold herself.  She also thinks that her neck is falling more and not stable.    She denies any paroxysmal nocturnal dyspnea.  She denies any dizziness.  She denies any passing out.  She denies any other symptoms.  She denies any snoring.  She denies any claudication.      Problem List     CARDIAC  Coronary Artery Disease:   Last cath 2018: Left main 10%, LAD with patent LIMA but no significant disease, circumflex normal RCA 50%, LIMA-LAD patent, SVG-PDA patent, SVG-OM patent     Myocardium:   Echo, 10/26/2023: LVEF 60%     Valvular:   MAC, AV calcification     Electrical:   NSR     Pericardium:   Normal     CARDIAC RISK FACTORS  Hypertension  Diabetes  Dyslipidemia  2023  TG 84 HDL 44     NON-CARDIAC  Anxiety  Arthritis  Asthma  COPD  DVT  Depression  Visual impairment    SURGERIES  Cholecystectomy  Hysterectomy  Joint replacement  Oophorectomy      Subjective      Review of Systems:   Review of Systems   Respiratory:  Positive for shortness of breath.    Cardiovascular: Negative.        Medications:     Current Outpatient Medications:     Accu-Chek Softclix Lancets lancets, Use to check blood sugar daily, Disp: 100 each, Rfl: 1    acetaminophen (TYLENOL) 500 MG tablet, Take 1 tablet by mouth Every 6 (Six) Hours As Needed for Mild Pain., Disp: , Rfl:     albuterol (PROVENTIL  HFA;VENTOLIN HFA) 108 (90 Base) MCG/ACT inhaler, Inhale 2 puffs Every 4 (Four) Hours As Needed for Wheezing. Indications: Chronic Obstructive Lung Disease, Disp: , Rfl:     amLODIPine (NORVASC) 5 MG tablet, Take 1 tablet by mouth Daily., Disp: 90 tablet, Rfl: 1    ammonium lactate (LAC-HYDRIN) 12 % lotion, Apply 1 Application topically to the appropriate area as directed 2 (Two) Times a Day., Disp: 225 g, Rfl: 5    apixaban (Eliquis) 2.5 MG tablet tablet, Take 1 tablet by mouth Every 12 (Twelve) Hours., Disp: 180 tablet, Rfl: 1    Blood Glucose Monitoring Suppl (Accu-Chek Guide Me) w/Device kit, Use to check blood sugar daily, Disp: 1 kit, Rfl: 0    busPIRone (BUSPAR) 7.5 MG tablet, Take 1 tablet by mouth 3 times a day., Disp: 270 tablet, Rfl: 1    Diclofenac Sodium (VOLTAREN) 1 % gel gel, Apply 2 grams topically to the appropriate area as directed 2 Times a Day As Needed for knee pain., Disp: 350 g, Rfl: 1    famotidine (PEPCID) 20 MG tablet, Take 1 tablet by mouth At Night As Needed., Disp: , Rfl:     Fluticasone-Salmeterol (ADVAIR/WIXELA) 250-50 MCG/ACT DISKUS, Inhale 1 puff Daily As Needed. Indications: Chronic Obstructive Lung Disease, Disp: , Rfl:     gabapentin (NEURONTIN) 400 MG capsule, Take 1 capsule by mouth 3 (Three) Times a Day., Disp: 90 capsule, Rfl: 0    glucose blood test strip, Use to check blood sugar once daily, Disp: 100 each, Rfl: 12    lidocaine (LIDODERM) 5 %, Place 1 patch on the skin as directed by provider Daily As Needed. Remove & Discard patch within 12 hours or as directed by MD, Disp: , Rfl:     metFORMIN (GLUCOPHAGE) 1000 MG tablet, Take 1 tablet by mouth 2 (Two) Times a Day With Meals., Disp: 180 tablet, Rfl: 1    Multiple Vitamins-Minerals (CENTRUM SILVER PO), Take 1 tablet by mouth Daily. Indications: nutritional supplement, Disp: , Rfl:     O2 (OXYGEN), Inhale 2 L/min Daily As Needed. Indications: oxygen support, Disp: , Rfl:     omeprazole (priLOSEC) 20 MG capsule, Take 1 capsule  by mouth Daily As Needed for GERD., Disp: 90 capsule, Rfl: 0    pain patient supplied pump, Continuous. Patient dose not know the setting, dose, or how much is delivered a day. Can give a bolus every 6 hours. Next refill 11.19.23. Dr. Marshall at [facility] is managing., Disp: , Rfl:     pioglitazone (ACTOS) 30 MG tablet, Take 1 tablet by mouth Daily., Disp: 90 tablet, Rfl: 1    pramipexole (MIRAPEX) 0.125 MG tablet, Take 1 tablet by mouth 3 times a day., Disp: 270 tablet, Rfl: 1    rosuvastatin (CRESTOR) 20 MG tablet, Take 1 tablet by mouth Daily., Disp: 90 tablet, Rfl: 1    sennosides-docusate (PERICOLACE) 8.6-50 MG per tablet, Take 1 tablet by mouth Daily As Needed. Indications: Constipation, Disp: , Rfl:     sertraline (ZOLOFT) 100 MG tablet, Take 2 tablets by mouth Daily., Disp: 180 tablet, Rfl: 1    tiZANidine (ZANAFLEX) 4 MG tablet, Take 1 tablet by mouth up to 3 Times a Day As Needed for muscle spasms, Disp: 90 tablet, Rfl: 1    traZODone (DESYREL) 100 MG tablet, Take 2 tablets by mouth Every Night., Disp: 180 tablet, Rfl: 1    Vibegron 75 MG tablet, Take 1 tablet by mouth Daily., Disp: 28 tablet, Rfl: 0    Current Facility-Administered Medications:     [START ON 3/27/2025] cyanocobalamin injection 1,000 mcg, 1,000 mcg, Intramuscular, Q28 Days, Delilah Becker APRN    cyanocobalamin injection 1,000 mcg, 1,000 mcg, Intramuscular, Weekly, Delilah Becker APRN, 1,000 mcg at 02/27/25 1440    Allergies:   Allergies   Allergen Reactions    Adhesive Tape Rash, Anaphylaxis and Other (See Comments)    Atorvastatin Other (See Comments)     Causes hair to fall out    Lipitor    Narcan [Naloxone Hcl] Other (See Comments)     Caused a heart attack    Neosporin [Neomycin-Bacitracin Zn-Polymyx] Other (See Comments)     Blisters    Pantoprazole GI Intolerance     Vomiting     Penicillins Anaphylaxis, Other (See Comments), Hives and Rash     Product containing penicillin (product)    Tape Unknown - High Severity     "Abilify [Aripiprazole] Rash     Unknown reaction    Actos [Pioglitazone] Nausea And Vomiting    Ceftin [Cefuroxime] Nausea And Vomiting    Cephalexin Other (See Comments)     Keflex    Naloxone Other (See Comments)     naloxone hydrochloride    Himgn-Lrnxa-Iifeyyu-Pramoxine Other (See Comments)     bacitracin / neomycin / polymyxin B / pramoxine    Bacitracin-Polymyxin B Hives and Rash     Note: note: pt. says allergic to base ingredients in triple antibiotics 1/10/10 cb    Cyclobenzaprine Rash and Hives     cyclobenzaprine hydrochloride    Duloxetine Hcl Unknown - Low Severity    Latex Unknown - Low Severity    Rofecoxib Rash and Other (See Comments)     Vioxx       Objective     Physical Exam:  Vitals:    02/27/25 1337   BP: 158/60   BP Location: Right arm   Patient Position: Sitting   Cuff Size: Adult   Pulse: 76   Weight: 55.9 kg (123 lb 3.2 oz)   Height: 142.2 cm (56\")     Body mass index is 27.62 kg/m².    Constitutional:       General: Not in acute distress.     Appearance: Healthy appearance. Not in distress.     Neck:     JVP:Not elevated     Carotid artery: Normal    Pulmonary:      Effort: Pulmonary effort is normal.      Breath sounds: Normal breath sounds. No wheezing. No rhonchi. No rales.     Cardiovascular:      Normal rate. Regular rhythm. Normal S1. Normal S2.      Murmurs: There is 2/6 systolic murmur.      No gallop. No click. No rub.     Abdominal:      General: Bowel sounds are normal.      Palpations: Abdomen is soft.      Tenderness: There is no abdominal tenderness.    Extremities:     Pulses:Normal radial and pedal pulses     Edema:no edema    Smoking Cessation:   Tobacco Product History : Patient never smoked    Lab Review:   Lab Results   Component Value Date    GLUCOSE 160 (H) 07/03/2024    BUN 17 07/03/2024    CREATININE 0.89 07/03/2024    EGFRIFNONA 44 (L) 07/12/2018    EGFRIFAFRI 77 08/22/2016    BCR 19.1 07/03/2024    K 3.9 07/03/2024    CO2 27.7 07/03/2024    CALCIUM 9.9 07/03/2024 "    ALBUMIN 3.8 04/03/2024    AST 12 04/03/2024    ALT 12 04/03/2024     Lab Results   Component Value Date    WBC 8.06 04/03/2024    HGB 10.9 (L) 04/03/2024    HCT 35.4 04/03/2024    MCV 87.8 04/03/2024     04/03/2024     Lab Results   Component Value Date    TSH 1.830 07/03/2024           ECG 12 Lead    Date/Time: 2/27/2025 1:59 PM  Performed by: Isaias Leija MD    Authorized by: Isaias Leija MD  Comparison: compared with previous ECG from 2/22/2024  Similar to previous ECG  Rhythm: sinus rhythm  Other findings: non-specific ST-T wave changes and left ventricular hypertrophy           Assessment / Plan      Assessment:   Diagnosis Plan   1. Coronary artery disease involving native coronary artery of native heart with angina pectoris        2. Essential (primary) hypertension        3. Hyperlipidemia LDL goal <70        4. Nonrheumatic aortic valve stenosis             Plan:  Patient has been stable on her coronary artery disease and we will continue with the current medications.  Patient blood pressure runs a little high in the clinic.  She checks it at home and it has been running good.  Patient cholesterol has not been checked and she will get a check on her next visit with her primary care physician.  Her aortic stenosis is mostly sclerosis.  Will continue with the current medications.  Her murmur has not changed.  We do not need to reevaluate the intensity of aortic murmur.      Follow Up:       Return in about 1 year (around 2/27/2026).    Isaias Leija MD

## 2025-02-28 ENCOUNTER — CLINICAL SUPPORT (OUTPATIENT)
Dept: UROLOGY | Facility: CLINIC | Age: 70
End: 2025-02-28
Payer: COMMERCIAL

## 2025-02-28 PROCEDURE — 99211 OFF/OP EST MAY X REQ PHY/QHP: CPT | Performed by: NURSE PRACTITIONER

## 2025-03-04 NOTE — TELEPHONE ENCOUNTER
Rx Refill Note  Requested Prescriptions     Pending Prescriptions Disp Refills    tiZANidine (ZANAFLEX) 4 MG tablet 90 tablet 1     Sig: Take 1 tablet by mouth up to 3 Times a Day As Needed for muscle spasms      Last office visit with prescribing clinician: 1/30/2025   Last telemedicine visit with prescribing clinician: 9/5/2024   Next office visit with prescribing clinician: Visit date not found                         Would you like a call back once the refill request has been completed: [] Yes [] No    If the office needs to give you a call back, can they leave a voicemail: [] Yes [] No    Marissa Bello MA  03/04/25, 16:00 EST

## 2025-03-04 NOTE — TELEPHONE ENCOUNTER
Caller: Jimmie Sheree Exeter    Relationship: Self    Best call back number: [phone]     Requested Prescriptions:   Requested Prescriptions     Pending Prescriptions Disp Refills    tiZANidine (ZANAFLEX) 4 MG tablet 90 tablet 1     Sig: Take 1 tablet by mouth up to 3 Times a Day As Needed for muscle spasms        Pharmacy where request should be sent: [facility]     Last office visit with prescribing clinician: 1/30/2025   Last telemedicine visit with prescribing clinician: 9/5/2024   Next office visit with prescribing clinician: Visit date not found     Additional details provided by patient:     Does the patient have less than a 3 day supply:  [] Yes  [x] No    Would you like a call back once the refill request has been completed: [] Yes [x] No    If the office needs to give you a call back, can they leave a voicemail: [] Yes [x] No    Cadance Dunaway, RegSched Rep   03/04/25 15:57 EST

## 2025-03-10 ENCOUNTER — CLINICAL SUPPORT (OUTPATIENT)
Dept: INTERNAL MEDICINE | Facility: CLINIC | Age: 70
End: 2025-03-10
Payer: COMMERCIAL

## 2025-03-10 ENCOUNTER — TELEPHONE (OUTPATIENT)
Dept: INTERNAL MEDICINE | Facility: CLINIC | Age: 70
End: 2025-03-10

## 2025-03-10 DIAGNOSIS — E53.8 VITAMIN B12 DEFICIENCY: Primary | ICD-10-CM

## 2025-03-10 DIAGNOSIS — E53.8 B12 DEFICIENCY: Primary | ICD-10-CM

## 2025-03-10 PROCEDURE — 96372 THER/PROPH/DIAG INJ SC/IM: CPT

## 2025-03-10 RX ORDER — CYANOCOBALAMIN 1000 UG/ML
1000 INJECTION, SOLUTION INTRAMUSCULAR; SUBCUTANEOUS
Qty: 1 ML | Refills: 3 | Status: SHIPPED | OUTPATIENT
Start: 2025-03-10

## 2025-03-10 RX ORDER — CYANOCOBALAMIN 1000 UG/ML
1000 INJECTION, SOLUTION INTRAMUSCULAR; SUBCUTANEOUS ONCE
Status: COMPLETED | OUTPATIENT
Start: 2025-03-10 | End: 2025-03-10

## 2025-03-10 RX ORDER — CYANOCOBALAMIN 1000 UG/ML
1000 INJECTION, SOLUTION INTRAMUSCULAR; SUBCUTANEOUS
Status: DISCONTINUED | OUTPATIENT
Start: 2025-04-10 | End: 2025-03-10

## 2025-03-10 RX ORDER — SYRINGE W-NEEDLE,DISPOSAB,3 ML 25GX5/8"
SYRINGE, EMPTY DISPOSABLE MISCELLANEOUS
Qty: 3 EACH | Refills: 0 | Status: SHIPPED | OUTPATIENT
Start: 2025-03-10

## 2025-03-10 RX ADMIN — CYANOCOBALAMIN 1000 MCG: 1000 INJECTION, SOLUTION INTRAMUSCULAR; SUBCUTANEOUS at 11:32

## 2025-03-10 NOTE — TELEPHONE ENCOUNTER
Patient is going to [place] for 2 weeks. She wants to start receiving the shots at home for the b12 and is confident in giving them.

## 2025-03-12 DIAGNOSIS — N39.41 URGE INCONTINENCE: ICD-10-CM

## 2025-03-12 DIAGNOSIS — G62.9 NEUROPATHY: ICD-10-CM

## 2025-03-12 NOTE — TELEPHONE ENCOUNTER
Caller: Sheree Samuel    Relationship: Self    Best call back number: [phone]    Requested Prescriptions:   Requested Prescriptions     Pending Prescriptions Disp Refills    Vibegron 75 MG tablet 28 tablet 0     Sig: Take 1 tablet by mouth Daily.    gabapentin (NEURONTIN) 400 MG capsule 90 capsule 0     Sig: Take 1 capsule by mouth 3 (Three) Times a Day.        Pharmacy where request should be sent: [facility]     Last office visit with prescribing clinician: 1/30/2025   Last telemedicine visit with prescribing clinician: Visit date not found   Next office visit with prescribing clinician: Visit date not found     Additional details provided by patient: PATIENT IS COMPLETELY OUT OF VIBEGRON 75MG     Does the patient have less than a 3 day supply:  [x] Yes  [] No    Would you like a call back once the refill request has been completed: [] Yes [x] No    If the office needs to give you a call back, can they leave a voicemail: [] Yes [x] No    Rosey Holly Rep   03/12/25 11:32 EDT

## 2025-03-13 RX ORDER — GABAPENTIN 400 MG/1
400 CAPSULE ORAL 3 TIMES DAILY
Qty: 90 CAPSULE | Refills: 0 | Status: SHIPPED | OUTPATIENT
Start: 2025-03-13

## 2025-03-17 ENCOUNTER — TELEPHONE (OUTPATIENT)
Dept: INTERNAL MEDICINE | Facility: CLINIC | Age: 70
End: 2025-03-17
Payer: COMMERCIAL

## 2025-03-17 DIAGNOSIS — N39.41 URGE INCONTINENCE: ICD-10-CM

## 2025-03-17 NOTE — TELEPHONE ENCOUNTER
Caller: Sheree Samuel    Relationship: Self    Best call back number: [phone]      Which medication are you concerned about: Vibegron 75 MG tablet     Who prescribed you this medication: GUILLERMO MARROQUIN     When did you start taking this medication: ONE MONTH     What are your concerns: THE PATIENT STATED SHE REQUESTED THIS MEDICATION TO BE SENT TO [facility] IN [place] ON 03/13/2025, BUT REPORTS SHE HAS NOT RECEIVED ANYTHING. (E-PRESCRIBING STATUS DOES NOT SHOW IT TRANSMITTED TO THE PHARMACY).    THE PATIENT REPORTS SHE IS OUT OF MEDICATION.     How long have you had these concerns: SINCE 03/13/2025    PLEASE CALL THE PATIENT AND ADVISE.

## 2025-03-18 ENCOUNTER — TELEPHONE (OUTPATIENT)
Dept: INTERNAL MEDICINE | Facility: CLINIC | Age: 70
End: 2025-03-18
Payer: COMMERCIAL

## 2025-03-18 NOTE — TELEPHONE ENCOUNTER
Advised patient via my chart that I have received a prior authorization request from the pharmacy for the medication GEMTESA 75 AMG tablet. I submitted the request to the insurance company and once I have an answer I will let her know

## 2025-03-19 ENCOUNTER — TELEPHONE (OUTPATIENT)
Dept: INTERNAL MEDICINE | Facility: CLINIC | Age: 70
End: 2025-03-19
Payer: COMMERCIAL

## 2025-03-19 NOTE — TELEPHONE ENCOUNTER
Advised patient via my chart that  I called and spoke with her  insurance company regarding the prior approval for the medication GEMTESA 75 mg tablet . As per Laurie the prior auth rep, this medication is not covered under her plan and I will not be able to initiate a prior authorization . A list of alterative drugs that are covered under her plan will be faxed over to us . Also advised the patient that I will reach out to the physician to see what other drugs can be prescribed for her and I will let her know.

## 2025-03-21 ENCOUNTER — TELEPHONE (OUTPATIENT)
Dept: INTERNAL MEDICINE | Facility: CLINIC | Age: 70
End: 2025-03-21
Payer: COMMERCIAL

## 2025-03-21 DIAGNOSIS — L03.116 CELLULITIS OF LEFT LOWER EXTREMITY: Primary | ICD-10-CM

## 2025-03-21 NOTE — TELEPHONE ENCOUNTER
Caller: Sheree Samuel    Relationship: Self    Best call back number: [phone]    What medication are you requesting: CELLULITIS MED     What are your current symptoms: RED AND SWOLLEN      How long have you been experiencing symptoms: A COUPLE OF WEEKS    Have you had these symptoms before:    [] Yes  [x] No    Have you been treated for these symptoms before:   [] Yes  [x] No    If a prescription is needed, what is your preferred pharmacy and phone number: [facility]     Additional notes: PATIENT HAD AN APPOINTMENT WITH DR. BRODERICK AT [facility] FOR A KNEE REPLACMENT AND SAID SHE HAD CELLULITIS ON THE LOWER PART OF HER LEGS (KNEE DOWN) AND HE RECOMMENDS ANTI BIOTICS. PATIENTS RIGHT LEG HAS TONED DOWN BUT LEFT IS STILL BAD BUT NEITHER IS WEEPING

## 2025-03-24 RX ORDER — DOXYCYCLINE 100 MG/1
100 CAPSULE ORAL 2 TIMES DAILY
Qty: 14 CAPSULE | Refills: 0 | Status: SHIPPED | OUTPATIENT
Start: 2025-03-24 | End: 2025-03-31

## 2025-03-24 NOTE — TELEPHONE ENCOUNTER
Please call Ms. Samuel and let her know that an antibiotic has been sent to her pharmacy. While taking it, avoid taking any vitamins.

## 2025-03-24 NOTE — TELEPHONE ENCOUNTER
Name: Sheree Samuel    Relationship: Self    Best Callback Number:     [phone]     HUB PROVIDED THE RELAY MESSAGE FROM THE OFFICE   PATIENT VOICED UNDERSTANDING AND HAS NO FURTHER QUESTIONS AT THIS TIME    ADDITIONAL INFORMATION:

## 2025-03-24 NOTE — TELEPHONE ENCOUNTER
Lvm, asking for call back.    HUB TO RELAY:  An antibiotic has been sent to the pharmacy. While taking it, avoid taking any vitamins.

## 2025-03-28 DIAGNOSIS — F33.2 MAJOR DEPRESSIVE DISORDER, RECURRENT SEVERE WITHOUT PSYCHOTIC FEATURES: ICD-10-CM

## 2025-03-28 RX ORDER — SERTRALINE HYDROCHLORIDE 100 MG/1
200 TABLET, FILM COATED ORAL DAILY
Qty: 180 TABLET | Refills: 1 | Status: SHIPPED | OUTPATIENT
Start: 2025-03-28

## 2025-03-28 NOTE — TELEPHONE ENCOUNTER
Caller: Sheree Samuel    Relationship: Self    Best call back number: [phone]     Requested Prescriptions: METOPROLOL 25 MG   Requested Prescriptions     Pending Prescriptions Disp Refills    sertraline (ZOLOFT) 100 MG tablet 180 tablet 1     Sig: Take 2 tablets by mouth Daily.        Pharmacy where request should be sent: [facility]     Last office visit with prescribing clinician: 1/30/2025   Last telemedicine visit with prescribing clinician: Visit date not found   Next office visit with prescribing clinician: Visit date not found     Additional details provided by patient: PATIENT IS OUT OF THE METOPROLOL     Does the patient have less than a 3 day supply:  [x] Yes  [] No    Would you like a call back once the refill request has been completed: [x] Yes [] No    If the office needs to give you a call back, can they leave a voicemail: [x] Yes [] No    Rosey Parr Rep   03/28/25 15:35 EDT

## 2025-03-28 NOTE — TELEPHONE ENCOUNTER
I called to speak with patient since metoprolol is not on the med list. She stated you advised her to take it as needed. She has metoprolol 25mg take BID PRN. She is requesting a refill.

## 2025-03-31 RX ORDER — METOPROLOL TARTRATE 25 MG/1
25 TABLET, FILM COATED ORAL 2 TIMES DAILY
Qty: 180 TABLET | Refills: 1 | Status: SHIPPED | OUTPATIENT
Start: 2025-03-31

## 2025-03-31 RX ORDER — METOPROLOL TARTRATE 25 MG/1
25 TABLET, FILM COATED ORAL 2 TIMES DAILY
Qty: 180 TABLET | Refills: 1 | Status: SHIPPED | OUTPATIENT
Start: 2025-03-31 | End: 2025-03-31 | Stop reason: SDUPTHER

## 2025-03-31 NOTE — TELEPHONE ENCOUNTER
Patient verbalized understanding. She has an appointment on Friday 4/11/25 at 12:45.    She said she needs a refill on the Metoprolol sent to [facility] [place].  Pended below

## 2025-03-31 NOTE — TELEPHONE ENCOUNTER
I don't recall recommending this medication as needed. It lowers your heart rate and blood pressure. If she is having elevations in her heart rate and BP, I would advise her to take it consistently and record x 1 week, then make an appt to discuss.

## 2025-04-11 ENCOUNTER — OFFICE VISIT (OUTPATIENT)
Dept: INTERNAL MEDICINE | Facility: CLINIC | Age: 70
End: 2025-04-11
Payer: COMMERCIAL

## 2025-04-11 VITALS
SYSTOLIC BLOOD PRESSURE: 96 MMHG | DIASTOLIC BLOOD PRESSURE: 48 MMHG | BODY MASS INDEX: 26.68 KG/M2 | WEIGHT: 118.6 LBS | TEMPERATURE: 98.2 F | HEIGHT: 56 IN | HEART RATE: 60 BPM

## 2025-04-11 DIAGNOSIS — I10 ESSENTIAL (PRIMARY) HYPERTENSION: Primary | ICD-10-CM

## 2025-04-11 DIAGNOSIS — E53.8 VITAMIN B12 DEFICIENCY: ICD-10-CM

## 2025-04-11 RX ORDER — CYANOCOBALAMIN 1000 UG/ML
1000 INJECTION, SOLUTION INTRAMUSCULAR; SUBCUTANEOUS
Qty: 1 ML | Refills: 3 | Status: SHIPPED | OUTPATIENT
Start: 2025-04-11

## 2025-04-11 RX ORDER — CYANOCOBALAMIN 1000 UG/ML
1000 INJECTION, SOLUTION INTRAMUSCULAR; SUBCUTANEOUS ONCE
Status: COMPLETED | OUTPATIENT
Start: 2025-04-11 | End: 2025-04-11

## 2025-04-11 RX ADMIN — CYANOCOBALAMIN 1000 MCG: 1000 INJECTION, SOLUTION INTRAMUSCULAR; SUBCUTANEOUS at 13:31
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
## 2025-04-13 DIAGNOSIS — G62.9 NEUROPATHY: ICD-10-CM

## 2025-04-14 RX ORDER — METOPROLOL TARTRATE 25 MG/1
25 TABLET, FILM COATED ORAL 2 TIMES DAILY
Qty: 180 TABLET | Refills: 1 | Status: SHIPPED | OUTPATIENT
Start: 2025-04-14

## 2025-04-14 RX ORDER — GABAPENTIN 400 MG/1
400 CAPSULE ORAL 3 TIMES DAILY
Qty: 90 CAPSULE | Refills: 0 | Status: SHIPPED | OUTPATIENT
Start: 2025-04-14

## 2025-04-14 NOTE — TELEPHONE ENCOUNTER
Rx Refill Note  Requested Prescriptions     Pending Prescriptions Disp Refills    gabapentin (NEURONTIN) 400 MG capsule 90 capsule 0     Sig: Take 1 capsule by mouth 3 (Three) Times a Day.      Last office visit with prescribing clinician: 4/11/2025   Next office visit with prescribing clinician: 4/13/2025     LA: 03/13/25 #90 0R                          Would you like a call back once the refill request has been completed: [] Yes [] No    If the office needs to give you a call back, can they leave a voicemail: [] Yes [] No    Alejandra Mendoza LPN  04/14/25, 09:05 EDT

## 2025-05-05 ENCOUNTER — OFFICE VISIT (OUTPATIENT)
Dept: INTERNAL MEDICINE | Facility: CLINIC | Age: 70
End: 2025-05-05
Payer: COMMERCIAL

## 2025-05-05 ENCOUNTER — TELEPHONE (OUTPATIENT)
Dept: INTERNAL MEDICINE | Facility: CLINIC | Age: 70
End: 2025-05-05

## 2025-05-05 VITALS
DIASTOLIC BLOOD PRESSURE: 70 MMHG | BODY MASS INDEX: 25.87 KG/M2 | HEART RATE: 60 BPM | SYSTOLIC BLOOD PRESSURE: 164 MMHG | TEMPERATURE: 97.7 F | WEIGHT: 115 LBS | HEIGHT: 56 IN

## 2025-05-05 DIAGNOSIS — I10 ESSENTIAL (PRIMARY) HYPERTENSION: ICD-10-CM

## 2025-05-05 DIAGNOSIS — R07.81 RIB PAIN: ICD-10-CM
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
## 2025-05-12 DIAGNOSIS — K21.9 GASTROESOPHAGEAL REFLUX DISEASE WITHOUT ESOPHAGITIS: ICD-10-CM

## 2025-05-12 RX ORDER — OMEPRAZOLE 20 MG/1
20 CAPSULE, DELAYED RELEASE ORAL DAILY PRN
Qty: 90 CAPSULE | Refills: 0 | Status: SHIPPED | OUTPATIENT
Start: 2025-05-12

## 2025-05-13 ENCOUNTER — OFFICE VISIT (OUTPATIENT)
Dept: UROLOGY | Facility: CLINIC | Age: 70
End: 2025-05-13
Payer: COMMERCIAL

## 2025-05-13 VITALS
HEART RATE: 71 BPM | OXYGEN SATURATION: 96 % | DIASTOLIC BLOOD PRESSURE: 53 MMHG | WEIGHT: 115 LBS | BODY MASS INDEX: 25.87 KG/M2 | SYSTOLIC BLOOD PRESSURE: 131 MMHG | HEIGHT: 56 IN

## 2025-05-13 DIAGNOSIS — N39.41 URGE INCONTINENCE: Primary | ICD-10-CM

## 2025-05-13 PROCEDURE — 99214 OFFICE O/P EST MOD 30 MIN: CPT | Performed by: NURSE PRACTITIONER
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
## 2025-05-19 ENCOUNTER — TELEPHONE (OUTPATIENT)
Dept: INTERNAL MEDICINE | Facility: CLINIC | Age: 70
End: 2025-05-19
Payer: COMMERCIAL

## 2025-05-19 DIAGNOSIS — E53.8 VITAMIN B12 DEFICIENCY: ICD-10-CM

## 2025-05-19 RX ORDER — CYANOCOBALAMIN 1000 UG/ML
1000 INJECTION, SOLUTION INTRAMUSCULAR; SUBCUTANEOUS
Qty: 1 ML | Refills: 3 | Status: SHIPPED | OUTPATIENT
Start: 2025-05-19

## 2025-05-19 NOTE — TELEPHONE ENCOUNTER
Caller: Sheree Samuel    Relationship: Self    Best call back number: [phone]     Requested Prescriptions:   B12 INJECTION    Pharmacy where request should be sent: [facility]     Last office visit with prescribing clinician: 5/5/2025   Last telemedicine visit with prescribing clinician: Visit date not found   Next office visit with prescribing clinician: 8/5/2025     Additional details provided by patient:     Does the patient have less than a 3 day supply:  [] Yes  [x] No    Would you like a call back once the refill request has been completed: [] Yes [x] No    If the office needs to give you a call back, can they leave a voicemail: [] Yes [x] No    Cadance Dunaway, RegSched Rep   05/19/25 10:52 EDT

## 2025-05-27 DIAGNOSIS — G62.9 NEUROPATHY: ICD-10-CM

## 2025-05-27 RX ORDER — GABAPENTIN 400 MG/1
400 CAPSULE ORAL 3 TIMES DAILY
Qty: 90 CAPSULE | Refills: 0 | Status: SHIPPED | OUTPATIENT
Start: 2025-05-27

## 2025-05-27 NOTE — TELEPHONE ENCOUNTER
Caller: Jimmie Sheree [place]voir    Relationship: Self    Best call back number:   Telephone Information:   Mobile [phone]     Requested Prescriptions:   Requested Prescriptions     Pending Prescriptions Disp Refills    gabapentin (NEURONTIN) 400 MG capsule 90 capsule 0     Sig: Take 1 capsule by mouth 3 (Three) Times a Day.    metFORMIN (GLUCOPHAGE) 1000 MG tablet 180 tablet 1     Sig: Take 1 tablet by mouth 2 (Two) Times a Day With Meals.        Pharmacy where request should be sent: [facility] [place] [facility]     Last office visit with prescribing clinician: 5/5/2025   Last telemedicine visit with prescribing clinician: Visit date not found   Next office visit with prescribing clinician: 8/5/2025     Additional details provided by patient:     Does the patient have less than a 3 day supply:  [x] Yes  [] No    Would you like a call back once the refill request has been completed: [] Yes [x] No    If the office needs to give you a call back, can they leave a voicemail: [] Yes [x] No    Rosey Griffin Rep   05/27/25 11:25 EDT

## 2025-05-30 ENCOUNTER — TELEPHONE (OUTPATIENT)
Dept: INTERNAL MEDICINE | Facility: CLINIC | Age: 70
End: 2025-05-30
Payer: COMMERCIAL

## 2025-05-30 DIAGNOSIS — R07.81 RIB PAIN: Primary | ICD-10-CM
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
## 2025-05-30 NOTE — TELEPHONE ENCOUNTER
Please let Sheree know that we can restart physical therapy for generalized strength and mobility.  We can also send her to pain management if this continues.
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
## 2025-05-30 NOTE — TELEPHONE ENCOUNTER
Spoke to pt.    She sees [facility] in [place] all ready.    She would like a PT referral to [facility] in [facility].
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
## 2025-06-10 DIAGNOSIS — I27.82 CHRONIC PULMONARY EMBOLISM, UNSPECIFIED PULMONARY EMBOLISM TYPE, UNSPECIFIED WHETHER ACUTE COR PULMONALE PRESENT: ICD-10-CM

## 2025-06-10 NOTE — TELEPHONE ENCOUNTER
Caller: Jimmie Sheree [place]    Relationship: Self    Best call back number:      Requested Prescriptions:   Requested Prescriptions     Pending Prescriptions Disp Refills    apixaban (Eliquis) 2.5 MG tablet tablet 180 tablet 1     Sig: Take 1 tablet by mouth Every 12 (Twelve) Hours.    tiZANidine (ZANAFLEX) 4 MG tablet 90 tablet 1     Sig: Take 1 tablet by mouth up to 3 Times a Day As Needed for muscle spasms        Pharmacy where request should be sent: [facility]     Last office visit with prescribing clinician: 5/5/2025   Last telemedicine visit with prescribing clinician: Visit date not found   Next office visit with prescribing clinician: 8/5/2025     Additional details provided by patient: PATIENT HAS MORE THAN 3 DAYS      Does the patient have less than a 3 day supply:  [] Yes  [x] No      Rosey Farrar   06/10/25 11:07 EDT

## 2025-06-10 NOTE — TELEPHONE ENCOUNTER
Rx Refill Note  Requested Prescriptions     Pending Prescriptions Disp Refills    apixaban (Eliquis) 2.5 MG tablet tablet 180 tablet 1     Sig: Take 1 tablet by mouth Every 12 (Twelve) Hours.    tiZANidine (ZANAFLEX) 4 MG tablet 90 tablet 1     Sig: Take 1 tablet by mouth up to 3 Times a Day As Needed for muscle spasms      Last office visit with prescribing clinician: 5/5/2025   Last telemedicine visit with prescribing clinician: Visit date not found   Next office visit with prescribing clinician: 8/5/2025                         Would you like a call back once the refill request has been completed: [] Yes [] No    If the office needs to give you a call back, can they leave a voicemail: [] Yes [] No    Marissa Bello MA  06/10/25, 11:12 EDT

## 2025-06-12 ENCOUNTER — OFFICE VISIT (OUTPATIENT)
Dept: INTERNAL MEDICINE | Facility: CLINIC | Age: 70
End: 2025-06-12
Payer: COMMERCIAL

## 2025-06-12 VITALS
HEIGHT: 56 IN | TEMPERATURE: 98 F | BODY MASS INDEX: 25.72 KG/M2 | SYSTOLIC BLOOD PRESSURE: 142 MMHG | HEART RATE: 68 BPM | DIASTOLIC BLOOD PRESSURE: 64 MMHG

## 2025-06-12 DIAGNOSIS — G89.29 CHRONIC LOW BACK PAIN, UNSPECIFIED BACK PAIN LATERALITY, UNSPECIFIED WHETHER SCIATICA PRESENT: Primary | ICD-10-CM

## 2025-06-12 DIAGNOSIS — I10 ESSENTIAL (PRIMARY) HYPERTENSION: ICD-10-CM

## 2025-06-12 DIAGNOSIS — M54.50 CHRONIC LOW BACK PAIN, UNSPECIFIED BACK PAIN LATERALITY, UNSPECIFIED WHETHER SCIATICA PRESENT: Primary | ICD-10-CM

## 2025-06-12 RX ORDER — HYDROCODONE BITARTRATE AND ACETAMINOPHEN 5; 325 MG/1; MG/1
1 TABLET ORAL EVERY 12 HOURS SCHEDULED
COMMUNITY
Start: 2025-05-22

## 2025-06-12 RX ORDER — AMLODIPINE BESYLATE 10 MG/1
10 TABLET ORAL DAILY
Qty: 90 TABLET | Refills: 1 | Status: SHIPPED | OUTPATIENT
Start: 2025-06-12

## 2025-06-12 NOTE — LETTER
[facility]  Vaccine Consent Form    Patient Name:  Sheree Samuel  Patient :  1955  MRN: [id number]     Vaccine(s) Ordered    Shingrix Vaccine        Screening Checklist  The following questions should be completed prior to vaccination. If you answer “yes” to any question, it does not necessarily mean you should not be vaccinated. It just means we may need to clarify or ask more questions. If a question is unclear, please ask your healthcare provider to explain it.    Yes No   Any fever or moderate to severe illness today (mild illness and/or antibiotic treatment are not contraindications)?     Do you have a history of a serious reaction to any previous vaccinations, such as anaphylaxis, encephalopathy within 7 days, Guillain-Westland syndrome within 6 weeks, seizure?     Have you received any live vaccine(s) (e.g MMR, CHRISTOPHER) or any other vaccines in the last month (to ensure duplicate doses aren't given)?     Do you have an anaphylactic allergy to latex (DTaP, DTaP-IPV, Hep A, Hep B, MenB, RV, Td, Tdap), baker’s yeast (Hep B, HPV), polysorbates (RSV, nirsevimab, PCV 20 and 21, Rotavirrus, Tdap, Shingrix), or gelatin (CHRISTOPHER, MMR)?     Do you have an anaphylactic allergy to neomycin (Rabies, CHRISTOPHER, MMR, IPV, Hep A), polymyxin B (IPV), or streptomycin (IPV)?      Any cancer, leukemia, AIDS, or other immune system disorder? (CHRISTOPHER, MMR, RV)     Do you have a parent, brother, or sister with an immune system problem (if immune competence of vaccine recipient clinically verified, can proceed)? (MMR, CHRISTOPHER)     Any recent steroid treatments for >2 weeks, chemotherapy, or radiation treatment? (CHRISTOPHER, MMR)     Have you received antibody-containing blood transfusions or IVIG in the past 11 months (recommended interval is dependent on product)? (MMR, CHRISTOPHER)     Have you taken antiviral drugs (acyclovir, famciclovir, valacyclovir for CHRISTOPHER) in the last 24 or 48 hours, respectively?      Are you pregnant or planning to become  "pregnant within 1 month? (CHRISTOPHER, MMR, HPV, IPV, MenB, Abrexvy; For Hep B- refer to Engerix-B; For RSV - Abrysvo is indicated for 32-36 weeks of pregnancy from September to January)     For infants, have you ever been told your child has had intussusception or a medical emergency involving obstruction of the intestine (Rotavirus)? If not for an infant, can skip this question.         *Ordering Physicians/APC should be consulted if \"yes\" is checked by the patient or guardian above.  I have received, read, and understand the Vaccine Information Statement (VIS) for each vaccine ordered.  I have considered my or my child's health status as well as the health status of my close contacts.  I have taken the opportunity to discuss my vaccine questions with my or my child's health care provider.   I have requested that the ordered vaccine(s) be given to me or my child.  I understand the benefits and risks of the vaccines.  I understand that I should remain in the clinic for 15 minutes after receiving the vaccine(s).  _________________________________________________________  Signature of Patient or Parent/Legal Guardian ____________________  Date     "
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
## 2025-06-23 ENCOUNTER — TELEPHONE (OUTPATIENT)
Dept: INTERNAL MEDICINE | Facility: CLINIC | Age: 70
End: 2025-06-23
Payer: COMMERCIAL

## 2025-06-23 ENCOUNTER — TRANSCRIBE ORDERS (OUTPATIENT)
Dept: ADMINISTRATIVE | Facility: HOSPITAL | Age: 70
End: 2025-06-23
Payer: COMMERCIAL

## 2025-06-23 DIAGNOSIS — E53.8 VITAMIN B12 DEFICIENCY: ICD-10-CM

## 2025-06-23 DIAGNOSIS — M54.16 RADICULOPATHY, LUMBAR REGION: Primary | ICD-10-CM

## 2025-06-23 DIAGNOSIS — M54.6 PAIN IN THORACIC SPINE: ICD-10-CM

## 2025-06-23 RX ORDER — SYRINGE W-NEEDLE,DISPOSAB,3 ML 25GX5/8"
SYRINGE, EMPTY DISPOSABLE MISCELLANEOUS
Qty: 3 EACH | Refills: 0 | Status: SHIPPED | OUTPATIENT
Start: 2025-06-23

## 2025-06-23 RX ORDER — CYANOCOBALAMIN 1000 UG/ML
1000 INJECTION, SOLUTION INTRAMUSCULAR; SUBCUTANEOUS
Qty: 1 ML | Refills: 3 | Status: SHIPPED | OUTPATIENT
Start: 2025-06-23

## 2025-06-23 NOTE — TELEPHONE ENCOUNTER
I don't see where we ordered any CT on the patient but it looks like a Kimberly Ling PA-C ordered a CT lumbar and thoracic today.       B12 shot pended.

## 2025-06-23 NOTE — TELEPHONE ENCOUNTER
Caller: Sheree Samuel    Relationship: Self    Best call back number:      [phone] (Mobile)     Who are you requesting to speak with (clinical staff, provider,  specific staff member):    NIKKI, CHEYNE        What was the call regarding:  PATIENT HAS NOT RECEIVED ANY WORD ON THE CT SCAN AND SHE HAS FALLEN AGAIN TWO WEEKS AGO FOR THE SECOND TIME AND LANDED STRAIGHT ON HER BACK  SHE NEEDS TO HAVE THIS CHECKED     ALSO PATIENT NEEDS TO HAVE HER B 12 SHOT ORDERED AND SENT TO HER HOUSE     PLEASE CALL

## 2025-06-27 DIAGNOSIS — G62.9 NEUROPATHY: ICD-10-CM

## 2025-06-27 RX ORDER — METOPROLOL TARTRATE 25 MG/1
25 TABLET, FILM COATED ORAL 2 TIMES DAILY
Qty: 180 TABLET | Refills: 1 | Status: SHIPPED | OUTPATIENT
Start: 2025-06-27

## 2025-06-27 RX ORDER — GABAPENTIN 400 MG/1
400 CAPSULE ORAL 3 TIMES DAILY
Qty: 90 CAPSULE | Refills: 0 | Status: SHIPPED | OUTPATIENT
Start: 2025-06-27

## 2025-06-27 NOTE — TELEPHONE ENCOUNTER
Caller: Sheree Samuel    Relationship: Self    Best call back number: [phone]     Requested Prescriptions:   Requested Prescriptions     Pending Prescriptions Disp Refills    gabapentin (NEURONTIN) 400 MG capsule 90 capsule 0     Sig: Take 1 capsule by mouth 3 (Three) Times a Day.    metoprolol tartrate (LOPRESSOR) 25 MG tablet 180 tablet 1     Sig: Take 1 tablet by mouth 2 (Two) Times a Day.        Pharmacy where request should be sent: [facility]     Last office visit with prescribing clinician: 6/12/2025   Last telemedicine visit with prescribing clinician: Visit date not found   Next office visit with prescribing clinician: 8/5/2025     Additional details provided by patient: PATIENT HAS 3 DAYS LEFT.     Does the patient have less than a 3 day supply:  [x] Yes  [x] No    Would you like a call back once the refill request has been completed: [] Yes [x] No    If the office needs to give you a call back, can they leave a voicemail: [] Yes [x] No    Rosey Bhandari Rep   06/27/25 15:13 EDT

## 2025-07-11 ENCOUNTER — HOSPITAL ENCOUNTER (OUTPATIENT)
Dept: CT IMAGING | Facility: HOSPITAL | Age: 70
Discharge: HOME | End: 2025-07-11
Payer: COMMERCIAL

## 2025-07-11 DIAGNOSIS — M54.6 PAIN IN THORACIC SPINE: ICD-10-CM

## 2025-07-11 DIAGNOSIS — M54.16 RADICULOPATHY, LUMBAR REGION: ICD-10-CM

## 2025-07-11 PROCEDURE — 72128 CT CHEST SPINE W/O DYE: CPT

## 2025-07-11 PROCEDURE — 72131 CT LUMBAR SPINE W/O DYE: CPT

## 2025-07-16 ENCOUNTER — TELEPHONE (OUTPATIENT)
Dept: INTERNAL MEDICINE | Facility: CLINIC | Age: 70
End: 2025-07-16
Payer: COMMERCIAL

## 2025-07-16 NOTE — TELEPHONE ENCOUNTER
Caller: Alessio Samuel    Relationship: Emergency Contact    Best call back number: [phone]       What test was performed: CT SCAN    When was the test performed: 07/11/25    Where was the test performed: [facility]    Additional notes:  WOULD LIKE A CALL BACK TO DISCUSS THE RESULTS OF THIS TEST WHEN THEY ARE AVAILABLE.

## 2025-07-18 ENCOUNTER — OFFICE VISIT (OUTPATIENT)
Dept: INTERNAL MEDICINE | Facility: CLINIC | Age: 70
End: 2025-07-18
Payer: COMMERCIAL

## 2025-07-18 VITALS
OXYGEN SATURATION: 90 % | HEART RATE: 62 BPM | BODY MASS INDEX: 24.38 KG/M2 | DIASTOLIC BLOOD PRESSURE: 50 MMHG | TEMPERATURE: 98.4 F | WEIGHT: 109 LBS | SYSTOLIC BLOOD PRESSURE: 110 MMHG

## 2025-07-18 DIAGNOSIS — S22.089G CLOSED FRACTURE OF ELEVENTH THORACIC VERTEBRA WITH DELAYED HEALING, UNSPECIFIED FRACTURE MORPHOLOGY, SUBSEQUENT ENCOUNTER: ICD-10-CM

## 2025-07-18 DIAGNOSIS — L03.116 CELLULITIS OF LEFT LOWER EXTREMITY: Primary | ICD-10-CM

## 2025-07-18 PROCEDURE — 99214 OFFICE O/P EST MOD 30 MIN: CPT

## 2025-07-18 RX ORDER — VIBEGRON 75 MG/1
1 TABLET, FILM COATED ORAL DAILY
COMMUNITY

## 2025-07-18 RX ORDER — DOXYCYCLINE 100 MG/1
100 CAPSULE ORAL 2 TIMES DAILY
Qty: 20 CAPSULE | Refills: 0 | Status: SHIPPED | OUTPATIENT
Start: 2025-07-18 | End: 2025-08-03

## 2025-07-18 NOTE — PROGRESS NOTES
Acute Office Visit      Date: 2025   Patient Name: Sheree Samuel  : 1955   MRN: [id number]     Chief Complaint:    Chief Complaint   Patient presents with    Cellultiis       History of Present Illness: Sheree Samuel is a 70 y.o. female.      History of Present Illness  The patient is a 70-year-old female who presents today with concerns of cellulitis. She is accompanied by her .    She has been experiencing symptoms of cellulitis in her left leg for the past 2 weeks, which she has been attempting to manage independently without success. Initially, she noticed redness and swelling in her leg, which has since worsened and begun to drain fluid. She reports pain in her leg, rating it as an 8 on a scale of 1 to 10. She does not report any fever or chills but mentions feeling cold. Her last course of antibiotics for cellulitis was approximately 3 months ago. She has a history of cellulitis in both legs, with the current episode being more severe in the left leg. A previous instance of cellulitis led to the postponement of an eye surgery due to the bacterial nature of the infection.      Subjective      I have reviewed the patients family history, social history, past medical history, past surgical history and have updated it as appropriate.     Medications:     Current Outpatient Medications:     Accu-Chek Softclix Lancets lancets, Use to check blood sugar daily, Disp: 100 each, Rfl: 1    acetaminophen (TYLENOL) 500 MG tablet, Take 1 tablet by mouth Every 6 (Six) Hours As Needed for Mild Pain., Disp: , Rfl:     albuterol (PROVENTIL HFA;VENTOLIN HFA) 108 (90 Base) MCG/ACT inhaler, Inhale 2 puffs Every 4 (Four) Hours As Needed for Wheezing. Indications: Chronic Obstructive Lung Disease, Disp: , Rfl:     amLODIPine (NORVASC) 10 MG tablet, Take 1 tablet by mouth Daily., Disp: 90 tablet, Rfl: 1    ammonium lactate (LAC-HYDRIN) 12 % lotion, Apply 1 Application topically to the appropriate  area as directed 2 (Two) Times a Day. (Patient taking differently: Apply 1 Application topically to the appropriate area as directed 2 (Two) Times a Day. When she thinks about it), Disp: 225 g, Rfl: 5    apixaban (Eliquis) 2.5 MG tablet tablet, Take 1 tablet by mouth Every 12 (Twelve) Hours., Disp: 180 tablet, Rfl: 1    Blood Glucose Monitoring Suppl (Accu-Chek Guide Me) w/Device kit, Use to check blood sugar daily, Disp: 1 kit, Rfl: 0    busPIRone (BUSPAR) 7.5 MG tablet, Take 1 tablet by mouth 3 times a day., Disp: 270 tablet, Rfl: 1    cyanocobalamin 1000 MCG/ML injection, Inject 1 mL into the appropriate muscle as directed by prescriber Every 28 (Twenty-Eight) Days., Disp: 1 mL, Rfl: 3    Diclofenac Sodium (VOLTAREN) 1 % gel gel, Apply 2 grams topically to the appropriate area as directed 2 Times a Day As Needed for knee pain., Disp: 350 g, Rfl: 1    famotidine (PEPCID) 20 MG tablet, Take 1 tablet by mouth At Night As Needed., Disp: , Rfl:     Fluticasone-Salmeterol (ADVAIR/WIXELA) 250-50 MCG/ACT DISKUS, Inhale 1 puff Daily As Needed. Indications: Chronic Obstructive Lung Disease, Disp: , Rfl:     gabapentin (NEURONTIN) 400 MG capsule, Take 1 capsule by mouth 3 (Three) Times a Day., Disp: 90 capsule, Rfl: 0    glucose blood test strip, Use to check blood sugar once daily, Disp: 100 each, Rfl: 12    HYDROcodone-acetaminophen (NORCO) 5-325 MG per tablet, Take 1 tablet by mouth Every 12 (Twelve) Hours., Disp: , Rfl:     lidocaine (LIDODERM) 5 %, Place 1 patch on the skin as directed by provider Daily As Needed. Remove & Discard patch within 12 hours or as directed by MD, Disp: , Rfl:     metFORMIN (GLUCOPHAGE) 1000 MG tablet, Take 1 tablet by mouth 2 (Two) Times a Day With Meals., Disp: 180 tablet, Rfl: 1    metoprolol tartrate (LOPRESSOR) 25 MG tablet, Take 1 tablet by mouth 2 (Two) Times a Day., Disp: 180 tablet, Rfl: 1    Multiple Vitamins-Minerals (CENTRUM SILVER PO), Take 1 tablet by mouth Daily. Indications:  "nutritional supplement, Disp: , Rfl:     O2 (OXYGEN), Inhale 2 L/min Daily As Needed. Indications: oxygen support, Disp: , Rfl:     omeprazole (priLOSEC) 20 MG capsule, Take 1 capsule by mouth Daily As Needed for GERD., Disp: 90 capsule, Rfl: 0    pain patient supplied pump, Continuous. Patient dose not know the setting, dose, or how much is delivered a day. Can give a bolus every 6 hours. Next refill 11.19.23. Dr. Marshall at [facility] is managing., Disp: , Rfl:     pioglitazone (ACTOS) 30 MG tablet, Take 1 tablet by mouth Daily., Disp: 90 tablet, Rfl: 1    pramipexole (MIRAPEX) 0.125 MG tablet, Take 1 tablet by mouth 3 times a day., Disp: 270 tablet, Rfl: 1    rosuvastatin (CRESTOR) 20 MG tablet, Take 1 tablet by mouth Daily., Disp: 90 tablet, Rfl: 1    sennosides-docusate (PERICOLACE) 8.6-50 MG per tablet, Take 1 tablet by mouth Daily As Needed. Indications: Constipation, Disp: , Rfl:     sertraline (ZOLOFT) 100 MG tablet, Take 2 tablets by mouth Daily., Disp: 180 tablet, Rfl: 1    Syringe 25G X 1\" 3 ML misc, Use to inject B12 every 4 weeks, Disp: 3 each, Rfl: 0    tiZANidine (ZANAFLEX) 4 MG tablet, Take 1 tablet by mouth up to 3 Times a Day As Needed for muscle spasms, Disp: 90 tablet, Rfl: 1    traZODone (DESYREL) 100 MG tablet, Take 2 tablets by mouth Every Night., Disp: 180 tablet, Rfl: 1    Vibegron (Gemtesa) 75 MG tablet, Take 1 tablet by mouth Daily., Disp: , Rfl:     doxycycline (VIBRAMYCIN) 100 MG capsule, Take 1 capsule by mouth 2 (Two) Times a Day for 10 days., Disp: 20 capsule, Rfl: 0    Vibegron 75 MG tablet, Take 1 tablet by mouth Daily. (Patient not taking: Reported on 7/18/2025), Disp: 30 tablet, Rfl: 11    Allergies:   Allergies   Allergen Reactions    Adhesive Tape Rash, Anaphylaxis and Other (See Comments)    Atorvastatin Other (See Comments)     Causes hair to fall out    Lipitor    Narcan [Naloxone Hcl] Other (See Comments)     Caused a heart attack    Neosporin [Neomycin-Bacitracin " Zn-Polymyx] Other (See Comments)     Blisters    Pantoprazole GI Intolerance     Vomiting     Penicillins Anaphylaxis, Other (See Comments), Hives and Rash     Product containing penicillin (product)    Tape Unknown - High Severity    Abilify [Aripiprazole] Rash     Unknown reaction    Actos [Pioglitazone] Nausea And Vomiting    Ceftin [Cefuroxime] Nausea And Vomiting    Cephalexin Other (See Comments)     Keflex    Naloxone Other (See Comments)     naloxone hydrochloride    Pqhnq-Yxggm-Rnjcmyx-Pramoxine Other (See Comments)     bacitracin / neomycin / polymyxin B / pramoxine    Bacitracin-Polymyxin B Hives and Rash     Note: note: pt. says allergic to base ingredients in triple antibiotics 1/10/10 cb    Cyclobenzaprine Rash and Hives     cyclobenzaprine hydrochloride    Duloxetine Hcl Unknown - Low Severity    Latex Unknown - Low Severity    Rofecoxib Rash and Other (See Comments)     Vioxx       Objective     Physical Exam: Please see above  Vital Signs:   Vitals:    07/18/25 1553   BP: 110/50   Pulse: 62   Temp: 98.4 °F (36.9 °C)   TempSrc: Infrared   SpO2: 90%   Weight: 49.4 kg (109 lb)     Body mass index is 24.38 kg/m².    Physical Exam  Musculoskeletal:      Left lower leg: Swelling and tenderness present. Edema present.   Skin:     Findings: Erythema present.      Comments: Erythema noted on the left lower extremity         Procedures      Assessment / Plan      Assessment/Plan:   Problem List Items Addressed This Visit    None  Visit Diagnoses         Cellulitis of left lower extremity    -  Primary    Relevant Medications    doxycycline (VIBRAMYCIN) 100 MG capsule      Closed fracture of eleventh thoracic vertebra with delayed healing, unspecified fracture morphology, subsequent encounter        Relevant Orders    Ambulatory Referral to Neurosurgery              Assessment & Plan  1. Cellulitis.  - Symptoms have persisted for approximately 2 weeks, including redness, swelling, and fluid drainage from the  left leg.  - Pain is rated at 8/10.  - History of cellulitis in both legs, with the last antibiotic treatment occurring 3 months ago.  - Doxycycline prescribed    2.  Vertebral fracture  -Patient had a CT scan done which confirmed an acute fracture at T10, 11 and L3.  I have placed a referral to neurosurgery for the management of this fracture.    Follow Up:   Return in about 10 days (around 7/28/2025) for Recheck.    Patient or patient representative verbalized consent for the use of Ambient Listening during the visit with  GUILLERMO Thornton for chart documentation. 7/21/2025  08:14 EDT    GUILLERMO Thornton  [facility] [address]  [facility]  [address]  [address] [place] [id number]  Fax [phone]  Phone [phone]

## 2025-07-21 NOTE — TELEPHONE ENCOUNTER
Caller: Alessio Samuel    Relationship: Emergency Contact    Best call back number: [phone]     What was the call regarding: PATIENTS  RETURNED CALL AND WOULD LIKE A CALL BACK    Is it okay if the provider responds through MyChart:

## 2025-07-21 NOTE — TELEPHONE ENCOUNTER
Please call and let them know that there are areas indicating possibly old fractures but also concerns for new fractures. She will need to see neurosurgery ([facility]). The referral has been sent. If, when you call them, they want to talk to me, transfer them to me please.

## 2025-07-24 ENCOUNTER — TELEPHONE (OUTPATIENT)
Dept: NEUROSURGERY | Facility: CLINIC | Age: 70
End: 2025-07-24
Payer: COMMERCIAL

## 2025-07-24 ENCOUNTER — TELEPHONE (OUTPATIENT)
Dept: INTERNAL MEDICINE | Facility: CLINIC | Age: 70
End: 2025-07-24
Payer: COMMERCIAL

## 2025-07-24 NOTE — TELEPHONE ENCOUNTER
Pt's spouse stated when pt was seen 7/18/25, rx for doxycycline was to be sent to [facility] pharmacy in [place], then shipped to the home. Pt still has not received her medication.    I advised the medication was sent to [facility] local pharmacy on [address]. Spouse was not happy about this as he was told it was going to be mailed from  Pharmacy in [place]. I apologized for the miscommunication.    Spouse wants to know if the pt needs to keep 7/28/25 appointment since she has not starting taking the medication yet.

## 2025-07-25 NOTE — TELEPHONE ENCOUNTER
PT'S APPT FOR 07/28/25 HAS BEEN CANCELLED. PT HAS AN UPCOMING APPT ON 08/04/2025 FOR A FOLLOW UP.

## 2025-07-29 DIAGNOSIS — G62.9 NEUROPATHY: ICD-10-CM

## 2025-07-29 RX ORDER — GABAPENTIN 400 MG/1
400 CAPSULE ORAL 3 TIMES DAILY
Qty: 90 CAPSULE | Refills: 0 | Status: SHIPPED | OUTPATIENT
Start: 2025-07-29

## 2025-07-29 NOTE — TELEPHONE ENCOUNTER
Caller: Sheree Samuel    Relationship: Self    Best call back number: [phone]     Requested Prescriptions:   Requested Prescriptions     Pending Prescriptions Disp Refills    gabapentin (NEURONTIN) 400 MG capsule 90 capsule 0     Sig: Take 1 capsule by mouth 3 (Three) Times a Day.        Pharmacy where request should be sent: [facility]     Last office visit with prescribing clinician: 6/12/2025   Last telemedicine visit with prescribing clinician: Visit date not found   Next office visit with prescribing clinician: 8/4/2025     Additional details provided by patient: PATIENT HAS CALLED REQUESTING A REFILL ON ABOVE MEDICATION.     Does the patient have less than a 3 day supply:  [x] Yes  [] No    Would you like a call back once the refill request has been completed: [] Yes [x] No    If the office needs to give you a call back, can they leave a voicemail: [] Yes [x] No    Monica Palmer   07/29/25 11:43 EDT

## 2025-07-29 NOTE — TELEPHONE ENCOUNTER
Rx Refill Note  Requested Prescriptions     Pending Prescriptions Disp Refills    gabapentin (NEURONTIN) 400 MG capsule 90 capsule 0     Sig: Take 1 capsule by mouth 3 (Three) Times a Day.      Last office visit with prescribing clinician: 7/18/25   Next office visit with prescribing clinician: 8/4/2025     LA: 06/27/25 #90 0R                          Would you like a call back once the refill request has been completed: [] Yes [] No    If the office needs to give you a call back, can they leave a voicemail: [] Yes [] No    Alejandra Mendoza LPN  07/29/25, 11:45 EDT

## 2025-08-04 ENCOUNTER — OFFICE VISIT (OUTPATIENT)
Dept: INTERNAL MEDICINE | Facility: CLINIC | Age: 70
End: 2025-08-04
Payer: COMMERCIAL

## 2025-08-04 VITALS
HEIGHT: 56 IN | TEMPERATURE: 96.8 F | SYSTOLIC BLOOD PRESSURE: 146 MMHG | WEIGHT: 100 LBS | HEART RATE: 60 BPM | BODY MASS INDEX: 22.5 KG/M2 | DIASTOLIC BLOOD PRESSURE: 68 MMHG

## 2025-08-04 DIAGNOSIS — R29.6 FREQUENT FALLS: ICD-10-CM

## 2025-08-04 DIAGNOSIS — I10 ESSENTIAL (PRIMARY) HYPERTENSION: ICD-10-CM

## 2025-08-04 DIAGNOSIS — R26.89 DECREASED MOBILITY: ICD-10-CM

## 2025-08-04 DIAGNOSIS — E11.9 TYPE 2 DIABETES MELLITUS WITHOUT COMPLICATION, WITHOUT LONG-TERM CURRENT USE OF INSULIN: Primary | ICD-10-CM

## 2025-08-04 DIAGNOSIS — M54.50 CHRONIC LOW BACK PAIN, UNSPECIFIED BACK PAIN LATERALITY, UNSPECIFIED WHETHER SCIATICA PRESENT: ICD-10-CM

## 2025-08-04 DIAGNOSIS — G89.29 CHRONIC LOW BACK PAIN, UNSPECIFIED BACK PAIN LATERALITY, UNSPECIFIED WHETHER SCIATICA PRESENT: ICD-10-CM

## 2025-08-04 LAB
EXPIRATION DATE: ABNORMAL
HBA1C MFR BLD: 6.3 % (ref 4.5–5.7)
Lab: ABNORMAL

## 2025-08-04 PROCEDURE — 83036 HEMOGLOBIN GLYCOSYLATED A1C: CPT

## 2025-08-04 PROCEDURE — 99214 OFFICE O/P EST MOD 30 MIN: CPT

## 2025-08-11 ENCOUNTER — TELEPHONE (OUTPATIENT)
Dept: INTERNAL MEDICINE | Facility: CLINIC | Age: 70
End: 2025-08-11
Payer: COMMERCIAL

## 2025-08-13 ENCOUNTER — OFFICE VISIT (OUTPATIENT)
Dept: NEUROSURGERY | Facility: CLINIC | Age: 70
End: 2025-08-13
Payer: COMMERCIAL

## 2025-08-13 VITALS — BODY MASS INDEX: 24.07 KG/M2 | TEMPERATURE: 97.3 F | HEIGHT: 56 IN | WEIGHT: 107 LBS

## 2025-08-13 DIAGNOSIS — S22.000D COMPRESSION FRACTURE OF THORACIC VERTEBRA WITH ROUTINE HEALING, UNSPECIFIED THORACIC VERTEBRAL LEVEL, SUBSEQUENT ENCOUNTER: Primary | ICD-10-CM

## 2025-08-13 DIAGNOSIS — S32.000A COMPRESSION FRACTURE OF LUMBAR VERTEBRA, UNSPECIFIED LUMBAR VERTEBRAL LEVEL, INITIAL ENCOUNTER: ICD-10-CM

## 2025-08-14 RX ORDER — TRAZODONE HYDROCHLORIDE 100 MG/1
200 TABLET ORAL NIGHTLY
Qty: 180 TABLET | Refills: 1 | Status: SHIPPED | OUTPATIENT
Start: 2025-08-14

## 2025-08-18 ENCOUNTER — HOSPITAL ENCOUNTER (OUTPATIENT)
Dept: NUCLEAR MEDICINE | Facility: HOSPITAL | Age: 70
Discharge: HOME | End: 2025-08-18
Payer: COMMERCIAL

## 2025-08-18 PROCEDURE — 34310000005 TECHNETIUM MEDRONATE KIT

## 2025-08-18 PROCEDURE — A9503 TC99M MEDRONATE: HCPCS

## 2025-08-18 PROCEDURE — 78300 BONE IMAGING LIMITED AREA: CPT

## 2025-08-18 RX ORDER — TC 99M MEDRONATE 20 MG/10ML
20.7 INJECTION, POWDER, LYOPHILIZED, FOR SOLUTION INTRAVENOUS
Status: COMPLETED | OUTPATIENT
Start: 2025-08-18 | End: 2025-08-18

## 2025-08-18 RX ADMIN — TC 99M MEDRONATE 20.7 MILLICURIE: 20 INJECTION, POWDER, LYOPHILIZED, FOR SOLUTION INTRAVENOUS at 11:28

## 2025-08-20 ENCOUNTER — TELEMEDICINE (OUTPATIENT)
Dept: NEUROSURGERY | Facility: CLINIC | Age: 70
End: 2025-08-20
Payer: COMMERCIAL

## 2025-08-20 DIAGNOSIS — S32.030G COMPRESSION FRACTURE OF L3 LUMBAR VERTEBRA, WITH DELAYED HEALING, SUBSEQUENT ENCOUNTER: ICD-10-CM

## 2025-08-20 DIAGNOSIS — M80.88XG OSTEOPOROTIC COMPRESSION FRACTURE OF VERTEBRA WITH DELAYED HEALING, SUBSEQUENT ENCOUNTER: Primary | ICD-10-CM

## 2025-08-20 DIAGNOSIS — S22.070G COMPRESSION FRACTURE OF T10 VERTEBRA WITH DELAYED HEALING, SUBSEQUENT ENCOUNTER: ICD-10-CM

## 2025-08-20 DIAGNOSIS — S32.000A COMPRESSION FRACTURE OF LUMBAR VERTEBRA, UNSPECIFIED LUMBAR VERTEBRAL LEVEL, INITIAL ENCOUNTER: ICD-10-CM

## 2025-08-20 DIAGNOSIS — S22.080G COMPRESSION FRACTURE OF T11 VERTEBRA WITH DELAYED HEALING, SUBSEQUENT ENCOUNTER: ICD-10-CM

## 2025-08-20 DIAGNOSIS — S22.000D COMPRESSION FRACTURE OF THORACIC VERTEBRA WITH ROUTINE HEALING, UNSPECIFIED THORACIC VERTEBRAL LEVEL, SUBSEQUENT ENCOUNTER: ICD-10-CM

## 2025-08-20 PROBLEM — S22.000G COMPRESSION FRACTURE OF THORACIC VERTEBRA WITH DELAYED HEALING: Status: ACTIVE | Noted: 2025-08-20

## 2025-08-22 ENCOUNTER — TELEPHONE (OUTPATIENT)
Dept: INTERNAL MEDICINE | Facility: CLINIC | Age: 70
End: 2025-08-22
Payer: COMMERCIAL

## 2025-08-24 DIAGNOSIS — G62.9 NEUROPATHY: ICD-10-CM

## 2025-08-25 RX ORDER — GABAPENTIN 400 MG/1
400 CAPSULE ORAL 3 TIMES DAILY
Qty: 90 CAPSULE | Refills: 0 | Status: SHIPPED | OUTPATIENT
Start: 2025-08-25

## 2025-08-25 RX ORDER — ROSUVASTATIN CALCIUM 20 MG/1
20 TABLET, COATED ORAL DAILY
Qty: 90 TABLET | Refills: 1 | Status: SHIPPED | OUTPATIENT
Start: 2025-08-25

## 2025-08-25 RX ORDER — PIOGLITAZONE 30 MG/1
30 TABLET ORAL DAILY
Qty: 90 TABLET | Refills: 1 | Status: SHIPPED | OUTPATIENT
Start: 2025-08-25

## 2025-08-26 PROBLEM — M80.88XA OSTEOPOROTIC COMPRESSION FRACTURE OF SPINE: Status: ACTIVE | Noted: 2025-08-20

## 2025-08-27 ENCOUNTER — OFFICE VISIT (OUTPATIENT)
Dept: INTERNAL MEDICINE | Facility: CLINIC | Age: 70
End: 2025-08-27
Payer: COMMERCIAL

## 2025-08-27 ENCOUNTER — TELEPHONE (OUTPATIENT)
Dept: INTERNAL MEDICINE | Facility: CLINIC | Age: 70
End: 2025-08-27
Payer: COMMERCIAL

## 2025-08-27 VITALS
BODY MASS INDEX: 25.28 KG/M2 | DIASTOLIC BLOOD PRESSURE: 64 MMHG | WEIGHT: 113 LBS | TEMPERATURE: 98.6 F | SYSTOLIC BLOOD PRESSURE: 138 MMHG | HEART RATE: 64 BPM

## 2025-08-27 DIAGNOSIS — L03.116 CELLULITIS OF LEFT LOWER EXTREMITY: Primary | ICD-10-CM

## 2025-08-27 DIAGNOSIS — R11.0 NAUSEA: ICD-10-CM

## 2025-08-27 PROCEDURE — 87147 CULTURE TYPE IMMUNOLOGIC: CPT | Performed by: NURSE PRACTITIONER

## 2025-08-27 PROCEDURE — 87070 CULTURE OTHR SPECIMN AEROBIC: CPT | Performed by: NURSE PRACTITIONER

## 2025-08-27 PROCEDURE — 87186 SC STD MICRODIL/AGAR DIL: CPT | Performed by: NURSE PRACTITIONER

## 2025-08-27 PROCEDURE — 99214 OFFICE O/P EST MOD 30 MIN: CPT | Performed by: NURSE PRACTITIONER

## 2025-08-27 PROCEDURE — 87205 SMEAR GRAM STAIN: CPT | Performed by: NURSE PRACTITIONER

## 2025-08-27 RX ORDER — ONDANSETRON 4 MG/1
4 TABLET, ORALLY DISINTEGRATING ORAL ONCE
Status: SHIPPED | OUTPATIENT
Start: 2025-08-27

## 2025-08-27 RX ORDER — SULFAMETHOXAZOLE AND TRIMETHOPRIM 800; 160 MG/1; MG/1
1 TABLET ORAL 2 TIMES DAILY
Qty: 14 TABLET | Refills: 0 | Status: SHIPPED | OUTPATIENT
Start: 2025-08-27 | End: 2025-08-29

## 2025-08-29 ENCOUNTER — OFFICE VISIT (OUTPATIENT)
Dept: NEUROLOGY | Facility: CLINIC | Age: 70
End: 2025-08-29
Payer: COMMERCIAL

## 2025-08-29 VITALS
OXYGEN SATURATION: 90 % | DIASTOLIC BLOOD PRESSURE: 60 MMHG | BODY MASS INDEX: 25.42 KG/M2 | WEIGHT: 113 LBS | SYSTOLIC BLOOD PRESSURE: 114 MMHG | HEIGHT: 56 IN | HEART RATE: 67 BPM

## 2025-08-29 DIAGNOSIS — Z87.898 HISTORY OF SEIZURES: ICD-10-CM

## 2025-08-29 DIAGNOSIS — Z74.09 IMPAIRED FUNCTIONAL MOBILITY, BALANCE, GAIT, AND ENDURANCE: ICD-10-CM

## 2025-08-29 DIAGNOSIS — F06.71 MILD NEUROCOGNITIVE DISORDER DUE TO MULTIPLE ETIOLOGIES, WITH BEHAVIORAL DISTURBANCE: Primary | ICD-10-CM

## 2025-08-31 LAB
BACTERIA SPEC AEROBE CULT: ABNORMAL
BACTERIA SPEC AEROBE CULT: ABNORMAL
GRAM STN SPEC: ABNORMAL
GRAM STN SPEC: ABNORMAL

## (undated) DEVICE — DEV COMP RAD PRELUDESYNC 24CM

## (undated) DEVICE — PK CATH CARD 10

## (undated) DEVICE — CATH DIAG EXPO .045 FL3  5F 100CM

## (undated) DEVICE — MODEL BT2000 P/N 700287-012KIT CONTENTS: MANIFOLD WITH SALINE AND CONTRAST PORTS, SALINE TUBING WITH SPIKE AND HAND SYRINGE, TRANSDUCER: Brand: BT2000 AUTOMATED MANIFOLD KIT

## (undated) DEVICE — GW INQWIRE FC PTFE STD J/1.5 .035 260

## (undated) DEVICE — INTRO SHEATH PRELUDE IDEAL SPRNG COIL 021 6F 23X80CM

## (undated) DEVICE — MODEL AT P65, P/N 701554-001KIT CONTENTS: HAND CONTROLLER, 3-WAY HIGH-PRESSURE STOPCOCK WITH ROTATING END AND PREMIUM HIGH-PRESSURE TUBING: Brand: ANGIOTOUCH® KIT

## (undated) DEVICE — SWAN-GANZ TRUE SIZE THERMODULTION CATHETER, 5F: Brand: SWAN-GANZ TRUE SIZE

## (undated) DEVICE — ANGIOGRAPHIC CATHETER: Brand: EXPO™